# Patient Record
Sex: FEMALE | Employment: FULL TIME | URBAN - METROPOLITAN AREA
[De-identification: names, ages, dates, MRNs, and addresses within clinical notes are randomized per-mention and may not be internally consistent; named-entity substitution may affect disease eponyms.]

---

## 2017-06-19 ENCOUNTER — HOSPITAL ENCOUNTER (OUTPATIENT)
Dept: LAB | Age: 37
Discharge: HOME | End: 2017-06-19
Payer: OTHER GOVERNMENT

## 2017-06-19 ENCOUNTER — OFFICE VISIT (OUTPATIENT)
Dept: HEMATOLOGY | Age: 37
End: 2017-06-19

## 2017-06-19 VITALS
HEART RATE: 86 BPM | HEIGHT: 66 IN | BODY MASS INDEX: 24.01 KG/M2 | WEIGHT: 149.4 LBS | OXYGEN SATURATION: 99 % | DIASTOLIC BLOOD PRESSURE: 77 MMHG | SYSTOLIC BLOOD PRESSURE: 110 MMHG | TEMPERATURE: 98.7 F | RESPIRATION RATE: 18 BRPM

## 2017-06-19 DIAGNOSIS — Z98.84 H/O GASTRIC BYPASS: ICD-10-CM

## 2017-06-19 DIAGNOSIS — K55.069 SUPERIOR MESENTERIC VEIN THROMBOSIS (HCC): Primary | ICD-10-CM

## 2017-06-19 DIAGNOSIS — K55.069 SUPERIOR MESENTERIC VEIN THROMBOSIS (HCC): ICD-10-CM

## 2017-06-19 LAB
ALBUMIN SERPL BCP-MCNC: 4.1 G/DL (ref 3.4–5)
ALBUMIN/GLOB SERPL: 1.1 {RATIO} (ref 0.8–1.7)
ALP SERPL-CCNC: 64 U/L (ref 45–117)
ALT SERPL-CCNC: 26 U/L (ref 13–56)
ANION GAP BLD CALC-SCNC: 9 MMOL/L (ref 3–18)
AST SERPL W P-5'-P-CCNC: 30 U/L (ref 15–37)
BASOPHILS # BLD AUTO: 0.1 K/UL (ref 0–0.06)
BASOPHILS # BLD: 1 % (ref 0–2)
BILIRUB DIRECT SERPL-MCNC: 0.1 MG/DL (ref 0–0.2)
BILIRUB SERPL-MCNC: 0.2 MG/DL (ref 0.2–1)
BUN SERPL-MCNC: 8 MG/DL (ref 7–18)
BUN/CREAT SERPL: 9 (ref 12–20)
CALCIUM SERPL-MCNC: 9.1 MG/DL (ref 8.5–10.1)
CHLORIDE SERPL-SCNC: 105 MMOL/L (ref 100–108)
CO2 SERPL-SCNC: 25 MMOL/L (ref 21–32)
CREAT SERPL-MCNC: 0.85 MG/DL (ref 0.6–1.3)
DIFFERENTIAL METHOD BLD: ABNORMAL
EOSINOPHIL # BLD: 0.1 K/UL (ref 0–0.4)
EOSINOPHIL NFR BLD: 1 % (ref 0–5)
ERYTHROCYTE [DISTWIDTH] IN BLOOD BY AUTOMATED COUNT: 15.3 % (ref 11.6–14.5)
GLOBULIN SER CALC-MCNC: 3.7 G/DL (ref 2–4)
GLUCOSE SERPL-MCNC: 87 MG/DL (ref 74–99)
HCT VFR BLD AUTO: 33.5 % (ref 35–45)
HGB BLD-MCNC: 10.4 G/DL (ref 12–16)
LYMPHOCYTES # BLD AUTO: 33 % (ref 21–52)
LYMPHOCYTES # BLD: 2.8 K/UL (ref 0.9–3.6)
MCH RBC QN AUTO: 25.8 PG (ref 24–34)
MCHC RBC AUTO-ENTMCNC: 31 G/DL (ref 31–37)
MCV RBC AUTO: 83.1 FL (ref 74–97)
MONOCYTES # BLD: 0.7 K/UL (ref 0.05–1.2)
MONOCYTES NFR BLD AUTO: 8 % (ref 3–10)
NEUTS SEG # BLD: 4.9 K/UL (ref 1.8–8)
NEUTS SEG NFR BLD AUTO: 57 % (ref 40–73)
PLATELET # BLD AUTO: 479 K/UL (ref 135–420)
PMV BLD AUTO: 9.4 FL (ref 9.2–11.8)
POTASSIUM SERPL-SCNC: 4.5 MMOL/L (ref 3.5–5.5)
PROT SERPL-MCNC: 7.8 G/DL (ref 6.4–8.2)
RBC # BLD AUTO: 4.03 M/UL (ref 4.2–5.3)
SODIUM SERPL-SCNC: 139 MMOL/L (ref 136–145)
WBC # BLD AUTO: 8.6 K/UL (ref 4.6–13.2)

## 2017-06-19 PROCEDURE — 80076 HEPATIC FUNCTION PANEL: CPT | Performed by: INTERNAL MEDICINE

## 2017-06-19 PROCEDURE — 80048 BASIC METABOLIC PNL TOTAL CA: CPT | Performed by: INTERNAL MEDICINE

## 2017-06-19 PROCEDURE — 85025 COMPLETE CBC W/AUTO DIFF WBC: CPT | Performed by: INTERNAL MEDICINE

## 2017-06-19 PROCEDURE — 36415 COLL VENOUS BLD VENIPUNCTURE: CPT | Performed by: INTERNAL MEDICINE

## 2017-06-19 RX ORDER — TOPIRAMATE 100 MG/1
100 TABLET, FILM COATED ORAL 2 TIMES DAILY
COMMUNITY

## 2017-06-19 RX ORDER — DICYCLOMINE HYDROCHLORIDE 10 MG/1
20 CAPSULE ORAL 3 TIMES DAILY
COMMUNITY
End: 2019-04-28

## 2017-06-19 RX ORDER — MIDODRINE HYDROCHLORIDE 5 MG/1
10 TABLET ORAL 3 TIMES DAILY
COMMUNITY

## 2017-06-19 NOTE — PROGRESS NOTES
2 Blair[address] Ortegasandro Andrade MD, ELI Pal PA-C Benuel Port, NP        at [address], [address] [facility], [address]     [phone]     FAX: [phone]    at [address], [address] [place] [address], [address]     [phone]     FAX: [phone]         Patient Care Team:  None as PCP - Abimael Ibrahim MD (Internal Medicine)      Problem List  Date Reviewed: 7/15/2017          Codes Class Noted    H/O gastric bypass ICD-10-CM: Z98.890  ICD-9-CM: V45.86  7/15/2017    Overview Signed 7/15/2017  8:12 PM by Fadumo Andrade MD     2007             Chronic abdominal pain ICD-10-CM: R10.9, G89.29  ICD-9-CM: 789.00, 338.29  7/15/2017        Migraine headache ICD-10-CM: A71.835  ICD-9-CM: 346.90  7/15/2017        Neurocardiogenic syncope ICD-10-CM: R55  ICD-9-CM: 780.2  7/15/2017        Superior mesenteric vein thrombosis ([address] ICD-10-CM: O35  ICD-9-CM: 557.0  7/32/6434        H/O umbilical hernia repair FES-[id number]: L05.359, Z87.19  ICD-9-CM: V15.29  7/15/2017                The physicians listed above have asked me to see Susie Guajardo in consultation regarding elevated liver enzymes and chronic abdominal pain. All medical records sent by the referring physicians were reviewed including imaging studies     The patient is a 40 y.o.  female who developed abdominal pain in about 3/2016. She underwent a gastric bypass in 2008. She lost about 100 pounds after the procedure. EGD was performed in 6/2016. This demonstrated no ulcer in the gastric pouch. A CT scan of the abdomen demonstrated an umbilical hernia. This was surgically repaired but did not help her pain. An assessment of liver fibrosis with biopsy or elastography has not been performed.       The most recent laboratory studies indicate that the liver transaminases are normal, ALP is normal, tests of hepatic synthetic and metabolic function are normal, and the platelet count is normal.    The patient notes chronic diffuse abdominal pain, She describes the pain as constant. She sleeps sitting up on the  because lying in the bed is associated with worsening abdominal pain. Eating food makes the pain worse. Drinking gatorade is ok. The patient has severe limitations in functional activities secondary to these symptoms. The patient has not experienced fevers, chills, nausea, vomiting,       ALLERGIES  No Known Allergies    MEDICATIONS  Current Outpatient Prescriptions   Medication Sig    topiramate (TOPAMAX) 100 mg tablet Take  by mouth two (2) times a day.  rivaroxaban (XARELTO) 20 mg tab tablet Take  by mouth daily.  dicyclomine (BENTYL) 10 mg capsule Take 10 mg by mouth 4 times daily (before meals and nightly).  SUMATRIPTAN SUCCINATE (IMITREX SC) by SubCUTAneous route.  midodrine (PROAMITINE) 5 mg tablet Take  by mouth three (3) times daily. No current facility-administered medications for this visit. SYSTEM REVIEW NOT RELATED TO LIVER DISEASE OR REVIEWED ABOVE:  Constitution systems: Negative for fever, chills, weight gain, weight loss. Eyes: Negative for visual changes. ENT: Negative for sore throat, painful swallowing. Respiratory: Negative for cough, hemoptysis, SOB. Cardiology: Negative for chest pain, palpitations. GI:  Negative for constipation or diarrhea. : Negative for urinary frequency, dysuria, hematuria, nocturia. Skin: Negative for rash. Hematology: Negative for easy bruising, blood clots. Musculo-skelatal: Negative for back pain, muscle pain, weakness. Neurologic: Negative for headaches, dizziness, vertigo, memory problems not related to HE. Psychology: Negative for anxiety, depression. FAMILY HISTORY:  The father  of DM and MI. The mother  of heart disease and a CVA.     There is no family history of liver disease. SOCIAL HISTORY:  The patient is . The patient has 2 children,   The patient has never used tobacco products. The patient has never consumed significant amounts of alcohol. The patient has not work. The patient is currently receiving disability. PHYSICAL EXAMINATION:  Visit Vitals    /77 (BP 1 Location: Left arm, BP Patient Position: Sitting)    Pulse 86    Temp 98.7 °F (37.1 °C) (Tympanic)    Resp 18    Ht 5' 6\" (1.676 m)    Wt 149 lb 6.4 oz (67.8 kg)    LMP 06/17/2017    SpO2 99%    BMI 24.11 kg/m2     General: No acute distress. Eyes: Sclera anicteric. ENT: No oral lesions. Thyroid normal.  Nodes: No adenopathy. Skin: No spider angiomata. No jaundice. No palmar erythema. Respiratory: Lungs clear to auscultation. Cardiovascular: Regular heart rate. No murmurs. No JVD. Abdomen: Soft non-tender. Liver size normal to percussion/palpation. Spleen not palpable. No obvious ascites. Extremities: No edema. No muscle wasting. No gross arthritic changes. Neurologic: Alert and oriented. Cranial nerves grossly intact. No asterixis. LABORATORY STUDIES:  Liver [facility] Ref Rng & Units 6/19/2017   WBC 4.6 - 13.2 K/uL 8.6   ANC 1.8 - 8.0 K/UL 4.9   HGB 12.0 - 16.0 g/dL 10.4 (L)    - 420 K/uL 479 (H)   AST 15 - 37 U/L 30   ALT 13 - 56 U/L 26   Alk Phos 45 - 117 U/L 64   Bili, Total 0.2 - 1.0 MG/DL 0.2   Bili, Direct 0.0 - 0.2 MG/DL 0.1   Albumin 3.4 - 5.0 g/dL 4.1   BUN 7.0 - 18 MG/DL 8   Creat 0.6 - 1.3 MG/DL 0.85   Na 136 - 145 mmol/L 139   K 3.5 - 5.5 mmol/L 4.5   Cl 100 - 108 mmol/L 105   CO2 21 - 32 mmol/L 25   Glucose 74 - 99 mg/dL 87     SEROLOGIES:  Not available or performed. Testing was performed today. LIVER HISTOLOGY:  Not available or performed    ENDOSCOPIC PROCEDURES:  Not available or performed    RADIOLOGY:  6/2017. MRI of liver. Normal appearing liver. No liver mass lesions.   Normal spleen. No dilated bile ducts. No bile duct strictures. No ascites. No evidence for SMV thrombosis. Collaterial between branch of SMV and splenic vein. OTHER TESTING:  Not available or performed    ASSESSMENT AND PLAN:  Persistent abdominal pain of unclear etiology. Will perform laboratory testing to monitor liver function and degree of liver injury. This included BMP, hepatic panel, CBC with platelet count, INR. The lvier enzymes are normal.  Liver function is normal.  The platelet count is normal.      Will perform imaging of the liver with MRI. Based upon laboratory studies and imaging the patient does not appear to have liver disease. Serologic testing to help define the cause of the laboratory abnormality were not ordered. Will perform serologic tests to screen for HBV and HCV. The patient reports severe chronic abdominal pain which does not subside. SMV thrombosis can cause abdominal pain but the MRI demonstrated this was patent. 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 There is no reason to perform liver biopsy at this time. The patient was directed to continue all current medications at the current dosages. There are no contraindications for the patient to take any medications that are necessary for treatment of other medical issues. The patient was counseled regarding alcohol consumption. The need for vaccination against viral hepatitis A and B will be assessed with serologic and instituted as appropriate. All of the above issues were discussed with the patient. All questions were answered. The patient expressed a clear understanding of the above. [address] [address] in 4 weeks to review all data and determine the treatment plan.     Corin Gambino MD  Liver [place] of [address] [place] [address] [id number] [phone]

## 2017-06-19 NOTE — MR AVS SNAPSHOT
Visit Information Date & Time Provider Department Dept. Phone Encounter #  
 6/19/2017  4:00 PM Roxanne Carter MD [facility] of [address] [place] [address] Street [id number] Follow-up Instructions Return in about 6 weeks (around 7/31/2017) for MLS. Upcoming Health Maintenance Date Due DTaP/Tdap/Td series (1 - Tdap) 5/24/2002 PAP AKA CERVICAL CYTOLOGY 5/24/2002 INFLUENZA AGE 9 TO ADULT 8/1/2017 Allergies as of 6/19/2017  Review Complete On: 6/19/2017 By: Homa Londono No Known Allergies Current Immunizations  Never Reviewed No immunizations on file. Not reviewed this visit You Were Diagnosed With   
  
 Codes Comments Superior mesenteric vein thrombosis ([facility] [address]    -  Primary ICD-10-CM: U86 
ICD-9-CM: 906.3 Vitals BP Pulse Temp Resp Height(growth percentile) Weight(growth percentile) 110/77 (BP 1 Location: Left arm, BP Patient Position: Sitting) 86 98.7 °F (37.1 °C) (Tympanic) 18 5' 6\" (1.676 m) 149 lb 6.4 oz (67.8 kg) LMP SpO2 BMI OB Status Smoking Status 06/17/2017 99% 24.11 kg/m2 Having regular periods Never Smoker BMI and BSA Data Body Mass Index Body Surface Area  
 24.11 kg/m 2 1.78 m 2 Your Updated Medication List  
  
   
This list is accurate as of: 6/19/17  5:23 PM.  Always use your most recent med list.  
  
  
  
  
 BENTYL 10 mg capsule Generic drug:  dicyclomine Take 10 mg by mouth 4 times daily (before meals and nightly). IMITREX SC  
by SubCUTAneous route.  
  
 midodrine 5 mg tablet Commonly known as:  Lizarraga Platts Take  by mouth three (3) times daily. TOPAMAX 100 mg tablet Generic drug:  topiramate Take  by mouth two (2) times a day. XARELTO 20 mg Tab tablet Generic drug:  rivaroxaban Take  by mouth daily. Follow-up Instructions Return in about 6 weeks (around 7/31/2017) for MLS. To-Do List   
 06/19/2017 Lab:  CBC WITH AUTOMATED DIFF   
  
 06/19/2017 Lab:  HEPATIC FUNCTION PANEL   
  
 06/19/2017 Lab:  METABOLIC PANEL, BASIC   
  
 06/19/2017 Imaging:  MRA ABD W WO CONT Introducing [facility] & HEALTH SERVICES! Jenon Robert introduces [employer] patient portal. Now you can access parts of your medical record, email your doctor's office, and request medication refills online. 1. In your internet browser, go to https://[email or web address]. [employer] 2. Click on the First Time User? Click Here link in the Sign In box. You will see the New Member Sign Up page. 3. Enter your [employer] Access Code exactly as it appears below. You will not need to use this code after youve completed the sign-up process. If you do not sign up before the expiration date, you must request a new code. · [employer] Access Code: [email or web address] Expires: 9/17/2017  5:23 PM 
 
4. Enter the last four digits of your Social Security Number (xxxx) and Date of Birth (mm/dd/yyyy) as indicated and click Submit. You will be taken to the next sign-up page. 5. Create a [employer] ID. This will be your [employer] login ID and cannot be changed, so think of one that is secure and easy to remember. 6. Create a [employer] password. You can change your password at any time. 7. Enter your Password Reset Question and Answer. This can be used at a later time if you forget your password. 8. Enter your e-mail address. You will receive e-mail notification when new information is available in [address] 9. Click Sign Up. You can now view and download portions of your medical record. 10. Click the Download Summary menu link to download a portable copy of your medical information. If you have questions, please visit the Frequently Asked Questions section of the [employer] website. Remember, [employer] is NOT to be used for urgent needs. For medical emergencies, dial 911. Now available from your iPhone and Android! Please provide this summary of care documentation to your next provider. If you have any questions after today's visit, please call [phone].

## 2017-06-29 ENCOUNTER — HOSPITAL ENCOUNTER (OUTPATIENT)
Dept: MRI IMAGING | Age: 37
Discharge: HOME | End: 2017-06-29
Attending: INTERNAL MEDICINE
Payer: OTHER GOVERNMENT

## 2017-06-29 DIAGNOSIS — K55.069 SUPERIOR MESENTERIC VEIN THROMBOSIS (HCC): ICD-10-CM

## 2017-06-29 PROCEDURE — 77030021566 MRA ABD W WO CONT

## 2017-06-29 PROCEDURE — A9585 GADOBUTROL INJECTION: HCPCS | Performed by: INTERNAL MEDICINE

## 2017-06-29 PROCEDURE — 74185 MRA ABD W OR W/O CNTRST: CPT

## 2017-06-29 PROCEDURE — 74011250636 HC RX REV CODE- 250/636: Performed by: INTERNAL MEDICINE

## 2017-06-29 RX ADMIN — GADOBUTROL 10 ML: 604.72 INJECTION INTRAVENOUS at 09:20

## 2017-07-15 PROBLEM — G89.29 CHRONIC ABDOMINAL PAIN: Status: ACTIVE | Noted: 2017-07-15

## 2017-07-15 PROBLEM — R10.9 CHRONIC ABDOMINAL PAIN: Status: ACTIVE | Noted: 2017-07-15

## 2017-07-15 PROBLEM — R55 NEUROCARDIOGENIC SYNCOPE: Status: ACTIVE | Noted: 2017-07-15

## 2017-07-15 PROBLEM — G43.909 MIGRAINE HEADACHE: Status: ACTIVE | Noted: 2017-07-15

## 2017-07-15 PROBLEM — Z98.890 H/O UMBILICAL HERNIA REPAIR: Status: ACTIVE | Noted: 2017-07-15

## 2017-07-15 PROBLEM — Z98.84 H/O GASTRIC BYPASS: Status: ACTIVE | Noted: 2017-07-15

## 2017-07-15 PROBLEM — Z87.19 H/O UMBILICAL HERNIA REPAIR: Status: ACTIVE | Noted: 2017-07-15

## 2017-07-15 PROBLEM — K55.069 SUPERIOR MESENTERIC VEIN THROMBOSIS (HCC): Status: ACTIVE | Noted: 2017-07-15

## 2017-07-26 ENCOUNTER — OFFICE VISIT (OUTPATIENT)
Dept: HEMATOLOGY | Age: 37
End: 2017-07-26

## 2017-07-26 VITALS
OXYGEN SATURATION: 100 % | HEIGHT: 66 IN | BODY MASS INDEX: 23.46 KG/M2 | DIASTOLIC BLOOD PRESSURE: 70 MMHG | WEIGHT: 146 LBS | TEMPERATURE: 99.3 F | HEART RATE: 71 BPM | RESPIRATION RATE: 16 BRPM | SYSTOLIC BLOOD PRESSURE: 114 MMHG

## 2017-07-26 DIAGNOSIS — R10.9 ABDOMINAL PAIN, UNSPECIFIED LOCATION: Primary | ICD-10-CM

## 2017-07-26 NOTE — PROGRESS NOTES
[address] Taryn Gonzales MD, [address], [place], [place]       ELI Tang PA-C Christina Bouche, NP Chilton Grinder, NP        at [address], [address] [address]     [phone]     FAX: [phone]    at [address], [address] [facility], [address]     [phone]     FAX: [phone]       Patient Care Team:  None as PCP - Hitesh Trimble MD (Internal Medicine)      Problem List  Date Reviewed: 7/15/2017          Codes Class Noted    H/O gastric bypass ICD-10-CM: Z98.890  ICD-9-CM: V45.86  7/15/2017    Overview Signed 7/15/2017  8:12 PM by Olivia Bearden MD     2008             Chronic abdominal pain ICD-10-CM: R10.9, G89.29  ICD-9-CM: 789.00, 338.29  7/15/2017        Migraine headache ICD-10-CM: D28.280  ICD-9-CM: 346.90  7/15/2017        Neurocardiogenic syncope ICD-10-CM: R55  ICD-9-CM: 780.2  7/15/2017        Superior mesenteric vein thrombosis ICD-10-CM: E01  ICD-9-CM: 557.0  9/92/9597        H/O umbilical hernia repair [id number]: N46.559, Z87.19  ICD-9-CM: V15.29  7/15/2017                The physicians listed above have asked me to see Lizette Naylor in consultation regarding elevated liver enzymes and chronic abdominal pain. All medical records sent by the referring physicians were reviewed including imaging studies     The patient is a 40 y.o.  female who developed abdominal pain in about 3/2016. She underwent a gastric bypass in 2008. She lost about 100 pounds after the procedure. EGD was performed in 6/2016. This demonstrated no ulcer in the gastric pouch. An MRI of the abdomen demonstrated a collateral shunt between the SMV and the Splenic vein. NO evidence of PV thrombosis was seen. An assessment of liver fibrosis with biopsy or elastography has not been performed.       The most recent laboratory studies indicate that the liver transaminases are normal, ALP is normal, tests of hepatic synthetic and metabolic function are normal, and the platelet count is normal.    The patient notes chronic diffuse abdominal pain, She describes the pain as constant. She sleeps sitting up on the  because lying in the bed is associated with worsening abdominal pain. Eating food makes the pain worse. Drinking gatorade is ok. The patient has severe limitations in functional activities secondary to abdominal pain. The patient has not experienced fevers, chills, nausea, vomiting,       ALLERGIES  No Known Allergies    MEDICATIONS  Current Outpatient Prescriptions   Medication Sig    topiramate (TOPAMAX) 100 mg tablet Take  by mouth two (2) times a day.  rivaroxaban (XARELTO) 20 mg tab tablet Take  by mouth daily.  dicyclomine (BENTYL) 10 mg capsule Take 10 mg by mouth 4 times daily (before meals and nightly).  SUMATRIPTAN SUCCINATE (IMITREX SC) by SubCUTAneous route.  midodrine (PROAMITINE) 5 mg tablet Take  by mouth three (3) times daily. No current facility-administered medications for this visit. SYSTEM REVIEW NOT RELATED TO LIVER DISEASE OR REVIEWED ABOVE:  Constitution systems: Negative for fever, chills, weight gain, weight loss. Eyes: Negative for visual changes. ENT: Negative for sore throat, painful swallowing. Respiratory: Negative for cough, hemoptysis, SOB. Cardiology: Negative for chest pain, palpitations. GI:  Negative for constipation or diarrhea. : Negative for urinary frequency, dysuria, hematuria, nocturia. Skin: Negative for rash. Hematology: Negative for easy bruising, blood clots. Musculo-skelatal: Negative for back pain, muscle pain, weakness. Neurologic: Negative for headaches, dizziness, vertigo, memory problems not related to HE. Psychology: Negative for anxiety, depression. FAMILY HISTORY:  The father  of DM and MI.     The mother  of heart disease and a CVA. There is no family history of liver disease. SOCIAL HISTORY:  The patient is . The patient has 2 children,   The patient has never used tobacco products. The patient has never consumed significant amounts of alcohol. The patient has not work. The patient is currently receiving disability. PHYSICAL EXAMINATION:  Visit Vitals    /70 (BP 1 Location: Left arm, BP Patient Position: Sitting)    Pulse 71    Temp 99.3 °F (37.4 °C) (Tympanic)    Resp 16    Ht 5' 6\" (1.676 m)    Wt 146 lb (66.2 kg)    LMP 07/26/2017    SpO2 100%    BMI 23.57 kg/m2     General: No acute distress. Eyes: Sclera anicteric. ENT: No oral lesions. Thyroid normal.  Nodes: No adenopathy. Skin: No spider angiomata. No jaundice. No palmar erythema. Respiratory: Lungs clear to auscultation. Cardiovascular: Regular heart rate. No murmurs. No JVD. Abdomen: Soft non-tender. Liver size normal to percussion/palpation. Spleen not palpable. No obvious ascites. Extremities: No edema. No muscle wasting. No gross arthritic changes. Neurologic: Alert and oriented. Cranial nerves grossly intact. No asterixis. LABORATORY STUDIES:  Liver [facility] Ref Rng & Units 6/19/2017   WBC 4.6 - 13.2 K/uL 8.6   ANC 1.8 - 8.0 K/UL 4.9   HGB 12.0 - 16.0 g/dL 10.4 (L)    - 420 K/uL 479 (H)   AST 15 - 37 U/L 30   ALT 13 - 56 U/L 26   Alk Phos 45 - 117 U/L 64   Bili, Total 0.2 - 1.0 MG/DL 0.2   Bili, Direct 0.0 - 0.2 MG/DL 0.1   Albumin 3.4 - 5.0 g/dL 4.1   BUN 7.0 - 18 MG/DL 8   Creat 0.6 - 1.3 MG/DL 0.85   Na 136 - 145 mmol/L 139   K 3.5 - 5.5 mmol/L 4.5   Cl 100 - 108 mmol/L 105   CO2 21 - 32 mmol/L 25   Glucose 74 - 99 mg/dL 87     SEROLOGIES:  Not available or performed. Testing was performed today. LIVER HISTOLOGY:  Not available or performed    ENDOSCOPIC PROCEDURES:  Not available or performed    RADIOLOGY:  6/2017. MRI of liver.   Normal appearing liver.  No liver mass lesions. Normal spleen. No dilated bile ducts. No bile duct strictures. No ascites. No evidence for SMV thrombosis. Collaterial between branch of SMV and splenic vein. OTHER TESTING:  Not available or performed    ASSESSMENT AND PLAN:  Persistent abdominal pain of unclear etiology. She has had a gastric bypass in the past and so the pain could be from adhesions but seems more constant and severe than is typically seen with adhesions. A collateral vessel was observed between the SMV and splenic veins. Do not know why this formed. Possibly secondary to portal veins thrombosis which has since resolved. I wonder if this collateral shunt is somehow causing pain and if this were embolized maybe the pain would subside. Will review and discuss with Dr Benjamin Veliz. Will perform laboratory testing to monitor liver function and degree of liver injury. This included BMP, hepatic panel, CBC with platelet count, INR. The lvier enzymes are normal.  Liver function is normal.  The platelet count is normal.      Based upon laboratory studies and imaging the patient does not appear to have liver disease. The patient reports severe chronic abdominal pain which does not subside. There is no reason to perform liver biopsy at this time. The patient was directed to continue all current medications at the current dosages. There are no contraindications for the patient to take any medications that are necessary for treatment of other medical issues. The patient was counseled regarding alcohol consumption. The need for vaccination against viral hepatitis A and B will be assessed with serologic and instituted as appropriate. All of the above issues were discussed with the patient. All questions were answered. The patient expressed a clear understanding of the above.     No follow-up appointment at [place] [address] was made at this time,  I will contact the patient with a plan after I review and discuss this with Dr Jacquelyn Meyers. Mallory Myles MD  Liver [place] of [address] [facility] [address], [address]  [phone]        Talk to Jevon re-angio and embolizATION OF BYPASS TRRACT.

## 2017-07-26 NOTE — MR AVS SNAPSHOT
Visit Information Date & Time Provider Department Dept. Phone Encounter #  
 7/26/2017  3:15 PM Tigist Rosales MD [address] [id number] Upcoming Health Maintenance Date Due DTaP/Tdap/Td series (1 - Tdap) 5/24/2002 PAP AKA CERVICAL CYTOLOGY 5/24/2002 INFLUENZA AGE 9 TO ADULT 8/1/2017 Allergies as of 7/26/2017  Review Complete On: 7/26/2017 By: Kelsey Desai No Known Allergies Current Immunizations  Never Reviewed No immunizations on file. Not reviewed this visit Vitals BP Pulse Temp Resp Height(growth percentile) Weight(growth percentile) 114/70 (BP 1 Location: Left arm, BP Patient Position: Sitting) 71 99.3 °F (37.4 °C) (Tympanic) 16 5' 6\" (1.676 m) 146 lb (66.2 kg) LMP SpO2 BMI OB Status Smoking Status 07/26/2017 100% 23.57 kg/m2 Having regular periods Never Smoker BMI and BSA Data Body Mass Index Body Surface Area  
 23.57 kg/m 2 1.76 m 2 Your Updated Medication List  
  
   
This list is accurate as of: 7/26/17  4:16 PM.  Always use your most recent med list.  
  
  
  
  
 BENTYL 10 mg capsule Generic drug:  dicyclomine Take 10 mg by mouth 4 times daily (before meals and nightly). IMITREX SC  
by SubCUTAneous route.  
  
 midodrine 5 mg tablet Commonly known as:  Jacqualyn Spina Take  by mouth three (3) times daily. TOPAMAX 100 mg tablet Generic drug:  topiramate Take  by mouth two (2) times a day. XARELTO 20 mg Tab tablet Generic drug:  rivaroxaban Take  by mouth daily. Introducing [facility] & HEALTH SERVICES! [employer] introduces [employer] patient portal. Now you can access parts of your medical record, email your doctor's office, and request medication refills online. 1. In your internet browser, go to [email or web address] 2. Click on the First Time User? Click Here link in the Sign In box.  You will see the New Member Sign Up page. 3. Enter your [employer] Access Code exactly as it appears below. You will not need to use this code after youve completed the sign-up process. If you do not sign up before the expiration date, you must request a new code. · [employer] Access Code: [email or web address] Expires: 9/17/2017  5:23 PM 
 
4. Enter the last four digits of your Social Security Number (xxxx) and Date of Birth (mm/dd/yyyy) as indicated and click Submit. You will be taken to the next sign-up page. 5. Create a [employer] ID. This will be your [employer] login ID and cannot be changed, so think of one that is secure and easy to remember. 6. Create a [employer] password. You can change your password at any time. 7. Enter your Password Reset Question and Answer. This can be used at a later time if you forget your password. 8. Enter your e-mail address. You will receive e-mail notification when new information is available in [address] 9. Click Sign Up. You can now view and download portions of your medical record. 10. Click the Download Summary menu link to download a portable copy of your medical information. If you have questions, please visit the Frequently Asked Questions section of the [employer] website. Remember, [employer] is NOT to be used for urgent needs. For medical emergencies, dial 911. Now available from your iPhone and Android! Please provide this summary of care documentation to your next provider. Your primary care clinician is listed as NONE. If you have any questions after today's visit, please call [phone].

## 2017-08-03 ENCOUNTER — TELEPHONE (OUTPATIENT)
Dept: HEMATOLOGY | Age: 37
End: 2017-08-03

## 2017-08-03 NOTE — TELEPHONE ENCOUNTER
Pt. Called stating her last appt. with Dr. Oswald Tejeda on 7/26/17 they spoke about a procedure being done regarding stents. Pt wants Dr. Oswald Tejeda to give her a call to make sure everything is still a go, and if the procedure is safe! Please Advise . ...       Jeanne Al MA

## 2017-08-07 NOTE — TELEPHONE ENCOUNTER
Patient calling to follow up on previous phone call from 08/03/17. Please contact at earliest convenience.

## 2017-08-09 NOTE — TELEPHONE ENCOUNTER
Called pt back, spoke with her re an appt with Dr. Dante Sales. Informed pt of appt day and time, pt verbalized understanding.

## 2017-08-31 ENCOUNTER — HOSPITAL ENCOUNTER (EMERGENCY)
Age: 37
Discharge: HOME | End: 2017-09-01
Attending: FAMILY MEDICINE
Payer: OTHER GOVERNMENT

## 2017-08-31 ENCOUNTER — APPOINTMENT (OUTPATIENT)
Dept: GENERAL RADIOLOGY | Age: 37
End: 2017-08-31
Attending: PHYSICIAN ASSISTANT
Payer: OTHER GOVERNMENT

## 2017-08-31 VITALS
HEIGHT: 66 IN | TEMPERATURE: 97.4 F | WEIGHT: 145 LBS | DIASTOLIC BLOOD PRESSURE: 70 MMHG | RESPIRATION RATE: 18 BRPM | SYSTOLIC BLOOD PRESSURE: 121 MMHG | BODY MASS INDEX: 23.3 KG/M2 | HEART RATE: 86 BPM | OXYGEN SATURATION: 100 %

## 2017-08-31 DIAGNOSIS — K59.00 CONSTIPATION, UNSPECIFIED CONSTIPATION TYPE: Primary | ICD-10-CM

## 2017-08-31 DIAGNOSIS — K52.9 ENTERITIS: ICD-10-CM

## 2017-08-31 LAB
ALBUMIN SERPL-MCNC: 4 G/DL (ref 3.4–5)
ALBUMIN/GLOB SERPL: 1 {RATIO} (ref 0.8–1.7)
ALP SERPL-CCNC: 55 U/L (ref 45–117)
ALT SERPL-CCNC: 24 U/L (ref 13–56)
ANION GAP SERPL CALC-SCNC: 10 MMOL/L (ref 3–18)
AST SERPL-CCNC: 24 U/L (ref 15–37)
BASOPHILS # BLD: 0.1 K/UL (ref 0–0.06)
BASOPHILS NFR BLD: 1 % (ref 0–2)
BILIRUB SERPL-MCNC: 0.2 MG/DL (ref 0.2–1)
BUN SERPL-MCNC: 10 MG/DL (ref 7–18)
BUN/CREAT SERPL: 11 (ref 12–20)
CALCIUM SERPL-MCNC: 9 MG/DL (ref 8.5–10.1)
CHLORIDE SERPL-SCNC: 107 MMOL/L (ref 100–108)
CO2 SERPL-SCNC: 24 MMOL/L (ref 21–32)
CREAT SERPL-MCNC: 0.95 MG/DL (ref 0.6–1.3)
DIFFERENTIAL METHOD BLD: ABNORMAL
EOSINOPHIL # BLD: 0.2 K/UL (ref 0–0.4)
EOSINOPHIL NFR BLD: 2 % (ref 0–5)
ERYTHROCYTE [DISTWIDTH] IN BLOOD BY AUTOMATED COUNT: 16.3 % (ref 11.6–14.5)
GLOBULIN SER CALC-MCNC: 4 G/DL (ref 2–4)
GLUCOSE SERPL-MCNC: 85 MG/DL (ref 74–99)
HCG UR QL: NEGATIVE
HCT VFR BLD AUTO: 32.2 % (ref 35–45)
HGB BLD-MCNC: 10 G/DL (ref 12–16)
LACTATE SERPL-SCNC: 0.5 MMOL/L (ref 0.4–2)
LIPASE SERPL-CCNC: 176 U/L (ref 73–393)
LYMPHOCYTES # BLD: 2.5 K/UL (ref 0.9–3.6)
LYMPHOCYTES NFR BLD: 33 % (ref 21–52)
MCH RBC QN AUTO: 25.3 PG (ref 24–34)
MCHC RBC AUTO-ENTMCNC: 31.1 G/DL (ref 31–37)
MCV RBC AUTO: 81.3 FL (ref 74–97)
MONOCYTES # BLD: 0.6 K/UL (ref 0.05–1.2)
MONOCYTES NFR BLD: 8 % (ref 3–10)
NEUTS SEG # BLD: 4.2 K/UL (ref 1.8–8)
NEUTS SEG NFR BLD: 56 % (ref 40–73)
PLATELET # BLD AUTO: 450 K/UL (ref 135–420)
PMV BLD AUTO: 9.2 FL (ref 9.2–11.8)
POTASSIUM SERPL-SCNC: 4.4 MMOL/L (ref 3.5–5.5)
PROT SERPL-MCNC: 8 G/DL (ref 6.4–8.2)
RBC # BLD AUTO: 3.96 M/UL (ref 4.2–5.3)
SODIUM SERPL-SCNC: 141 MMOL/L (ref 136–145)
WBC # BLD AUTO: 7.6 K/UL (ref 4.6–13.2)

## 2017-08-31 PROCEDURE — 80053 COMPREHEN METABOLIC PANEL: CPT | Performed by: PHYSICIAN ASSISTANT

## 2017-08-31 PROCEDURE — 81025 URINE PREGNANCY TEST: CPT

## 2017-08-31 PROCEDURE — 83605 ASSAY OF LACTIC ACID: CPT | Performed by: PHYSICIAN ASSISTANT

## 2017-08-31 PROCEDURE — 96374 THER/PROPH/DIAG INJ IV PUSH: CPT

## 2017-08-31 PROCEDURE — 96375 TX/PRO/DX INJ NEW DRUG ADDON: CPT

## 2017-08-31 PROCEDURE — 74011250637 HC RX REV CODE- 250/637: Performed by: PHYSICIAN ASSISTANT

## 2017-08-31 PROCEDURE — 99284 EMERGENCY DEPT VISIT MOD MDM: CPT

## 2017-08-31 PROCEDURE — 74011250636 HC RX REV CODE- 250/636: Performed by: PHYSICIAN ASSISTANT

## 2017-08-31 PROCEDURE — 83690 ASSAY OF LIPASE: CPT | Performed by: PHYSICIAN ASSISTANT

## 2017-08-31 PROCEDURE — 96361 HYDRATE IV INFUSION ADD-ON: CPT

## 2017-08-31 PROCEDURE — 81003 URINALYSIS AUTO W/O SCOPE: CPT | Performed by: FAMILY MEDICINE

## 2017-08-31 PROCEDURE — 85025 COMPLETE CBC W/AUTO DIFF WBC: CPT | Performed by: PHYSICIAN ASSISTANT

## 2017-08-31 PROCEDURE — 74022 RADEX COMPL AQT ABD SERIES: CPT

## 2017-08-31 PROCEDURE — 96376 TX/PRO/DX INJ SAME DRUG ADON: CPT

## 2017-08-31 RX ORDER — DICYCLOMINE HYDROCHLORIDE 10 MG/1
10 CAPSULE ORAL
Status: COMPLETED | OUTPATIENT
Start: 2017-08-31 | End: 2017-08-31

## 2017-08-31 RX ORDER — MORPHINE SULFATE 4 MG/ML
4 INJECTION, SOLUTION INTRAMUSCULAR; INTRAVENOUS
Status: COMPLETED | OUTPATIENT
Start: 2017-08-31 | End: 2017-08-31

## 2017-08-31 RX ORDER — ONDANSETRON 2 MG/ML
4 INJECTION INTRAMUSCULAR; INTRAVENOUS
Status: COMPLETED | OUTPATIENT
Start: 2017-08-31 | End: 2017-08-31

## 2017-08-31 RX ADMIN — Medication 4 MG: at 21:29

## 2017-08-31 RX ADMIN — SODIUM CHLORIDE 1000 ML: 900 INJECTION, SOLUTION INTRAVENOUS at 21:29

## 2017-08-31 RX ADMIN — ONDANSETRON 4 MG: 2 INJECTION INTRAMUSCULAR; INTRAVENOUS at 21:39

## 2017-08-31 RX ADMIN — DICYCLOMINE HYDROCHLORIDE 10 MG: 10 CAPSULE ORAL at 23:14

## 2017-09-01 ENCOUNTER — APPOINTMENT (OUTPATIENT)
Dept: CT IMAGING | Age: 37
End: 2017-09-01
Attending: PHYSICIAN ASSISTANT
Payer: OTHER GOVERNMENT

## 2017-09-01 LAB
APPEARANCE UR: CLEAR
BILIRUB UR QL: NEGATIVE
COLOR UR: YELLOW
GLUCOSE UR STRIP.AUTO-MCNC: NEGATIVE MG/DL
HGB UR QL STRIP: NEGATIVE
KETONES UR QL STRIP.AUTO: NEGATIVE MG/DL
LEUKOCYTE ESTERASE UR QL STRIP.AUTO: NEGATIVE
NITRITE UR QL STRIP.AUTO: NEGATIVE
PH UR STRIP: 7 [PH] (ref 5–8)
PROT UR STRIP-MCNC: NEGATIVE MG/DL
SP GR UR REFRACTOMETRY: 1.02 (ref 1–1.03)
UROBILINOGEN UR QL STRIP.AUTO: 1 EU/DL (ref 0.2–1)

## 2017-09-01 PROCEDURE — 74177 CT ABD & PELVIS W/CONTRAST: CPT

## 2017-09-01 PROCEDURE — 74011636320 HC RX REV CODE- 636/320: Performed by: EMERGENCY MEDICINE

## 2017-09-01 PROCEDURE — 74011250636 HC RX REV CODE- 250/636: Performed by: PHYSICIAN ASSISTANT

## 2017-09-01 RX ORDER — ONDANSETRON 2 MG/ML
4 INJECTION INTRAMUSCULAR; INTRAVENOUS
Status: COMPLETED | OUTPATIENT
Start: 2017-09-01 | End: 2017-09-01

## 2017-09-01 RX ORDER — ONDANSETRON 4 MG/1
4 TABLET, ORALLY DISINTEGRATING ORAL
Qty: 12 TAB | Refills: 0 | Status: SHIPPED | OUTPATIENT
Start: 2017-09-01 | End: 2018-03-01

## 2017-09-01 RX ORDER — HYDROMORPHONE HYDROCHLORIDE 1 MG/ML
0.5 INJECTION, SOLUTION INTRAMUSCULAR; INTRAVENOUS; SUBCUTANEOUS
Status: COMPLETED | OUTPATIENT
Start: 2017-09-01 | End: 2017-09-01

## 2017-09-01 RX ADMIN — ONDANSETRON 4 MG: 2 INJECTION INTRAMUSCULAR; INTRAVENOUS at 00:22

## 2017-09-01 RX ADMIN — IOPAMIDOL 100 ML: 612 INJECTION, SOLUTION INTRAVENOUS at 00:46

## 2017-09-01 RX ADMIN — HYDROMORPHONE HYDROCHLORIDE 0.5 MG: 1 INJECTION, SOLUTION INTRAMUSCULAR; INTRAVENOUS; SUBCUTANEOUS at 00:23

## 2017-09-01 NOTE — ED TRIAGE NOTES
Pt reports to ED c/o chronic \"severe abdominal pain\" onset \" a long time. \" PT reports currently be treated by Dr. Maria G Aguilar.

## 2017-09-01 NOTE — ED PROVIDER NOTES
[address]  EMERGENCY DEPARTMENT HISTORY AND PHYSICAL EXAM       Date: 8/31/2017   Patient Name: Chiquita Fox   YOB: 1980  Medical Record Number: [id number]    History of Presenting Illness     No chief complaint on file. History Provided By:  patient    Additional History: 8:36 PM  Chiquita Fox is a 40 y.o. 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 Pt states that the pain is sharp, intermittent, right sided, and has a different character than her chronic left-sided pain. Associated symptoms include sleep disturbance, nausea. Hx of cholecystectomy, hernia repair x3, and cystectomy. She is followed by Dr. Samuel Altamirano, had an MRI 2 months ago which showed patent SMV with collateral vessels. She followed up with a surgeon in [place], who said that they might do surgery because her chronic pain could be due to decreased blood flow in that area. Patient denies back pain, vomiting, blood in the stool, constipation, hematuria, dysuria, and any other symptoms or complaints. Primary Care Provider: None   Specialist:    Past History     Past Medical History:   Past Medical History:   Diagnosis Date    Abdominal pain     Hypotension         Past Surgical History:   Past Surgical History:   Procedure Laterality Date    BREAST SURGERY PROCEDURE UNLISTED      breast reduction    HX BREAST BIOPSY      HX CHOLECYSTECTOMY      HX CYSTECTOMY      HX GASTRIC BYPASS      HX GI      HX GYN      HX ORTHOPAEDIC      left knee        Family History:   History reviewed. No pertinent family history.      Social History:   Social History   Substance Use Topics    Smoking status: Never Smoker    Smokeless tobacco: Never Used    Alcohol use Yes        Allergies:   No Known Allergies Review of Systems   Review of Systems   Gastrointestinal: Positive for abdominal pain and nausea. Negative for blood in stool, constipation and vomiting. Genitourinary: Negative for dysuria and hematuria. Musculoskeletal: Negative for back pain. Psychiatric/Behavioral: Positive for sleep disturbance. All other systems reviewed and are negative. Physical Exam  Vitals:    08/31/17 2007 08/31/17 2315   BP: 119/85 121/70   Pulse: 83 86   Resp: 16 18   Temp: 97.4 °F (36.3 °C)    SpO2: 100% 100%   Weight: 65.8 kg (145 lb)    Height: 5' 6\" (1.676 m)        Physical Exam  Vital signs and nursing notes reviewed. CONSTITUTIONAL: Alert. Well-appearing; well-nourished; in moderate pain distress. HEAD: Normocephalic; atraumatic. EYES: PERRL; Conjunctiva clear. ENT:  Moist mucus membranes. NECK: Supple; FROM without difficulty, non-tender; no cervical lymphadenopathy. CV: Normal S1, S2; no murmurs, rubs, or gallops. No chest wall tenderness. RESPIRATORY: Normal chest excursion with respiration; breath sounds clear and equal bilaterally; no wheezes, rhonchi, or rales. GI: Normal bowel sounds; non-distended; +TTP right lateral abdomen, no pain at McBurney's point; Mild left-sided abd tenderness (chronic, at baseline per pt); no guarding or rigidity; no palpable organomegaly. No CVA tenderness. BACK:  No evidence of trauma or deformity. Non-tender to palpation. EXT: Normal ROM in all four extremities; non-tender to palpation. No edema. SKIN: Normal for age and race; warm; dry; good turgor; no apparent lesions or exudate. NEURO: A & O x3. PSYCH:  Mood and affect appropriate.        Diagnostic Study Results     Labs -      Recent Results (from the past 12 hour(s))   CBC WITH AUTOMATED DIFF    Collection Time: 08/31/17  8:53 PM   Result Value Ref Range    WBC 7.6 4.6 - 13.2 K/uL    RBC 3.96 (L) 4.20 - 5.30 M/uL    HGB 10.0 (L) 12.0 - 16.0 g/dL    HCT 32.2 (L) 35.0 - 45.0 %    MCV 81.3 74.0 - 97.0 FL    MCH 25.3 24.0 - 34.0 PG    MCHC 31.1 31.0 - 37.0 g/dL    RDW 16.3 (H) 11.6 - 14.5 %    PLATELET 749 (H) 295 - 420 K/uL    MPV 9.2 9.2 - 11.8 FL    NEUTROPHILS 56 40 - 73 %    LYMPHOCYTES 33 21 - 52 %    MONOCYTES 8 3 - 10 %    EOSINOPHILS 2 0 - 5 %    BASOPHILS 1 0 - 2 %    ABS. NEUTROPHILS 4.2 1.8 - 8.0 K/UL    ABS. LYMPHOCYTES 2.5 0.9 - 3.6 K/UL    ABS. MONOCYTES 0.6 0.05 - 1.2 K/UL    ABS. EOSINOPHILS 0.2 0.0 - 0.4 K/UL    ABS. BASOPHILS 0.1 (H) 0.0 - 0.06 K/UL    DF AUTOMATED     LIPASE    Collection Time: 08/31/17  8:53 PM   Result Value Ref Range    Lipase 176 73 - 393 U/L   LACTIC ACID    Collection Time: 08/31/17  8:53 PM   Result Value Ref Range    Lactic acid 0.5 0.4 - 2.0 MMOL/L   METABOLIC PANEL, COMPREHENSIVE    Collection Time: 08/31/17  8:53 PM   Result Value Ref Range    Sodium 141 136 - 145 mmol/L    Potassium 4.4 3.5 - 5.5 mmol/L    Chloride 107 100 - 108 mmol/L    CO2 24 21 - 32 mmol/L    Anion gap 10 3.0 - 18 mmol/L    Glucose 85 74 - 99 mg/dL    BUN 10 7.0 - 18 MG/DL    Creatinine 0.95 0.6 - 1.3 MG/DL    BUN/Creatinine ratio 11 (L) 12 - 20      GFR est AA >60 >60 ml/min/1.73m2    GFR est non-AA >60 >60 ml/min/1.73m2    Calcium 9.0 8.5 - 10.1 MG/DL    Bilirubin, total 0.2 0.2 - 1.0 MG/DL    ALT (SGPT) 24 13 - 56 U/L    AST (SGOT) 24 15 - 37 U/L    Alk.  phosphatase 55 45 - 117 U/L    Protein, total 8.0 6.4 - 8.2 g/dL    Albumin 4.0 3.4 - 5.0 g/dL    Globulin 4.0 2.0 - 4.0 g/dL    A-G Ratio 1.0 0.8 - 1.7     URINALYSIS W/ RFLX MICROSCOPIC    Collection Time: 08/31/17  9:09 PM   Result Value Ref Range    Color YELLOW      Appearance CLEAR      Specific gravity 1.019 1.005 - 1.030      pH (UA) 7.0 5.0 - 8.0      Protein NEGATIVE  NEG mg/dL    Glucose NEGATIVE  NEG mg/dL    Ketone NEGATIVE  NEG mg/dL    Bilirubin NEGATIVE  NEG      Blood NEGATIVE  NEG      Urobilinogen 1.0 0.2 - 1.0 EU/dL    Nitrites NEGATIVE  NEG      Leukocyte Esterase NEGATIVE  NEG     HCG URINE, QL. - POC Collection Time: 08/31/17  9:15 PM   Result Value Ref Range    Pregnancy test,urine (POC) NEGATIVE  NEG         Radiologic Studies -  The following have been ordered and reviewed:  CT ABD PELV W CONT   Final Result   IMPRESSION:     Retained stool throughout the colon.     Prominent fluid-filled small bowel loops is a nonspecific finding but may be  seen with an enteritis.      Small amount of free fluid within the pelvis.     The appendix is not definitively identified. XR ABD ACUTE W 1 V CHEST    (Results Pending)     RADIOLOGY FINDINGS  Abdominal X-ray shows increased stool, no obstruction or free air. Pending review by Radiologist  Recorded by Cyndy Tompkins ED Scribe, as dictated by Rory Cordoba PA-C. Medical Decision Making   I am the first provider for this patient. I reviewed the vital signs, available nursing notes, past medical history, past surgical history, family history and social history. Vital Signs-Reviewed the patient's vital signs. Patient Vitals for the past 12 hrs:   Temp Pulse Resp BP SpO2   08/31/17 2315 - 86 18 121/70 100 %   08/31/17 2007 97.4 °F (36.3 °C) 83 16 119/85 100 %       DDx: chronic abd pain, constipation, pancreatitis, appendicitis, SBO, UTI, pyelonephritis, kidney stone, ovarian cyst, doubt ischemic bowel/mesenteric vein thrombosis    Pulse Oximetry Analysis - Normal 100% on RA     Old Medical Records: Old medical records. Procedures:   Procedures    ED Course:  8:36 PM  Initial assessment performed. The patients presenting problems have been discussed, and they are in agreement with the care plan formulated and outlined with them. I have encouraged them to ask questions as they arise throughout their visit. PROGRESS NOTE:   10:36 PM  Pt has been re-examined by Rory Cordoba PA-C. Pt is still having pain, states it is coming in waves. The labs are unremarkable. XR show increased stool but no obstruction, free air, or obvious ureter stone.  CT is down at this time, but should be up within the next 90 minutes. Will wait to get CT here to rule out appendicitis or other acute process. Pt is aware and agrees to wait. PROGRESS NOTE:   12:13 AM  [facility] The pt reports worsening pain, still coming in waves. Awaiting CT, will give Dilaudid and another dose of Zofran in the meantime. Medications Given in the ED:  Medications   sodium chloride 0.9 % bolus infusion 1,000 mL (0 mL IntraVENous IV Completed 8/31/17 2314)   morphine injection 4 mg (4 mg IntraVENous Given 8/31/17 2129)   ondansetron (ZOFRAN) injection 4 mg (4 mg IntraVENous Given 8/31/17 2139)   dicyclomine (BENTYL) capsule 10 mg (10 mg Oral Given 8/31/17 2314)   HYDROmorphone (PF) (DILAUDID) injection 0.5 mg (0.5 mg IntraVENous Given 9/1/17 0023)   ondansetron (ZOFRAN) injection 4 mg (4 mg IntraVENous Given 9/1/17 0022)   iopamidol (ISOVUE 300) 61 % contrast injection 100 mL (100 mL IntraVENous Given 9/1/17 0046)     PROGRESS NOTE:   1:30 AM  Pt has been re-examined by KIM Liu. Pt feeling much better, states her acute pain is nearly gone, still has chronic left-sided pain. CT shows large retained stool, no obstruction, nonspecific dilated loops of small bowel, possible enteritis. Appendix not definitively identified, but no surrounding inflammatory changes or elevated WBC to suggest appendicitis. Advised pt to restart bentyl, miralax and continue meds. RTED if sx worsen or fever. Pt very appreciative and agrees with plan. Daughter at bedside to drive home. Written by KIM Liu. Willie Dials DISCHARGE NOTE:  1:44 AM  David Coppola's  results have been reviewed with her. She has been counseled regarding her diagnosis, treatment, and plan. She verbally conveys understanding and agreement of the signs, symptoms, diagnosis, treatment and prognosis and additionally agrees to follow up as discussed.   She also agrees with the care-plan and conveys that all of her questions have been answered. I have also provided discharge instructions for her that include: educational information regarding their diagnosis and treatment, and list of reasons why they would want to return to the ED prior to their follow-up appointment, should her condition change. Diagnosis   Clinical Impression:   1. Constipation, unspecified constipation type    2. Enteritis           Follow-up Information     Follow up With Details Comments [address], [place] Schedule an appointment as soon as possible for a visit  [address] [place] [address] Dr [address] [id number]  [phone]      [facility] EMERGENCY DEPT  As needed, If symptoms worsen [address]  [phone]          Current Discharge Medication List      START taking these medications    Details   ondansetron (ZOFRAN ODT) 4 mg disintegrating tablet Take 1 Tab by mouth every eight (8) hours as needed for Nausea. Qty: 12 Tab, Refills: 0         CONTINUE these medications which have NOT CHANGED    Details   topiramate (TOPAMAX) 100 mg tablet Take  by mouth two (2) times a day. rivaroxaban (XARELTO) 20 mg tab tablet Take  by mouth daily. dicyclomine (BENTYL) 10 mg capsule Take 10 mg by mouth 4 times daily (before meals and nightly). SUMATRIPTAN SUCCINATE (IMITREX SC) by SubCUTAneous route.      midodrine (PROAMITINE) 5 mg tablet Take  by mouth three (3) times daily. _______________________________   Attestations: This note is prepared by Jimmie Rajan and Bruno Hensley, acting as Scribes for Gallo Sanchez PA-C on 8:36 PM on 8/31/2017 . Gallo Sanchez PA-C: The scribe's documentation has been prepared under my direction and personally reviewed by me in its entirety.   _______________________________

## 2017-09-01 NOTE — ED NOTES
I have reviewed discharge instructions with the patient. The patient verbalized understanding. Patient armband removed and shredded. patient given 1 prescriptions. Discussed side effects with patient. patient verbalized understanding. Patient ambulated to lobby with steady gait with daughter. Patient discharged in stable condition to care of self.

## 2017-09-01 NOTE — DISCHARGE INSTRUCTIONS
Colitis: Care Instructions  Your Care Instructions  Colitis is the medical term for swelling (inflammation) of the intestine. It can be caused by different things, such as an infection or loss of blood flow in the intestine. Other causes are problems like Crohn's disease or ulcerative colitis. Symptoms may include fever, diarrhea that may be bloody, or belly pain. Sometimes symptoms go away without treatment. But you may need treatment or more tests, such as blood tests or a stool test. Or you may need imaging tests like a CT scan or a colonoscopy. In some cases, the doctor may want to test a sample of tissue from the intestine. This test is called a biopsy. The doctor has checked you carefully, but problems can develop later. If you notice any problems or new symptoms, get medical treatment right away. Follow-up care is a key part of your treatment and safety. Be sure to make and go to all appointments, and call your doctor if you are having problems. It's also a good idea to know your test results and keep a list of the medicines you take. How can you care for yourself at home? · Rest until you feel better. · Your doctor may recommend that you eat bland foods. These include rice, dry toast or crackers, bananas, and applesauce. · To prevent dehydration, drink plenty of fluids. Choose water and other caffeine-free clear liquids until you feel better. If you have kidney, heart, or liver disease and have to limit fluids, talk with your doctor before you increase the amount of fluids you drink. · Be safe with medicines. Take your medicines exactly as prescribed. Call your doctor if you think you are having a problem with your medicine. You will get more details on the specific medicines your doctor prescribes. When should you call for help? Call 911 anytime you think you may need emergency care. For example, call if:  · You passed out (lost consciousness).   · You vomit blood or what looks like coffee grounds. · Your stools are maroon or very bloody. Call your doctor now or seek immediate medical care if:  · You have new or worse pain. · You have a new or higher fever. · You have new or worse symptoms. · You cannot keep fluids or medicines down. Watch closely for changes in your health, and be sure to contact your doctor if:  · You do not get better as expected. Where can you learn more? Go to http://radha-hailey.info/. Sanjay Cortes in the search box to learn more about \"Colitis: Care Instructions. \"  Current as of: August 9, 2016  Content Version: 11.3  © 4872-6619 [employer]. Care instructions adapted under license by [employer] (which disclaims liability or warranty for this information). If you have questions about a medical condition or this instruction, always ask your healthcare professional. [place] [address] any warranty or liability for your use of this information. Constipation: Care Instructions  Your Care Instructions  Constipation means that you have a hard time passing stools (bowel movements). People pass stools from 3 times a day to once every 3 days. What is normal for you may be different. Constipation may occur with pain in the rectum and cramping. The pain may get worse when you try to pass stools. Sometimes there are small amounts of bright red blood on toilet paper or the surface of stools. This is because of enlarged veins near the rectum (hemorrhoids). A few changes in your diet and lifestyle may help you avoid ongoing constipation. Your doctor may also prescribe medicine to help loosen your stool. Some medicines can cause constipation. These include pain medicines and antidepressants. Tell your doctor about all the medicines you take. Your doctor may want to make a medicine change to ease your symptoms. Follow-up care is a key part of your treatment and safety.  Be sure to make and go to all appointments, and call your doctor if you are having problems. It's also a good idea to know your test results and keep a list of the medicines you take. How can you care for yourself at home? · Drink plenty of fluids, enough so that your urine is light yellow or clear like water. If you have kidney, heart, or liver disease and have to limit fluids, talk with your doctor before you increase the amount of fluids you drink. · Include high-fiber foods in your diet each day. These include fruits, vegetables, beans, and whole grains. · Get at least 30 minutes of exercise on most days of the week. Walking is a good choice. You also may want to do other activities, such as running, swimming, cycling, or playing tennis or team sports. · Take a fiber supplement, such as Citrucel or Metamucil, every day. Read and follow all instructions on the label. · Schedule time each day for a bowel movement. A daily routine may help. Take your time having your bowel movement. · Support your feet with a small step stool when you sit on the toilet. This helps flex your hips and places your pelvis in a squatting position. · Your doctor may recommend an over-the-counter laxative to relieve your constipation. Examples are Milk of Magnesia and MiraLax. Read and follow all instructions on the label. Do not use laxatives on a long-term basis. When should you call for help? Call your doctor now or seek immediate medical care if:  · You have new or worse belly pain. · You have new or worse nausea or vomiting. · You have blood in your stools. Watch closely for changes in your health, and be sure to contact your doctor if:  · Your constipation is getting worse. · You do not get better as expected. Where can you learn more? Go to http://radha-hailey.info/. Enter 21  in the search box to learn more about \"Constipation: Care Instructions. \"  Current as of: March 20, 2017  Content Version: 11.3  © 7601-4398 [employer]. Care instructions adapted under license by [employer] (which disclaims liability or warranty for this information). If you have questions about a medical condition or this instruction, always ask your healthcare professional. [address] any warranty or liability for your use of this information.

## 2017-09-21 RX ORDER — SODIUM CHLORIDE 9 MG/ML
20 INJECTION, SOLUTION INTRAVENOUS CONTINUOUS
Status: CANCELLED | OUTPATIENT
Start: 2017-09-21

## 2017-09-28 ENCOUNTER — ANESTHESIA EVENT (OUTPATIENT)
Dept: CARDIAC CATH/INVASIVE PROCEDURES | Age: 37
End: 2017-09-28
Payer: OTHER GOVERNMENT

## 2017-09-28 ENCOUNTER — HOSPITAL ENCOUNTER (OUTPATIENT)
Dept: INTERVENTIONAL RADIOLOGY/VASCULAR | Age: 37
Discharge: HOME | End: 2017-09-28
Attending: RADIOLOGY

## 2017-09-28 ENCOUNTER — HOSPITAL ENCOUNTER (OUTPATIENT)
Dept: INTERVENTIONAL RADIOLOGY/VASCULAR | Age: 37
Setting detail: OBSERVATION
Discharge: HOME | End: 2017-09-29
Attending: RADIOLOGY | Admitting: INTERNAL MEDICINE
Payer: OTHER GOVERNMENT

## 2017-09-28 ENCOUNTER — ANESTHESIA (OUTPATIENT)
Dept: CARDIAC CATH/INVASIVE PROCEDURES | Age: 37
End: 2017-09-28
Payer: OTHER GOVERNMENT

## 2017-09-28 DIAGNOSIS — R10.9 ABDOMINAL PAIN, UNSPECIFIED SITE: ICD-10-CM

## 2017-09-28 DIAGNOSIS — R10.9 ACUTE ABDOMINAL PAIN: ICD-10-CM

## 2017-09-28 PROBLEM — I86.8: Status: ACTIVE | Noted: 2017-09-28

## 2017-09-28 LAB
ALBUMIN SERPL-MCNC: 3.8 G/DL (ref 3.4–5)
ALBUMIN/GLOB SERPL: 1.1 {RATIO} (ref 0.8–1.7)
ALP SERPL-CCNC: 50 U/L (ref 45–117)
ALT SERPL-CCNC: 25 U/L (ref 13–56)
ANION GAP SERPL CALC-SCNC: 6 MMOL/L (ref 3–18)
APTT PPP: 28.4 SEC (ref 23–36.4)
AST SERPL-CCNC: 23 U/L (ref 15–37)
BILIRUB DIRECT SERPL-MCNC: <0.1 MG/DL (ref 0–0.2)
BILIRUB SERPL-MCNC: 0.1 MG/DL (ref 0.2–1)
BUN SERPL-MCNC: 7 MG/DL (ref 7–18)
BUN/CREAT SERPL: 9 (ref 12–20)
CALCIUM SERPL-MCNC: 8.7 MG/DL (ref 8.5–10.1)
CHLORIDE SERPL-SCNC: 108 MMOL/L (ref 100–108)
CO2 SERPL-SCNC: 28 MMOL/L (ref 21–32)
CREAT SERPL-MCNC: 0.74 MG/DL (ref 0.6–1.3)
ERYTHROCYTE [DISTWIDTH] IN BLOOD BY AUTOMATED COUNT: 16.9 % (ref 11.6–14.5)
GLOBULIN SER CALC-MCNC: 3.4 G/DL (ref 2–4)
GLUCOSE SERPL-MCNC: 89 MG/DL (ref 74–99)
HCT VFR BLD AUTO: 29.9 % (ref 35–45)
HGB BLD-MCNC: 9.1 G/DL (ref 12–16)
INR PPP: 1.3 (ref 0.8–1.2)
MCH RBC QN AUTO: 25.6 PG (ref 24–34)
MCHC RBC AUTO-ENTMCNC: 30.4 G/DL (ref 31–37)
MCV RBC AUTO: 84 FL (ref 74–97)
PLATELET # BLD AUTO: 409 K/UL (ref 135–420)
PMV BLD AUTO: 10 FL (ref 9.2–11.8)
POTASSIUM SERPL-SCNC: 3.5 MMOL/L (ref 3.5–5.5)
PROT SERPL-MCNC: 7.2 G/DL (ref 6.4–8.2)
PROTHROMBIN TIME: 15.4 SEC (ref 11.5–15.2)
RBC # BLD AUTO: 3.56 M/UL (ref 4.2–5.3)
SODIUM SERPL-SCNC: 142 MMOL/L (ref 136–145)
WBC # BLD AUTO: 9.2 K/UL (ref 4.6–13.2)

## 2017-09-28 PROCEDURE — 76060000036 HC ANESTHESIA 2.5 TO 3 HR

## 2017-09-28 PROCEDURE — 77030026438 HC STYL ET INTUB CARD -A: Performed by: ANESTHESIOLOGY

## 2017-09-28 PROCEDURE — 99218 HC RM OBSERVATION: CPT

## 2017-09-28 PROCEDURE — 74011000272 HC RX REV CODE- 272

## 2017-09-28 PROCEDURE — C1769 GUIDE WIRE: HCPCS

## 2017-09-28 PROCEDURE — 74011250636 HC RX REV CODE- 250/636: Performed by: RADIOLOGY

## 2017-09-28 PROCEDURE — 85027 COMPLETE CBC AUTOMATED: CPT | Performed by: RADIOLOGY

## 2017-09-28 PROCEDURE — 74011250636 HC RX REV CODE- 250/636

## 2017-09-28 PROCEDURE — 74011250637 HC RX REV CODE- 250/637: Performed by: INTERNAL MEDICINE

## 2017-09-28 PROCEDURE — 77030037141 HC CATH MIC PX SLIM PENU -F

## 2017-09-28 PROCEDURE — 77030007178 HC COIL EMB NEST COOK -B

## 2017-09-28 PROCEDURE — 74011250636 HC RX REV CODE- 250/636: Performed by: INTERNAL MEDICINE

## 2017-09-28 PROCEDURE — 80076 HEPATIC FUNCTION PANEL: CPT | Performed by: RADIOLOGY

## 2017-09-28 PROCEDURE — 85730 THROMBOPLASTIN TIME PARTIAL: CPT | Performed by: RADIOLOGY

## 2017-09-28 PROCEDURE — 77030022017 HC DRSG HEMO QCLOT ZMED -A

## 2017-09-28 PROCEDURE — 75774 ARTERY X-RAY EACH VESSEL: CPT

## 2017-09-28 PROCEDURE — 74011000250 HC RX REV CODE- 250

## 2017-09-28 PROCEDURE — 85610 PROTHROMBIN TIME: CPT | Performed by: RADIOLOGY

## 2017-09-28 PROCEDURE — C1887 CATHETER, GUIDING: HCPCS

## 2017-09-28 PROCEDURE — 36481 INSERTION OF CATHETER VEIN: CPT

## 2017-09-28 PROCEDURE — 77030008683 HC TU ET CUF COVD -A: Performed by: ANESTHESIOLOGY

## 2017-09-28 PROCEDURE — 74011000250 HC RX REV CODE- 250: Performed by: RADIOLOGY

## 2017-09-28 PROCEDURE — C1713 ANCHOR/SCREW BN/BN,TIS/BN: HCPCS

## 2017-09-28 PROCEDURE — 80048 BASIC METABOLIC PNL TOTAL CA: CPT | Performed by: RADIOLOGY

## 2017-09-28 PROCEDURE — C1894 INTRO/SHEATH, NON-LASER: HCPCS

## 2017-09-28 PROCEDURE — 74011636320 HC RX REV CODE- 636/320: Performed by: RADIOLOGY

## 2017-09-28 RX ORDER — MIDODRINE HYDROCHLORIDE 2.5 MG/1
5 TABLET ORAL
Status: DISCONTINUED | OUTPATIENT
Start: 2017-09-29 | End: 2017-09-29 | Stop reason: HOSPADM

## 2017-09-28 RX ORDER — MORPHINE SULFATE 2 MG/ML
2 INJECTION, SOLUTION INTRAMUSCULAR; INTRAVENOUS
Status: DISCONTINUED | OUTPATIENT
Start: 2017-09-28 | End: 2017-09-29

## 2017-09-28 RX ORDER — PROPOFOL 10 MG/ML
INJECTION, EMULSION INTRAVENOUS AS NEEDED
Status: DISCONTINUED | OUTPATIENT
Start: 2017-09-28 | End: 2017-09-28 | Stop reason: HOSPADM

## 2017-09-28 RX ORDER — SODIUM CHLORIDE 0.9 % (FLUSH) 0.9 %
5-10 SYRINGE (ML) INJECTION AS NEEDED
Status: DISCONTINUED | OUTPATIENT
Start: 2017-09-28 | End: 2017-09-28 | Stop reason: HOSPADM

## 2017-09-28 RX ORDER — ONDANSETRON 2 MG/ML
4 INJECTION INTRAMUSCULAR; INTRAVENOUS
Status: DISCONTINUED | OUTPATIENT
Start: 2017-09-28 | End: 2017-09-29 | Stop reason: HOSPADM

## 2017-09-28 RX ORDER — MIDAZOLAM HYDROCHLORIDE 1 MG/ML
INJECTION, SOLUTION INTRAMUSCULAR; INTRAVENOUS AS NEEDED
Status: DISCONTINUED | OUTPATIENT
Start: 2017-09-28 | End: 2017-09-28 | Stop reason: HOSPADM

## 2017-09-28 RX ORDER — HYDROMORPHONE HYDROCHLORIDE 1 MG/ML
1 INJECTION, SOLUTION INTRAMUSCULAR; INTRAVENOUS; SUBCUTANEOUS
Status: DISCONTINUED | OUTPATIENT
Start: 2017-09-28 | End: 2017-09-28

## 2017-09-28 RX ORDER — HEPARIN SODIUM 200 [USP'U]/100ML
INJECTION, SOLUTION INTRAVENOUS
Status: DISPENSED
Start: 2017-09-28 | End: 2017-09-29

## 2017-09-28 RX ORDER — LIDOCAINE HYDROCHLORIDE 10 MG/ML
30 INJECTION, SOLUTION EPIDURAL; INFILTRATION; INTRACAUDAL; PERINEURAL ONCE
Status: COMPLETED | OUTPATIENT
Start: 2017-09-28 | End: 2017-09-28

## 2017-09-28 RX ORDER — SUCCINYLCHOLINE CHLORIDE 20 MG/ML
INJECTION INTRAMUSCULAR; INTRAVENOUS AS NEEDED
Status: DISCONTINUED | OUTPATIENT
Start: 2017-09-28 | End: 2017-09-28 | Stop reason: HOSPADM

## 2017-09-28 RX ORDER — DOCUSATE SODIUM 100 MG/1
100 CAPSULE, LIQUID FILLED ORAL 2 TIMES DAILY
Status: DISCONTINUED | OUTPATIENT
Start: 2017-09-28 | End: 2017-09-29 | Stop reason: HOSPADM

## 2017-09-28 RX ORDER — CEFAZOLIN SODIUM 2 G/50ML
2 SOLUTION INTRAVENOUS ONCE
Status: COMPLETED | OUTPATIENT
Start: 2017-09-28 | End: 2017-09-28

## 2017-09-28 RX ORDER — FENTANYL CITRATE 50 UG/ML
INJECTION, SOLUTION INTRAMUSCULAR; INTRAVENOUS AS NEEDED
Status: DISCONTINUED | OUTPATIENT
Start: 2017-09-28 | End: 2017-09-28 | Stop reason: HOSPADM

## 2017-09-28 RX ORDER — HYDROMORPHONE HYDROCHLORIDE 2 MG/ML
INJECTION, SOLUTION INTRAMUSCULAR; INTRAVENOUS; SUBCUTANEOUS
Status: COMPLETED
Start: 2017-09-28 | End: 2017-09-28

## 2017-09-28 RX ORDER — SODIUM CHLORIDE, SODIUM LACTATE, POTASSIUM CHLORIDE, CALCIUM CHLORIDE 600; 310; 30; 20 MG/100ML; MG/100ML; MG/100ML; MG/100ML
75 INJECTION, SOLUTION INTRAVENOUS CONTINUOUS
Status: DISCONTINUED | OUTPATIENT
Start: 2017-09-28 | End: 2017-09-28 | Stop reason: HOSPADM

## 2017-09-28 RX ORDER — LIDOCAINE HYDROCHLORIDE 20 MG/ML
INJECTION, SOLUTION EPIDURAL; INFILTRATION; INTRACAUDAL; PERINEURAL AS NEEDED
Status: DISCONTINUED | OUTPATIENT
Start: 2017-09-28 | End: 2017-09-28 | Stop reason: HOSPADM

## 2017-09-28 RX ORDER — HYDROMORPHONE HYDROCHLORIDE 2 MG/ML
0.5 INJECTION, SOLUTION INTRAMUSCULAR; INTRAVENOUS; SUBCUTANEOUS
Status: COMPLETED | OUTPATIENT
Start: 2017-09-28 | End: 2017-09-28

## 2017-09-28 RX ORDER — ROCURONIUM BROMIDE 10 MG/ML
INJECTION, SOLUTION INTRAVENOUS AS NEEDED
Status: DISCONTINUED | OUTPATIENT
Start: 2017-09-28 | End: 2017-09-28 | Stop reason: HOSPADM

## 2017-09-28 RX ORDER — ONDANSETRON 2 MG/ML
INJECTION INTRAMUSCULAR; INTRAVENOUS AS NEEDED
Status: DISCONTINUED | OUTPATIENT
Start: 2017-09-28 | End: 2017-09-28 | Stop reason: HOSPADM

## 2017-09-28 RX ORDER — FENTANYL CITRATE 50 UG/ML
50 INJECTION, SOLUTION INTRAMUSCULAR; INTRAVENOUS ONCE
Status: COMPLETED | OUTPATIENT
Start: 2017-09-28 | End: 2017-09-28

## 2017-09-28 RX ORDER — DIPHENHYDRAMINE HYDROCHLORIDE 50 MG/ML
12.5 INJECTION, SOLUTION INTRAMUSCULAR; INTRAVENOUS
Status: DISCONTINUED | OUTPATIENT
Start: 2017-09-28 | End: 2017-09-28 | Stop reason: HOSPADM

## 2017-09-28 RX ORDER — ZOLPIDEM TARTRATE 5 MG/1
5 TABLET ORAL
Status: DISCONTINUED | OUTPATIENT
Start: 2017-09-28 | End: 2017-09-29 | Stop reason: HOSPADM

## 2017-09-28 RX ORDER — TOPIRAMATE 100 MG/1
100 TABLET, FILM COATED ORAL 2 TIMES DAILY
Status: DISCONTINUED | OUTPATIENT
Start: 2017-09-29 | End: 2017-09-29 | Stop reason: HOSPADM

## 2017-09-28 RX ORDER — SODIUM CHLORIDE 9 MG/ML
20 INJECTION, SOLUTION INTRAVENOUS CONTINUOUS
Status: DISCONTINUED | OUTPATIENT
Start: 2017-09-28 | End: 2017-09-29 | Stop reason: HOSPADM

## 2017-09-28 RX ORDER — OXYCODONE AND ACETAMINOPHEN 5; 325 MG/1; MG/1
1 TABLET ORAL
Status: DISCONTINUED | OUTPATIENT
Start: 2017-09-28 | End: 2017-09-29

## 2017-09-28 RX ORDER — IODIXANOL 320 MG/ML
200 INJECTION, SOLUTION INTRAVASCULAR
Status: COMPLETED | OUTPATIENT
Start: 2017-09-28 | End: 2017-09-28

## 2017-09-28 RX ORDER — FAMOTIDINE 20 MG/1
20 TABLET, FILM COATED ORAL 2 TIMES DAILY
Status: DISCONTINUED | OUTPATIENT
Start: 2017-09-28 | End: 2017-09-29 | Stop reason: HOSPADM

## 2017-09-28 RX ORDER — DICYCLOMINE HYDROCHLORIDE 10 MG/1
10 CAPSULE ORAL 4 TIMES DAILY
Status: DISCONTINUED | OUTPATIENT
Start: 2017-09-28 | End: 2017-09-29 | Stop reason: HOSPADM

## 2017-09-28 RX ORDER — ONDANSETRON 2 MG/ML
4 INJECTION INTRAMUSCULAR; INTRAVENOUS ONCE
Status: DISCONTINUED | OUTPATIENT
Start: 2017-09-28 | End: 2017-09-28 | Stop reason: HOSPADM

## 2017-09-28 RX ORDER — FENTANYL CITRATE 50 UG/ML
INJECTION, SOLUTION INTRAMUSCULAR; INTRAVENOUS
Status: COMPLETED
Start: 2017-09-28 | End: 2017-09-28

## 2017-09-28 RX ADMIN — Medication 2 MG: at 21:29

## 2017-09-28 RX ADMIN — FENTANYL CITRATE 50 MCG: 50 INJECTION INTRAMUSCULAR; INTRAVENOUS at 17:09

## 2017-09-28 RX ADMIN — LIDOCAINE HYDROCHLORIDE 30 ML: 10 INJECTION, SOLUTION EPIDURAL; INFILTRATION; INTRACAUDAL; PERINEURAL at 14:00

## 2017-09-28 RX ADMIN — HYDROMORPHONE HYDROCHLORIDE 0.5 MG: 2 INJECTION, SOLUTION INTRAMUSCULAR; INTRAVENOUS; SUBCUTANEOUS at 16:27

## 2017-09-28 RX ADMIN — OXYCODONE HYDROCHLORIDE AND ACETAMINOPHEN 1 TABLET: 5; 325 TABLET ORAL at 22:48

## 2017-09-28 RX ADMIN — FENTANYL CITRATE 100 MCG: 50 INJECTION, SOLUTION INTRAMUSCULAR; INTRAVENOUS at 13:40

## 2017-09-28 RX ADMIN — DICYCLOMINE HYDROCHLORIDE 10 MG: 10 CAPSULE ORAL at 23:10

## 2017-09-28 RX ADMIN — HYDROMORPHONE HYDROCHLORIDE 0.5 MG: 2 INJECTION, SOLUTION INTRAMUSCULAR; INTRAVENOUS; SUBCUTANEOUS at 16:15

## 2017-09-28 RX ADMIN — FENTANYL CITRATE 50 MCG: 50 INJECTION, SOLUTION INTRAMUSCULAR; INTRAVENOUS at 17:09

## 2017-09-28 RX ADMIN — SUCCINYLCHOLINE CHLORIDE 100 MG: 20 INJECTION INTRAMUSCULAR; INTRAVENOUS at 13:40

## 2017-09-28 RX ADMIN — LIDOCAINE HYDROCHLORIDE 20 MG: 20 INJECTION, SOLUTION EPIDURAL; INFILTRATION; INTRACAUDAL; PERINEURAL at 13:40

## 2017-09-28 RX ADMIN — HYDROMORPHONE HYDROCHLORIDE 0.5 MG: 2 INJECTION, SOLUTION INTRAMUSCULAR; INTRAVENOUS; SUBCUTANEOUS at 16:48

## 2017-09-28 RX ADMIN — HYDROMORPHONE HYDROCHLORIDE 0.5 MG: 2 INJECTION, SOLUTION INTRAMUSCULAR; INTRAVENOUS; SUBCUTANEOUS at 16:36

## 2017-09-28 RX ADMIN — IODIXANOL 200 ML: 320 INJECTION, SOLUTION INTRAVASCULAR at 14:45

## 2017-09-28 RX ADMIN — ONDANSETRON 4 MG: 2 INJECTION INTRAMUSCULAR; INTRAVENOUS at 13:40

## 2017-09-28 RX ADMIN — ROCURONIUM BROMIDE 10 MG: 10 INJECTION, SOLUTION INTRAVENOUS at 13:40

## 2017-09-28 RX ADMIN — CEFAZOLIN SODIUM 2 G: 2 SOLUTION INTRAVENOUS at 13:36

## 2017-09-28 RX ADMIN — PROPOFOL 150 MG: 10 INJECTION, EMULSION INTRAVENOUS at 13:40

## 2017-09-28 RX ADMIN — SODIUM CHLORIDE 20 ML/HR: 900 INJECTION, SOLUTION INTRAVENOUS at 21:07

## 2017-09-28 RX ADMIN — MIDAZOLAM HYDROCHLORIDE 2 MG: 1 INJECTION, SOLUTION INTRAMUSCULAR; INTRAVENOUS at 13:36

## 2017-09-28 RX ADMIN — FAMOTIDINE 20 MG: 20 TABLET ORAL at 22:48

## 2017-09-28 RX ADMIN — GELATIN ABSORBABLE SPONGE 12-7 MM: 12-7 MISC at 15:29

## 2017-09-28 NOTE — ANESTHESIA POSTPROCEDURE EVALUATION
Post-Anesthesia Evaluation and Assessment    Patient: Rodger Hdz MRN: [id number]  SSN: [id number]    YOB: 1980  Age: 40 y.o. Sex: female       Cardiovascular Function/Vital Signs  Visit Vitals    /82    Pulse 79    Temp 36.8 °C (98.2 °F)    Resp 16    Ht 5' 6\" (1.676 m)    Wt 65.8 kg (145 lb)    SpO2 100%    Breastfeeding No    BMI 23.4 kg/m2       Patient is status post No value filed. anesthesia for * No procedures listed *. Nausea/Vomiting: None    Postoperative hydration reviewed and adequate. Pain:  Pain Scale 1: Numeric (0 - 10) (09/28/17 1628)  Pain Intensity 1: 8 (09/28/17 1628)   Managed    Neurological Status:   Neuro (WDL): Within Defined Limits (09/28/17 1542)   At baseline    Mental Status and Level of Consciousness: Alert and oriented     Pulmonary Status:   O2 Device: Room air (09/28/17 1611)   Adequate oxygenation and airway patent    Complications related to anesthesia: None    Post-anesthesia assessment completed.  No concerns    Signed By: Juno Jensen CRNA     September 28, 2017

## 2017-09-28 NOTE — ANESTHESIA POSTPROCEDURE EVALUATION
Post-Anesthesia Evaluation and Assessment    Patient: Arcelia George MRN: [id number]  SSN: [id number]    YOB: 1980  Age: 40 y.o. Sex: female       Cardiovascular Function/Vital Signs  Visit Vitals    /82    Pulse 79    Temp 36.8 °C (98.2 °F)    Resp 16    Ht 5' 6\" (1.676 m)    Wt 65.8 kg (145 lb)    SpO2 100%    Breastfeeding No    BMI 23.4 kg/m2       Patient is status post No value filed. anesthesia for * No procedures listed *. Nausea/Vomiting: None    Postoperative hydration reviewed and adequate. Pain:  Pain Scale 1: Numeric (0 - 10) (09/28/17 1628)  Pain Intensity 1: 8 (09/28/17 1628)   Managed    Neurological Status:   Neuro (WDL): Within Defined Limits (09/28/17 1542)   At baseline    Mental Status and Level of Consciousness: Arousable    Pulmonary Status:   O2 Device: Room air (09/28/17 1611)   Adequate oxygenation and airway patent    Complications related to anesthesia: None    Post-anesthesia assessment completed.  No concerns    Signed By: Nicole Hawkins MD     September 28, 2017

## 2017-09-28 NOTE — H&P
OUTPATIENT HISTORY AND PHYSICAL      Today 9/28/2017     Indication/Symptoms:   Agustín Masterson is a 40 y.o. female with a history of superior mesenteric vein thrombosis (now patent) and complaints of non-specific abdominal pain. She now presents to IR for an image-guided portal vein venogram with possible embolization of varices under general anesthesia. Of note, the patients most recent abdominal MRI with evidence of a large collateral vessel between the ileocolic branch of the SMV with the splenic vein. Patient has been NPO since midnight and has held her Xarelto for 7 days. Current Meds:    Prior to Admission medications    Medication Sig Start Date End Date Taking? Authorizing Provider   ondansetron (ZOFRAN ODT) 4 mg disintegrating tablet Take 1 Tab by mouth every eight (8) hours as needed for Nausea. 9/1/17   KIM Vázquez   topiramate (TOPAMAX) 100 mg tablet Take  by mouth two (2) times a day. Historical Provider   rivaroxaban (XARELTO) 20 mg tab tablet Take  by mouth daily. Historical Provider   dicyclomine (BENTYL) 10 mg capsule Take 10 mg by mouth 4 times daily (before meals and nightly). Historical Provider   SUMATRIPTAN SUCCINATE (IMITREX SC) by SubCUTAneous route. Historical Provider   midodrine (PROAMITINE) 5 mg tablet Take  by mouth three (3) times daily.     Historical Provider       Allergies:    No Known Allergies    Comorbid Conditions:    Past Medical History:   Diagnosis Date    Abdominal pain     Hypotension           Past Surgical History:   Procedure Laterality Date    BREAST SURGERY PROCEDURE UNLISTED      breast reduction    HX BREAST BIOPSY      HX CHOLECYSTECTOMY      HX CYSTECTOMY      HX GASTRIC BYPASS      HX GI      HX GYN      HX ORTHOPAEDIC      left knee     Data:    Visit Vitals    Ht 5' 6\" (1.676 m)    Wt 65.8 kg (145 lb)    Breastfeeding No    BMI 23.4 kg/m2   :  Recent Labs      09/28/17   1120   PLT  409     Recent Labs      09/28/17 1120   INR  1.3*   APTT  28.4       The H & P and/or progress notes and any available imaging were reviewed. The risks, indications and possible alternatives to the procedure, including doing nothing, were discussed and informed consent was obtained. Physical Exam:      Mental status:   Alert and oriented. Examination specific to the procedure proposed to be performed and any co morbid conditions:   Mallampati classification 2 ,  ASA 2   Heart:   Regular rate. Lungs:   CTAB. No wheezes, rales or rhonchi. The patient is an appropriate candidate to undergo the planned procedure and sedation.     Dvaidson Talbert

## 2017-09-28 NOTE — PROGRESS NOTES
Cath holding summary    Patient escorted to cath holding from waiting area ambulatory, alert and oriented x 4, voicing no complaints. Changed into gown and placed on monitor. NPO since MN. Labs sent, patient has had an hysterectomy so no pregnant test needed, med rec and H&P reviewed on chart. PIV x 1 inserted without difficulty. Family to bedside.

## 2017-09-28 NOTE — ROUTINE PROCESS
TRANSFER - OUT REPORT:    Verbal report given to Fe on Tika Goodman  being transferred to room 512 for routine progression of care       Report consisted of patients Situation, Background, Assessment and   Recommendations(SBAR). Information from the following report(s) SBAR, OR Summary, Intake/Output and MAR was reviewed with the receiving nurse. Opportunity for questions and clarification was provided.       Patient transported with:   [employer]

## 2017-09-28 NOTE — PERIOP NOTES
Verbal face to face update given to  in waiting room. [phone] Phone update given to . [phone] Patient holding to go to room. In PACU delay. Unable to give report.  brought to sit with patient.

## 2017-09-28 NOTE — ANESTHESIA PREPROCEDURE EVALUATION
Anesthesia Evaluation    Physical Exam    Airway  Mallampati: I  TM Distance: 4 - 6 cm  Neck ROM: normal range of motion        Cardiovascular    Rhythm: regular  Rate: normal         Dental  No notable dental hx       Pulmonary  Breath sounds clear to auscultation               Abdominal  GI exam deferred       Other Findings            Anesthetic Plan    ASA: 2  Anesthesia type: general          Induction: Intravenous  Anesthetic plan and risks discussed with: Patient and Family

## 2017-09-28 NOTE — IP AVS SNAPSHOT
Magi Carey 
 
 
 [address] Patient: Simon Shepard MRN: [id number] FIK:[birth year] You are allergic to the following No active allergies Recent Documentation Height Weight Breastfeeding? 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 Your diagnoses also included:  Chronic Abdominal Pain, Neurocardiogenic Syncope Providers Seen During Your Hospitalizations Provider Role Specialty Primary office phone Prince Linus MD Attending Provider Radiology [phone] Samuel Addison MD Attending Provider Internal Medicine [phone] Your Primary Care Physician (PCP) Primary Care Physician Office Phone Office Fax NONE ** None ** ** None ** Follow-up Information Follow up With Details Comments Contact Info Prince Linus MD On 10/11/2017 Appointment at 2:30pm [address] [place] [address] [address] [place] [id number] 
[phone] None   requested not to make the PCP appt None ([phone]) Patient stated that they have no PCP Current Discharge Medication List  
  
START taking these medications Dose & Instructions Dispensing Information Comments Morning Noon Evening Bedtime  
 oxyCODONE-acetaminophen  mg per tablet Commonly known as:  PERCOCET 10 Your last dose was: Your next dose is:    
   
   
 Dose:  1 Tab Take 1 Tab by mouth every eight (8) hours as needed. Max Daily Amount: 3 Tabs. Quantity:  10 Tab Refills:  0 CONTINUE these medications which have CHANGED Dose & Instructions Dispensing Information Comments Morning Noon Evening Bedtime  
 rivaroxaban 20 mg Tab tablet Commonly known as:  Stephanie Lares What changed:   
- how much to take 
- additional instructions Your last dose was: Your next dose is:    
   
   
 Dose:  20 mg Take 1 Tab by mouth daily. Start from 9/30/2017 Quantity:  1 Tab Refills:  0 CONTINUE these medications which have NOT CHANGED Dose & Instructions Dispensing Information Comments Morning Noon Evening Bedtime BENTYL 10 mg capsule Generic drug:  dicyclomine Your last dose was: Your next dose is:    
   
   
 Dose:  10 mg Take 10 mg by mouth 4 times daily (before meals and nightly). Refills:  0 IMITREX SC Your last dose was: Your next dose is:    
   
   
 by SubCUTAneous route. Refills:  0  
     
   
   
   
  
 midodrine 5 mg tablet Commonly known as:  Fazal Christopher Your last dose was: Your next dose is: Take  by mouth three (3) times daily. Refills:  0  
     
   
   
   
  
 ondansetron 4 mg disintegrating tablet Commonly known as:  ZOFRAN ODT Your last dose was: Your next dose is:    
   
   
 Dose:  4 mg Take 1 Tab by mouth every eight (8) hours as needed for Nausea. Quantity:  12 Tab Refills:  0  
     
   
   
   
  
 TOPAMAX 100 mg tablet Generic drug:  topiramate Your last dose was: Your next dose is: Take  by mouth two (2) times a day. Refills:  0 Where to Get Your Medications Information on where to get these meds will be given to you by the nurse or doctor. ! Ask your nurse or doctor about these medications  
  oxyCODONE-acetaminophen  mg per tablet  
 rivaroxaban 20 mg Tab tablet Discharge Instructions Abdominal Pain: Care Instructions Your Care Instructions Abdominal pain has many possible causes. Some aren't serious and get better on their own in a few days. Others need more testing and treatment. If your pain continues or gets worse, you need to be rechecked and may need more tests to find out what is wrong. You may need surgery to correct the problem. Don't ignore new symptoms, such as fever, nausea and vomiting, urination problems, pain that gets worse, and dizziness. These may be signs of a more serious problem. Your doctor may have recommended a follow-up visit in the next 8 to 12 hours. If you are not getting better, you may need more tests or treatment. The doctor has checked you carefully, but problems can develop later. If you notice any problems or new symptoms, get medical treatment right away. Follow-up care is a key part of your treatment and safety. Be sure to make and go to all appointments, and call your doctor if you are having problems. It's also a good idea to know your test results and keep a list of the medicines you take. How can you care for yourself at home? · Rest until you feel better. · To prevent dehydration, drink plenty of fluids, enough so that your urine is light yellow or clear like water. Choose water and other caffeine-free clear liquids until you feel better. If you have kidney, heart, or liver disease and have to limit fluids, talk with your doctor before you increase the amount of fluids you drink. · If your stomach is upset, eat mild foods, such as rice, dry toast or crackers, bananas, and applesauce. Try eating several small meals instead of two or three large ones.  
· Wait until 48 hours after all symptoms have gone away before you have spicy foods, alcohol, and drinks that contain caffeine. · Do not eat foods that are high in fat. · Avoid anti-inflammatory medicines such as aspirin, ibuprofen (Advil, Motrin), and naproxen (Aleve). These can cause stomach upset. Talk to your doctor if you take daily aspirin for another health problem. When should you call for help? Call 911 anytime you think you may need emergency care. For example, call if: 
· You passed out (lost consciousness). · You pass maroon or very bloody stools. · You vomit blood or what looks like coffee grounds. · You have new, severe belly pain. Call your doctor now or seek immediate medical care if: 
· Your pain gets worse, especially if it becomes focused in one area of your belly. · You have a new or higher fever. · Your stools are black and look like tar, or they have streaks of blood. · You have unexpected vaginal bleeding. · You have symptoms of a urinary tract infection. These may include: 
¨ Pain when you urinate. ¨ Urinating more often than usual. 
¨ Blood in your urine. · You are dizzy or lightheaded, or you feel like you may faint. Watch closely for changes in your health, and be sure to contact your doctor if: 
· You are not getting better after 1 day (24 hours). Where can you learn more? Go to http://radha-hailey.info/. Enter T969 in the search box to learn more about \"Abdominal Pain: Care Instructions. \" Current as of: March 20, 2017 Content Version: 11.3 © 7553-7408 [employer]. Care instructions adapted under license by [employer] (which disclaims liability or warranty for this information). If you have questions about a medical condition or this instruction, always ask your healthcare professional. [place] [address] any warranty or liability for your use of this information. Learning About Opioids Introduction Opioids are medicines used to relieve moderate to severe pain. They may be used for a short time for pain, such as after surgery. Or they may be used for long-term pain. They don't cure a health problem. But they help you manage the pain. Opioids relieve pain by changing the way your body feels pain and the way you feel about pain. Sometimes opioids are used for people who can't take other pain medicines. They may be prescribed if you have heart, kidney, or liver problems. For instance, you may take an opioid instead of nonsteroidal anti-inflammatory drugs (NSAIDs). NSAIDs include ibuprofen (Advil, Motrin) and naproxen (Aleve). Opioids are powerful medicines. You may need to take extra steps to stay safe. Examples Opioids or other medicines that contain them include: · Codeine (Tylenol 3). · Hydrocodone (Norco). · Oxycodone (OxyContin, Percocet). Safety tips Taking too much (overdose) of an opioid can cause death. To avoid an overdose: · Be safe with medicines. Take your medicines exactly as prescribed. Call your doctor if you think you are having a problem with your medicine. · Do not break, crush, or chew a pill. Do not cut or tear a patch. · Do not drink alcohol. Do not take recreational or illegal drugs. · Do not drive or operate machinery until the medicine effects are gone. Wait until you can think clearly. · Keep your medicine away from children and pets. Store it in a safe and secure place. · Call your doctor if you miss a dose of your medicine and aren't sure what to do. Do not double your dose. · Check with your doctor or pharmacist before you use any other medicines. This includes over-the-counter medicines. Make sure your doctor knows all of the medicines, vitamins, herbal products, and supplements you take. Taking some medicines together can cause problems. · Talk to your doctor about a naloxone rescue kit.  A kit can help you, and even save your life, if you take too much of an opioid. Side effects Common side effects include: 
· Constipation. · Feeling dizzy or lightheaded. You may feel like you might faint. · Feeling sleepy. · Nausea or vomiting. You may have other side effects or reactions. Check the information that comes with your medicine. What to know about taking this medicine · Your body gets used to opioids if you take them all of the time. You could develop tolerance. This means you need more medicine to get the same pain relief. The danger is that tolerance greatly increases your risk of overdose, breathing emergencies, and death. You may also get dependent on the medicine, which can cause withdrawal symptoms when you stop taking them. Symptoms of withdrawal include nausea, sweating, chills, diarrhea, anxiety, and shaking. But you can avoid these symptoms if you slowly stop taking the medicine as your doctor tells you to. · There is a small risk of addiction when you take opioids. The risk is greater for those who have a history of substance use. Some people have more problems with opioids. Teenagers, older adults, people who have depression, people who have sleep apnea, and those who take high doses of medicine may have more problems with opioids. · Some opioids have acetaminophen in them. Check the labels on all the other medicines you take. This includes over-the-counter drugs. Many medicines have acetaminophen. Do not take others with acetaminophen in them unless your doctor has told you to. Taking too much acetaminophen can be harmful. Talk to your doctor or pharmacist if you have questions about this. · Be sure you know how to safely get rid of any leftover medicine. Talk to your doctor or pharmacist about how to do this. Ask for written instructions. When should you call for help? Call 911 anytime you think you may need emergency care. For example, call if: · You have symptoms of a severe allergic reaction. These may include: 
¨ Sudden raised, red areas (hives) all over your body. ¨ Swelling of the throat, mouth, lips, or tongue. ¨ Trouble breathing. ¨ Passing out (losing consciousness). Or you may feel very lightheaded or suddenly feel weak, confused, or restless. · You have signs of an overdose. These include: ¨ Cold, clammy skin. ¨ Confusion. ¨ Severe nervousness or restlessness. ¨ Severe dizziness, drowsiness, or weakness. ¨ Slow breathing. ¨ Seizures. Call your doctor now or seek immediate medical care if: 
· You have symptoms of an allergic reaction, such as: ¨ A rash or hives (raised, red areas on the skin). ¨ Itching. ¨ Swelling. ¨ Belly pain, nausea, or vomiting. Watch closely for changes in your health, and be sure to contact your doctor if: 
· Your medicine is not helping with the pain. · You are having side effects, such as constipation. Where can you learn more? Go to http://radha-hailey.info/. Enter B367 in the search box to learn more about \"Learning About Opioids. \" Current as of: February 7, 2017 Content Version: 11.3 © 3186-9175 [employer]. Care instructions adapted under license by [employer] (which disclaims liability or warranty for this information). If you have questions about a medical condition or this instruction, always ask your healthcare professional. [place] [address] any warranty or liability for your use of this information. Patient armband removed and shredded IntelliDOThart Activation Thank you for requesting access to [employer]. Please follow the instructions below to securely access and download your online medical record. [employer] allows you to send messages to your doctor, view your test results, renew your prescriptions, schedule appointments, and more. How Do I Sign Up? 1. In your internet browser, go to [email or web address] 
 2. Click on the First Time User? Click Here link in the Sign In box. You will be redirect to the New Member Sign Up page. 3. Enter your Openet Access Code exactly as it appears below. You will not need to use this code after youve completed the sign-up process. If you do not sign up before the expiration date, you must request a new code. Openet Access Code: [email or web address] Expires: 2017  1:33 PM (This is the date your Openet access code will ) 4. Enter the last four digits of your Social Security Number (xxxx) and Date of Birth (mm/dd/yyyy) as indicated and click Submit. You will be taken to the next sign-up page. 5. Create a Openet ID. This will be your Openet login ID and cannot be changed, so think of one that is secure and easy to remember. 6. Create a Openet password. You can change your password at any time. 7. Enter your Password Reset Question and Answer. This can be used at a later time if you forget your password. 8. Enter your e-mail address. You will receive e-mail notification when new information is available in [address] 9. Click Sign Up. You can now view and download portions of your medical record. 10. Click the Download Summary menu link to download a portable copy of your medical information. Additional Information If you have questions, please visit the Frequently Asked Questions section of the Openet website at [email or web address] Remember, Openet is NOT to be used for urgent needs. For medical emergencies, dial 911. DISCHARGE SUMMARY from Nurse The following personal items are in your possession at time of discharge: 
 
  
Visual Aid: None PATIENT INSTRUCTIONS: 
 
 
F-face looks uneven A-arms unable to move or move unevenly S-speech slurred or non-existent T-time-call 911 as soon as signs and symptoms begin-DO NOT go Back to bed or wait to see if you get better-TIME IS BRAIN. Warning Signs of HEART ATTACK Call 911 if you have these symptoms: 
? Chest discomfort. Most heart attacks involve discomfort in the center of the chest that lasts more than a few minutes, or that goes away and comes back. It can feel like uncomfortable pressure, squeezing, fullness, or pain. ? Discomfort in other areas of the upper body. Symptoms can include pain or discomfort in one or both arms, the back, neck, jaw, or stomach. ? Shortness of breath with or without chest discomfort. ? Other signs may include breaking out in a cold sweat, nausea, or lightheadedness. Don't wait more than five minutes to call [address]! Fast action can save your life. Calling 911 is almost always the fastest way to get lifesaving treatment. Emergency Medical Services staff can begin treatment when they arrive  up to an hour sooner than if someone gets to the hospital by car. The discharge information has been reviewed with the patient. The patient verbalized understanding. Discharge medications reviewed with the patient and appropriate educational materials and side effects teaching were provided. Discharge Orders None [facility] Announcement We are excited to announce that we are making your provider's discharge notes available to you in [employer]. You will see these notes when they are completed and signed by the physician that discharged you from your recent hospital stay.   If you have any questions or concerns about any information you see in [employer], please call the Health Information Department where you were seen or reach out to your Primary Care Provider for more information about your plan of care. Introducing [facility]! Hever Servin introduces Purple patient portal. Now you can access parts of your medical record, email your doctor's office, and request medication refills online. 1. In your internet browser, go to [email or web address] 2. Click on the First Time User? Click Here link in the Sign In box. You will see the New Member Sign Up page. 3. Enter your Purple Access Code exactly as it appears below. You will not need to use this code after youve completed the sign-up process. If you do not sign up before the expiration date, you must request a new code. · Purple Access Code: [email or web address] Expires: 12/26/2017  1:33 PM 
 
4. Enter the last four digits of your Social Security Number (xxxx) and Date of Birth (mm/dd/yyyy) as indicated and click Submit. You will be taken to the next sign-up page. 5. Create a Purple ID. This will be your Purple login ID and cannot be changed, so think of one that is secure and easy to remember. 6. Create a Purple password. You can change your password at any time. 7. Enter your Password Reset Question and Answer. This can be used at a later time if you forget your password. 8. Enter your e-mail address. You will receive e-mail notification when new information is available in [address] 9. Click Sign Up. You can now view and download portions of your medical record. 10. Click the Download Summary menu link to download a portable copy of your medical information. If you have questions, please visit the Frequently Asked Questions section of the Purple website. Remember, Purple is NOT to be used for urgent needs. For medical emergencies, dial 911. Now available from your iPhone and Android! General Information Please provide this summary of care documentation to your next provider.  
  
  
    
    
 Patient Signature: ____________________________________________________________ Date:  ____________________________________________________________  
  
Kiara Ericka Provider Signature:  ____________________________________________________________ Date:  ____________________________________________________________

## 2017-09-28 NOTE — PROCEDURES
RADIOLOGY POST PROCEDURE NOTE     September 28, 2017       3:37 PM     Preoperative Diagnosis:  Mesenteric varix. Postoperative Diagnosis:  Same. :  Dr. Tanya Garcia    Assistant:  None. Type of Anesthesia: 1% plain lidocaine and GETA. Procedure/Description:  Image guided mesenteric varix embolization. Findings:   No bleeding. Estimated blood Loss:  Minimal    Specimen Removed:   no    Blood transfusions:  None. Implants:  See above.     Complications: None    Condition: Stable    Discharge Plan:  continue present therapy    Cynthia Thorne MD

## 2017-09-29 VITALS
TEMPERATURE: 98.3 F | HEIGHT: 66 IN | RESPIRATION RATE: 18 BRPM | DIASTOLIC BLOOD PRESSURE: 66 MMHG | SYSTOLIC BLOOD PRESSURE: 116 MMHG | BODY MASS INDEX: 23.3 KG/M2 | OXYGEN SATURATION: 94 % | HEART RATE: 75 BPM | WEIGHT: 145 LBS

## 2017-09-29 LAB
ANION GAP SERPL CALC-SCNC: 7 MMOL/L (ref 3–18)
BASOPHILS # BLD: 0 K/UL (ref 0–0.1)
BASOPHILS NFR BLD: 0 % (ref 0–2)
BUN SERPL-MCNC: 4 MG/DL (ref 7–18)
BUN/CREAT SERPL: 5 (ref 12–20)
CALCIUM SERPL-MCNC: 8.6 MG/DL (ref 8.5–10.1)
CHLORIDE SERPL-SCNC: 108 MMOL/L (ref 100–108)
CO2 SERPL-SCNC: 26 MMOL/L (ref 21–32)
CREAT SERPL-MCNC: 0.73 MG/DL (ref 0.6–1.3)
DIFFERENTIAL METHOD BLD: ABNORMAL
EOSINOPHIL # BLD: 0.1 K/UL (ref 0–0.4)
EOSINOPHIL NFR BLD: 1 % (ref 0–5)
ERYTHROCYTE [DISTWIDTH] IN BLOOD BY AUTOMATED COUNT: 17.2 % (ref 11.6–14.5)
GLUCOSE SERPL-MCNC: 112 MG/DL (ref 74–99)
HCG UR QL: NEGATIVE
HCT VFR BLD AUTO: 29.1 % (ref 35–45)
HGB BLD-MCNC: 8.7 G/DL (ref 12–16)
LYMPHOCYTES # BLD: 2.1 K/UL (ref 0.9–3.6)
LYMPHOCYTES NFR BLD: 18 % (ref 21–52)
MCH RBC QN AUTO: 25.4 PG (ref 24–34)
MCHC RBC AUTO-ENTMCNC: 29.9 G/DL (ref 31–37)
MCV RBC AUTO: 84.8 FL (ref 74–97)
MONOCYTES # BLD: 1 K/UL (ref 0.05–1.2)
MONOCYTES NFR BLD: 9 % (ref 3–10)
NEUTS SEG # BLD: 8.1 K/UL (ref 1.8–8)
NEUTS SEG NFR BLD: 72 % (ref 40–73)
PLATELET # BLD AUTO: 386 K/UL (ref 135–420)
PMV BLD AUTO: 10 FL (ref 9.2–11.8)
POTASSIUM SERPL-SCNC: 3.5 MMOL/L (ref 3.5–5.5)
RBC # BLD AUTO: 3.43 M/UL (ref 4.2–5.3)
SODIUM SERPL-SCNC: 141 MMOL/L (ref 136–145)
WBC # BLD AUTO: 11.3 K/UL (ref 4.6–13.2)

## 2017-09-29 PROCEDURE — 81025 URINE PREGNANCY TEST: CPT | Performed by: RADIOLOGY

## 2017-09-29 PROCEDURE — 80048 BASIC METABOLIC PNL TOTAL CA: CPT | Performed by: INTERNAL MEDICINE

## 2017-09-29 PROCEDURE — 74011250637 HC RX REV CODE- 250/637: Performed by: RADIOLOGY

## 2017-09-29 PROCEDURE — 99218 HC RM OBSERVATION: CPT

## 2017-09-29 PROCEDURE — 74011250637 HC RX REV CODE- 250/637: Performed by: INTERNAL MEDICINE

## 2017-09-29 PROCEDURE — 36415 COLL VENOUS BLD VENIPUNCTURE: CPT | Performed by: INTERNAL MEDICINE

## 2017-09-29 PROCEDURE — 74011250636 HC RX REV CODE- 250/636: Performed by: INTERNAL MEDICINE

## 2017-09-29 PROCEDURE — 85025 COMPLETE CBC W/AUTO DIFF WBC: CPT | Performed by: INTERNAL MEDICINE

## 2017-09-29 RX ORDER — OXYCODONE AND ACETAMINOPHEN 10; 325 MG/1; MG/1
1 TABLET ORAL
Qty: 10 TAB | Refills: 0 | Status: SHIPPED | OUTPATIENT
Start: 2017-09-29 | End: 2018-01-02

## 2017-09-29 RX ORDER — OXYCODONE AND ACETAMINOPHEN 10; 325 MG/1; MG/1
1 TABLET ORAL
Status: DISCONTINUED | OUTPATIENT
Start: 2017-09-29 | End: 2017-09-29 | Stop reason: HOSPADM

## 2017-09-29 RX ADMIN — DICYCLOMINE HYDROCHLORIDE 10 MG: 10 CAPSULE ORAL at 08:44

## 2017-09-29 RX ADMIN — ZOLPIDEM TARTRATE 5 MG: 5 TABLET ORAL at 02:15

## 2017-09-29 RX ADMIN — FAMOTIDINE 20 MG: 20 TABLET ORAL at 08:44

## 2017-09-29 RX ADMIN — OXYCODONE HYDROCHLORIDE AND ACETAMINOPHEN 1 TABLET: 5; 325 TABLET ORAL at 04:23

## 2017-09-29 RX ADMIN — TOPIRAMATE 100 MG: 100 TABLET, FILM COATED ORAL at 08:44

## 2017-09-29 RX ADMIN — Medication 2 MG: at 05:37

## 2017-09-29 RX ADMIN — OXYCODONE HYDROCHLORIDE AND ACETAMINOPHEN 1 TABLET: 5; 325 TABLET ORAL at 08:48

## 2017-09-29 RX ADMIN — MIDODRINE HYDROCHLORIDE 5 MG: 2.5 TABLET ORAL at 08:45

## 2017-09-29 RX ADMIN — Medication 2 MG: at 01:11

## 2017-09-29 RX ADMIN — DOCUSATE SODIUM 100 MG: 100 CAPSULE, LIQUID FILLED ORAL at 08:45

## 2017-09-29 RX ADMIN — OXYCODONE HYDROCHLORIDE AND ACETAMINOPHEN 1 TABLET: 10; 325 TABLET ORAL at 13:34

## 2017-09-29 NOTE — PROGRESS NOTES
Interventional Radiology      Patient seen in follow up for  POD1 s/p mesenteric varix embolization. AVSS    Patient can go home today. I will see patient in 3 weeks in my office. D/w patient's nurse. Primary team is aware.       Thank you

## 2017-09-29 NOTE — PROGRESS NOTES
Care Management Interventions  PCP Verified by CM: Yes ([facility])  Palliative Care Criteria Met (RRAT>21 & CHF Dx)?: No  Mode of Transport at Discharge: Self  Transition of Care Consult (CM Consult): Discharge Planning  MyChart Signup: No  Discharge Durable Medical Equipment: No  Health Maintenance Reviewed: Yes  Physical Therapy Consult: No  Occupational Therapy Consult: No  Speech Therapy Consult: No  Current Support Network: Lives with Spouse  Confirm Follow Up Transport: Self  Plan discussed with Pt/Family/Caregiver: Yes  Discharge Location  Discharge Placement: Home     Patient was provided with OBSERVATION letter, she signed it, original placed to blue paper chart, copy to patient. DCP-home with family support. No cm needs identified.

## 2017-09-29 NOTE — H&P
History & Physical    Patient: Dena Duong MRN: [id number]  CSN: [id number]    YOB: 1980  Age: 40 y.o. Sex: female      DOA: 9/28/2017    Chief Complaint: No chief complaint on file. HPI:     Dena Duong is a 40 y.o.  female has h/o chronic abd pain presented for elective IR portal vein venogram with embolization of varices. Pt tolerated procedure without complications. I was asked to admit Pt under observation to ensure no bleeding overnight. Pt was able to provide h/o abd pain since March 2017. She is hopeful that this procedure will end this vicious pain cycle. Pt was observed eating. She stated her pain remains now on both sides of abd but is controlled with narcotics. Past Medical History:   Diagnosis Date    Abdominal pain     Hypotension        Past Surgical History:   Procedure Laterality Date    BREAST SURGERY PROCEDURE UNLISTED      breast reduction    HX BREAST BIOPSY      HX CHOLECYSTECTOMY      HX CYSTECTOMY      HX GASTRIC BYPASS      HX GI      HX GYN      HX ORTHOPAEDIC      left knee       No family history on file. Social History     Social History    Marital status:      Spouse name: N/A    Number of children: N/A    Years of education: N/A     Social History Main Topics    Smoking status: Never Smoker    Smokeless tobacco: Never Used    Alcohol use Yes    Drug use: No    Sexual activity: Not on file     Other Topics Concern    Not on file     Social History Narrative       Prior to Admission medications    Medication Sig Start Date End Date Taking? Authorizing Provider   ondansetron (ZOFRAN ODT) 4 mg disintegrating tablet Take 1 Tab by mouth every eight (8) hours as needed for Nausea. 9/1/17  Yes KIM Duckworth   topiramate (TOPAMAX) 100 mg tablet Take  by mouth two (2) times a day. Yes Historical Provider   rivaroxaban (XARELTO) 20 mg tab tablet Take  by mouth daily.    Yes Historical Provider   dicyclomine (BENTYL) 10 mg capsule Take 10 mg by mouth 4 times daily (before meals and nightly). Yes Historical Provider   SUMATRIPTAN SUCCINATE (IMITREX SC) by SubCUTAneous route. Yes Historical Provider   midodrine (PROAMITINE) 5 mg tablet Take  by mouth three (3) times daily. Yes Historical Provider       No Known Allergies      Review of Systems  GENERAL: Patient alert, awake and oriented times 3, able to communicate full sentences and not in distress. HEENT: No change in vision, no earache, tinnitus, sore throat or sinus congestion. NECK: No pain or stiffness. PULMONARY: No shortness of breath, cough or wheeze. Cardiovascular: no pnd or orthopnea, no CP  GASTROINTESTINAL: + abdominal pain,no nausea, vomiting or diarrhea, melena or bright red blood per rectum. GENITOURINARY: No urinary frequency, urgency, hesitancy or dysuria. MUSCULOSKELETAL: No joint or muscle pain, no back pain, no recent trauma. DERMATOLOGIC: No rash, no itching, no lesions. ENDOCRINE: No polyuria, polydipsia, no heat or cold intolerance. No recent change in weight. HEMATOLOGICAL: No anemia or easy bruising or bleeding. NEUROLOGIC: No headache, seizures, numbness, tingling or weakness. Physical Exam:     Physical Exam:  Visit Vitals    /87    Pulse 89    Temp 97.9 °F (36.6 °C)    Resp 16    Ht 5' 6\" (1.676 m)    Wt 65.8 kg (145 lb)    SpO2 99%    Breastfeeding No    BMI 23.4 kg/m2    O2 Flow Rate (L/min): 3 l/min O2 Device: Room air    Temp (24hrs), Av.2 °F (36.8 °C), Min:97.8 °F (36.6 °C), Max:98.6 °F (37 °C)         0701 -  1900  In: -   Out: 900 [Urine:900]    General:  Alert, cooperative, no distress, appears stated age. Head: Normocephalic, without obvious abnormality, atraumatic. Eyes:  Conjunctivae/corneas clear. PERRL, EOMs intact. Nose: Nares normal. No drainage or sinus tenderness.    Neck: Supple, symmetrical, trachea midline, no adenopathy, thyroid: no enlargement, no carotid bruit and no JVD. Lungs:   Clear to auscultation bilaterally. Heart:  Regular rate and rhythm, S1, S2 normal.     Abdomen: Soft, tender. R flank dressing with blood stained. Bowel sounds normal.    Extremities: Extremities normal, atraumatic, no cyanosis or edema. Pulses: 2+ and symmetric all extremities. Skin:  No rashes or lesions   Neurologic: AAOx3, No focal motor or sensory deficit. Labs Reviewed:    Lab results reviewed. For significant abnormal values and values requiring intervention, see assessment and plan. 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 Pt has tolerated po intake. She was on strict bedrest for 4 hours which should end soon. Encourage mvmt. Resumed outPt medication. Ordered CBC, BMP for am. Anticipate d/c home if stable. DVT/GI Prophylaxis: SCD's and H2B/PPI    Discussed with patient at bedside about hospital admission and my plan care, she understood and agree with my plan care.     Ema Viera MD  9/28/2017 9:19 PM

## 2017-09-29 NOTE — DISCHARGE SUMMARY
Discharge Summary     Patient ID:  Albin Corrales  [id number]  40 y.o.  1980  Body mass index is 23.4 kg/(m^2). PCP on record: None    Admit date: 9/28/2017  Discharge date and time: 9/29/2017    Discharge Diagnoses:                                           1 Abdominal pain   2 Iron deficiency anemia   3 On LTAC - Xarelto   4 S/p Image guided mesenteric varix embolization. - by IR       Consults: [address] by problems:  Patient admitted for IR procedure , kept for observation . Post procedure doing well still had RUQ discomfort , procedure site with out complication . Resume all meds   Continue follow up with PCP and IR         Patient seen and examined by me on discharge day.   Pertinent Findings:  Patient is Alert Awake and oriented   Offers no complaints   Vitals stable   RS - Clear , no rales no rhonchi   CVS - regular   abd - benign  EXT - no edema , no calf tenderness     Significant Diagnostic Studies:  RADIOLOGY POST PROCEDURE NOTE      September 28, 2017       3:37 PM      Preoperative Diagnosis:  Mesenteric varix.     Postoperative Diagnosis:  Same.     :  Dr. Dietz Potsdam     Assistant:  None.     Type of Anesthesia: 1% plain lidocaine and GETA.     Procedure/Description:  Image guided mesenteric varix embolization.     Findings:   No bleeding.     Estimated blood Loss:  Minimal     Specimen Removed:   no     Blood transfusions:  None.     Implants:  See above.     Complications: None     Condition: Stable     Discharge Plan:  continue present therapy         Pertinent Lab Data:  Recent Labs      09/29/17   0100  09/28/17   1120   WBC  11.3  9.2   HGB  8.7*  9.1*   HCT  29.1*  29.9*   PLT  386  409     Recent Labs      09/29/17   0100  09/28/17   1120   NA  141  142   K  3.5  3.5   CL  108  108   CO2  26  28   GLU  112*  89   BUN  4*  7   CREA  0.73  0.74   CA  8.6  8.7   ALB   --   3.8   SGOT   --   23   ALT   --   25   INR   --   1.3*       DISCHARGE MEDICATIONS:   @  Current Discharge Medication List      START taking these medications    Details   oxyCODONE-acetaminophen (PERCOCET 10)  mg per tablet Take 1 Tab by mouth every eight (8) hours as needed. Max Daily Amount: 3 Tabs. Qty: 10 Tab, Refills: 0         CONTINUE these medications which have CHANGED    Details   rivaroxaban (XARELTO) 20 mg tab tablet Take 1 Tab by mouth daily. Start from 2017  Qty: 1 Tab, Refills: 0         CONTINUE these medications which have NOT CHANGED    Details   ondansetron (ZOFRAN ODT) 4 mg disintegrating tablet Take 1 Tab by mouth every eight (8) hours as needed for Nausea. Qty: 12 Tab, Refills: 0      topiramate (TOPAMAX) 100 mg tablet Take  by mouth two (2) times a day. dicyclomine (BENTYL) 10 mg capsule Take 10 mg by mouth 4 times daily (before meals and nightly). SUMATRIPTAN SUCCINATE (IMITREX SC) by SubCUTAneous route.      midodrine (PROAMITINE) 5 mg tablet Take  by mouth three (3) times daily. My Recommended Diet, Activity, Wound Care, and follow-up labs are listed in the patient's Discharge Insturctions which I have personally completed and reviewed. Disposition:     [x] Home with family     [] [facility] [place] PT/RN   [] SNF/NH   [] Inpatient Rehab/KATRIN  Condition at Discharge:  Stable    Follow up with:   PCP : None      Please follow-up tests/labs that are still pendin.  None  2.    >30 minutes spent coordinating this discharge (review instructions/follow-up, prescriptions, preparing report for sign off)    Signed:  Levon Couch MD  2017  11:32 AM

## 2017-09-29 NOTE — DISCHARGE INSTRUCTIONS
Abdominal Pain: Care Instructions  Your Care Instructions    Abdominal pain has many possible causes. Some aren't serious and get better on their own in a few days. Others need more testing and treatment. If your pain continues or gets worse, you need to be rechecked and may need more tests to find out what is wrong. You may need surgery to correct the problem. Don't ignore new symptoms, such as fever, nausea and vomiting, urination problems, pain that gets worse, and dizziness. These may be signs of a more serious problem. Your doctor may have recommended a follow-up visit in the next 8 to 12 hours. If you are not getting better, you may need more tests or treatment. The doctor has checked you carefully, but problems can develop later. If you notice any problems or new symptoms, get medical treatment right away. Follow-up care is a key part of your treatment and safety. Be sure to make and go to all appointments, and call your doctor if you are having problems. It's also a good idea to know your test results and keep a list of the medicines you take. How can you care for yourself at home? · Rest until you feel better. · To prevent dehydration, drink plenty of fluids, enough so that your urine is light yellow or clear like water. Choose water and other caffeine-free clear liquids until you feel better. If you have kidney, heart, or liver disease and have to limit fluids, talk with your doctor before you increase the amount of fluids you drink. · If your stomach is upset, eat mild foods, such as rice, dry toast or crackers, bananas, and applesauce. Try eating several small meals instead of two or three large ones. · Wait until 48 hours after all symptoms have gone away before you have spicy foods, alcohol, and drinks that contain caffeine. · Do not eat foods that are high in fat. · Avoid anti-inflammatory medicines such as aspirin, ibuprofen (Advil, Motrin), and naproxen (Aleve).  These can cause stomach upset. Talk to your doctor if you take daily aspirin for another health problem. When should you call for help? Call 911 anytime you think you may need emergency care. For example, call if:  · You passed out (lost consciousness). · You pass maroon or very bloody stools. · You vomit blood or what looks like coffee grounds. · You have new, severe belly pain. Call your doctor now or seek immediate medical care if:  · Your pain gets worse, especially if it becomes focused in one area of your belly. · You have a new or higher fever. · Your stools are black and look like tar, or they have streaks of blood. · You have unexpected vaginal bleeding. · You have symptoms of a urinary tract infection. These may include:  ¨ Pain when you urinate. ¨ Urinating more often than usual.  ¨ Blood in your urine. · You are dizzy or lightheaded, or you feel like you may faint. Watch closely for changes in your health, and be sure to contact your doctor if:  · You are not getting better after 1 day (24 hours). Where can you learn more? Go to http://radhaImmunity Projecthailey.info/. Enter B479 in the search box to learn more about \"Abdominal Pain: Care Instructions. \"  Current as of: March 20, 2017  Content Version: 11.3  © 1941-2681 [employer] Care instructions adapted under license by [employer] (which disclaims liability or warranty for this information). If you have questions about a medical condition or this instruction, always ask your healthcare professional. [place] [address] any warranty or liability for your use of this information. Learning About Opioids  Introduction  Opioids are medicines used to relieve moderate to severe pain. They may be used for a short time for pain, such as after surgery. Or they may be used for long-term pain. They don't cure a health problem. But they help you manage the pain.   Opioids relieve pain by changing the way your body feels pain and the way you feel about pain. Sometimes opioids are used for people who can't take other pain medicines. They may be prescribed if you have heart, kidney, or liver problems. For instance, you may take an opioid instead of nonsteroidal anti-inflammatory drugs (NSAIDs). NSAIDs include ibuprofen (Advil, Motrin) and naproxen (Aleve). Opioids are powerful medicines. You may need to take extra steps to stay safe. Examples  Opioids or other medicines that contain them include:  · Codeine (Tylenol 3). · Hydrocodone (Norco). · Oxycodone (OxyContin, Percocet). Safety tips  Taking too much (overdose) of an opioid can cause death. To avoid an overdose:  · Be safe with medicines. Take your medicines exactly as prescribed. Call your doctor if you think you are having a problem with your medicine. · Do not break, crush, or chew a pill. Do not cut or tear a patch. · Do not drink alcohol. Do not take recreational or illegal drugs. · Do not drive or operate machinery until the medicine effects are gone. Wait until you can think clearly. · Keep your medicine away from children and pets. Store it in a safe and secure place. · Call your doctor if you miss a dose of your medicine and aren't sure what to do. Do not double your dose. · Check with your doctor or pharmacist before you use any other medicines. This includes over-the-counter medicines. Make sure your doctor knows all of the medicines, vitamins, herbal products, and supplements you take. Taking some medicines together can cause problems. · Talk to your doctor about a naloxone rescue kit. A kit can help you, and even save your life, if you take too much of an opioid. Side effects  Common side effects include:  · Constipation. · Feeling dizzy or lightheaded. You may feel like you might faint. · Feeling sleepy. · Nausea or vomiting. You may have other side effects or reactions. Check the information that comes with your medicine.   What to know about taking this medicine  · Your body gets used to opioids if you take them all of the time. You could develop tolerance. This means you need more medicine to get the same pain relief. The danger is that tolerance greatly increases your risk of overdose, breathing emergencies, and death. You may also get dependent on the medicine, which can cause withdrawal symptoms when you stop taking them. Symptoms of withdrawal include nausea, sweating, chills, diarrhea, anxiety, and shaking. But you can avoid these symptoms if you slowly stop taking the medicine as your doctor tells you to. · There is a small risk of addiction when you take opioids. The risk is greater for those who have a history of substance use. Some people have more problems with opioids. Teenagers, older adults, people who have depression, people who have sleep apnea, and those who take high doses of medicine may have more problems with opioids. · Some opioids have acetaminophen in them. Check the labels on all the other medicines you take. This includes over-the-counter drugs. Many medicines have acetaminophen. Do not take others with acetaminophen in them unless your doctor has told you to. Taking too much acetaminophen can be harmful. Talk to your doctor or pharmacist if you have questions about this. · Be sure you know how to safely get rid of any leftover medicine. Talk to your doctor or pharmacist about how to do this. Ask for written instructions. When should you call for help? Call 911 anytime you think you may need emergency care. For example, call if:  · You have symptoms of a severe allergic reaction. These may include:  ¨ Sudden raised, red areas (hives) all over your body. ¨ Swelling of the throat, mouth, lips, or tongue. ¨ Trouble breathing. ¨ Passing out (losing consciousness). Or you may feel very lightheaded or suddenly feel weak, confused, or restless. · You have signs of an overdose.  These include:  ¨ Cold, clammy skin.  ¨ Confusion. ¨ Severe nervousness or restlessness. ¨ Severe dizziness, drowsiness, or weakness. ¨ Slow breathing. ¨ Seizures. Call your doctor now or seek immediate medical care if:  · You have symptoms of an allergic reaction, such as:  ¨ A rash or hives (raised, red areas on the skin). ¨ Itching. ¨ Swelling. ¨ Belly pain, nausea, or vomiting. Watch closely for changes in your health, and be sure to contact your doctor if:  · Your medicine is not helping with the pain. · You are having side effects, such as constipation. Where can you learn more? Go to http://radha-hailey.info/. Enter H597 in the search box to learn more about \"Learning About Opioids. \"  Current as of: February 7, 2017  Content Version: 11.3  © 2838-8664 [employer]. Care instructions adapted under license by [employer] (which disclaims liability or warranty for this information). If you have questions about a medical condition or this instruction, always ask your healthcare professional. [place] [address] any warranty or liability for your use of this information. Patient armband removed and shredded    MyChart Activation    Thank you for requesting access to [facility]. Please follow the instructions below to securely access and download your online medical record. [facility] allows you to send messages to your doctor, view your test results, renew your prescriptions, schedule appointments, and more. How Do I Sign Up? 1. In your internet browser, go to [email or web address] or Skip  2. Click on the First Time User? Click Here link in the Sign In box. You will be redirect to the New Member Sign Up page. 3. Enter your [facility] Access Code exactly as it appears below. You will not need to use this code after youve completed the sign-up process. If you do not sign up before the expiration date, you must request a new code.     [facility] Access Code: [email or web address]  Expires: 2017  1:33 PM (This is the date your [facility] access code will )    4. Enter the last four digits of your Social Security Number (xxxx) and Date of Birth (mm/dd/yyyy) as indicated and click Submit. You will be taken to the next sign-up page. 5. Create a [facility] ID. This will be your [facility] login ID and cannot be changed, so think of one that is secure and easy to remember. 6. Create a [facility] password. You can change your password at any time. 7. Enter your Password Reset Question and Answer. This can be used at a later time if you forget your password. 8. Enter your e-mail address. You will receive e-mail notification when new information is available in [address] 9. Click Sign Up. You can now view and download portions of your medical record. 10. Click the Download Summary menu link to download a portable copy of your medical information. Additional Information    If you have questions, please visit the Frequently Asked Questions section of the [employer] website at [email or web address] Remember, [facility] is NOT to be used for urgent needs. For medical emergencies, dial 911. DISCHARGE SUMMARY from Nurse    The following personal items are in your possession at time of discharge:       Visual Aid: None                            PATIENT INSTRUCTIONS:    After general anesthesia or intravenous sedation, for 24 hours or while taking prescription Narcotics:  · Limit your activities  · Do not drive and operate hazardous machinery  · Do not make important personal or business decisions  · Do  not drink alcoholic beverages  · If you have not urinated within 8 hours after discharge, please contact your surgeon on call.     Report the following to your surgeon:  · Excessive pain, swelling, redness or odor of or around the surgical area  · Temperature over 100.5  · Nausea and vomiting lasting longer than 4 hours or if unable to take medications  · Any signs of decreased circulation or nerve impairment to extremity: change in color, persistent  numbness, tingling, coldness or increase pain  · Any questions        What to do at Home:  Recommended activity: Activity as tolerated    If you experience any of the following symptoms Nausea, vomiting, diarrhea, fever greater than 100.5, dizziness, severe headache, shortness of breath, chest pain, increased pain, please follow up with PCP. *  Please give a list of your current medications to your Primary Care Provider. *  Please update this list whenever your medications are discontinued, doses are      changed, or new medications (including over-the-counter products) are added. *  Please carry medication information at all times in case of emergency situations. These are general instructions for a healthy lifestyle:    No smoking/ No tobacco products/ Avoid exposure to second hand smoke    Surgeon General's Warning:  Quitting smoking now greatly reduces serious risk to your health. Obesity, smoking, and sedentary lifestyle greatly increases your risk for illness    A healthy diet, regular physical exercise & weight monitoring are important for maintaining a healthy lifestyle    You may be retaining fluid if you have a history of heart failure or if you experience any of the following symptoms:  Weight gain of 3 pounds or more overnight or 5 pounds in a week, increased swelling in our hands or feet or shortness of breath while lying flat in bed. Please call your doctor as soon as you notice any of these symptoms; do not wait until your next office visit. Recognize signs and symptoms of STROKE:    F-face looks uneven    A-arms unable to move or move unevenly    S-speech slurred or non-existent    T-time-call 911 as soon as signs and symptoms begin-DO NOT go       Back to bed or wait to see if you get better-TIME IS BRAIN. Warning Signs of HEART ATTACK     Call 911 if you have these symptoms:   Chest discomfort.  Most heart attacks involve discomfort in the center of the chest that lasts more than a few minutes, or that goes away and comes back. It can feel like uncomfortable pressure, squeezing, fullness, or pain.  Discomfort in other areas of the upper body. Symptoms can include pain or discomfort in one or both arms, the back, neck, jaw, or stomach.  Shortness of breath with or without chest discomfort.  Other signs may include breaking out in a cold sweat, nausea, or lightheadedness. Don't wait more than five minutes to call 911 - MINUTES MATTER! Fast action can save your life. Calling 911 is almost always the fastest way to get lifesaving treatment. Emergency Medical Services staff can begin treatment when they arrive -- up to an hour sooner than if someone gets to the hospital by car. The discharge information has been reviewed with the patient. The patient verbalized understanding. Discharge medications reviewed with the patient and appropriate educational materials and side effects teaching were provided.

## 2017-09-29 NOTE — PROGRESS NOTES
Problem: Falls - Risk of  Goal: *Absence of Falls  Document Dipesh Fall Risk and appropriate interventions in the flowsheet.    Outcome: Progressing Towards Goal  Fall Risk Interventions:  Mobility Interventions: Patient to call before getting OOB                 Elimination Interventions: Call light in reach, Patient to call for help with toileting needs

## 2017-09-29 NOTE — PROGRESS NOTES
2043  MD paged to get an order for pain med. 2105  Paged MD again. Awaiting to call back. 2115  New order received for pain med. See MAR. Patient in stable condition. 2311  AOx4, NAD, stable, no s/s of bleeding. Will continue to monitor    0421  AOx4, NAD, stable, no s/s of bleeding.  Will continue to monitor

## 2017-09-29 NOTE — PROGRESS NOTES
Bedside shift change report given to Jim Carter LPN  (oncoming nurse) by Mil Garcia RN (offgoing nurse). Report included the following information SBAR, Kardex, ED Summary, Intake/Output and MAR.

## 2017-10-10 ENCOUNTER — OFFICE VISIT (OUTPATIENT)
Dept: HEMATOLOGY | Age: 37
End: 2017-10-10

## 2017-10-10 VITALS
OXYGEN SATURATION: 98 % | DIASTOLIC BLOOD PRESSURE: 70 MMHG | TEMPERATURE: 98.2 F | SYSTOLIC BLOOD PRESSURE: 106 MMHG | HEIGHT: 66 IN | WEIGHT: 141 LBS | BODY MASS INDEX: 22.66 KG/M2 | HEART RATE: 80 BPM | RESPIRATION RATE: 16 BRPM

## 2017-10-10 DIAGNOSIS — I86.8 MESENTERIC VARICES: Primary | ICD-10-CM

## 2017-10-10 DIAGNOSIS — R10.9 CHRONIC ABDOMINAL PAIN: ICD-10-CM

## 2017-10-10 DIAGNOSIS — G89.29 CHRONIC ABDOMINAL PAIN: ICD-10-CM

## 2017-10-10 NOTE — PROGRESS NOTES
[address] Gonzales MD, [place], [facility] Tamanna Carrillo, [place]       Jose Daniel Yost, AMY Agosto, [facility]   ELI Lin NP        at [address], [address] [address]     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  ICD-9-CM: 456.8  9/28/2017        H/O gastric bypass ICD-10-CM: Z98.890  ICD-9-CM: V45.86  7/15/2017    Overview Signed 7/15/2017  8:12 PM by Da Mercer MD     2008             Chronic abdominal pain ICD-10-CM: R10.9, G89.29  ICD-9-CM: 789.00, 338.29  7/15/2017        Migraine headache ICD-10-CM: F22.721  ICD-9-CM: 346.90  7/15/2017        Neurocardiogenic syncope ICD-10-CM: R55  ICD-9-CM: 780.2  7/15/2017        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 The active problem list, all pertinent past medical history, medications, endoscopic studies, radiologic findings   and laboratory findings related to the liver disorder were reviewed with the patient. The patient is a 40 y.o.  female who developed abdominal pain in about 3/2016. She underwent a gastric bypass in 2008. She lost about 100 pounds after the procedure. EGD was performed in 6/2016. This demonstrated no ulcer in the gastric pouch.     An MRI of the abdomen from 2015 demonstrated SMV thrombosis and a collateral shunt between the SMV and the Splenic vein. No evidence of PV thrombosis was seen. Repeat MRI in 6/2017 demonstrated patent SMV and a large collateral/shunt connecting the SMV and splenic vein. We had thought that this collateral could be causing vascular steal and contributing to the abdominal pain. She was evalauted by Dr Alexis Coronel and embolization of messenteric varices was performed in 9/2017. An assessment of liver fibrosis with biopsy or elastography has not been performed. The most recent laboratory studies indicate that the liver transaminases are normal, ALP is normal, tests of hepatic synthetic and metabolic function are normal, and the platelet count is normal.    The patient notes with embolization the pain is somewhat improved. The patient has severe limitations in functional activities secondary to abdominal pain. The patient has not experienced fevers, chills, nausea, vomiting,       ALLERGIES  No Known Allergies    MEDICATIONS  Current Outpatient Prescriptions   Medication Sig    rivaroxaban (XARELTO) 20 mg tab tablet Take 1 Tab by mouth daily. Start from 9/30/2017    oxyCODONE-acetaminophen (PERCOCET 10)  mg per tablet Take 1 Tab by mouth every eight (8) hours as needed. Max Daily Amount: 3 Tabs.  ondansetron (ZOFRAN ODT) 4 mg disintegrating tablet Take 1 Tab by mouth every eight (8) hours as needed for Nausea.  topiramate (TOPAMAX) 100 mg tablet Take  by mouth two (2) times a day.  dicyclomine (BENTYL) 10 mg capsule Take 10 mg by mouth 4 times daily (before meals and nightly).  SUMATRIPTAN SUCCINATE (IMITREX SC) by SubCUTAneous route.  midodrine (PROAMITINE) 5 mg tablet Take  by mouth three (3) times daily. No current facility-administered medications for this visit. SYSTEM REVIEW NOT RELATED TO LIVER DISEASE OR REVIEWED ABOVE:  Constitution systems: Negative for fever, chills, weight gain, weight loss.    Eyes: Negative for visual changes. ENT: Negative for sore throat, painful swallowing. Respiratory: Negative for cough, hemoptysis, SOB. Cardiology: Negative for chest pain, palpitations. GI:  Negative for constipation or diarrhea. : Negative for urinary frequency, dysuria, hematuria, nocturia. Skin: Negative for rash. Hematology: Negative for easy bruising, blood clots. Musculo-skelatal: Negative for back pain, muscle pain, weakness. Neurologic: Negative for headaches, dizziness, vertigo, memory problems not related to HE. Psychology: Negative for anxiety, depression. FAMILY HISTORY:  The father  of DM and MI. The mother  of heart disease and a CVA. There is no family history of liver disease. SOCIAL HISTORY:  The patient is . The patient has 2 children,   The patient has never used tobacco products. The patient has never consumed significant amounts of alcohol. The patient has not work. The patient is currently receiving disability. PHYSICAL EXAMINATION:  Visit Vitals    /70 (BP 1 Location: Left arm, BP Patient Position: Sitting)    Pulse 80    Temp 98.2 °F (36.8 °C) (Tympanic)    Resp 16    Ht 5' 6\" (1.676 m)    Wt 141 lb (64 kg)    SpO2 98%    BMI 22.76 kg/m2     General: No acute distress. Eyes: Sclera anicteric. ENT: No oral lesions. Thyroid normal.  Nodes: No adenopathy. Skin: No spider angiomata. No jaundice. No palmar erythema. Respiratory: Lungs clear to auscultation. Cardiovascular: Regular heart rate. No murmurs. No JVD. Abdomen: Soft non-tender. Liver size normal to percussion/palpation. Spleen not palpable. No obvious ascites. Extremities: No edema. No muscle wasting. No gross arthritic changes. Neurologic: Alert and oriented. Cranial nerves grossly intact. No asterixis.     LABORATORY STUDIES:  [facility] [address] [place] [address] & Units 2017   WBC 4.6 - 13.2 K/uL 8.6   ANC 1.8 - 8.0 K/UL 4.9   HGB 12.0 - 16.0 g/dL 10.4 (L)    - 420 K/uL 479 (H)   AST 15 - 37 U/L 30   ALT 13 - 56 U/L 26   Alk Phos 45 - 117 U/L 64   Bili, Total 0.2 - 1.0 MG/DL 0.2   Bili, Direct 0.0 - 0.2 MG/DL 0.1   Albumin 3.4 - 5.0 g/dL 4.1   BUN 7.0 - 18 MG/DL 8   Creat 0.6 - 1.3 MG/DL 0.85   Na 136 - 145 mmol/L 139   K 3.5 - 5.5 mmol/L 4.5   Cl 100 - 108 mmol/L 105   CO2 21 - 32 mmol/L 25   Glucose 74 - 99 mg/dL 87     SEROLOGIES:  Not available or performed. Testing was performed today. LIVER HISTOLOGY:  Not available or performed    ENDOSCOPIC PROCEDURES:  Not available or performed    RADIOLOGY:  2017. MRI of liver. Normal appearing liver. No liver mass lesions. Normal spleen. No dilated bile ducts. No bile duct strictures. No ascites. No evidence for SMV thrombosis. Collaterial between branch of SMV and splenic vein. OTHER TESTIN2017. Transhepatic mesenteric venogram.  Patent PV, SMV, IMV and splenic vein. No shunt between the SMV and SV was identified. Large mesenteric varix. Coil embolization of mesenteric varix. 10/2017. HIDA scan. Normal.    ASSESSMENT AND PLAN:  Persistent abdominal pain which appears to be improved following embolization of mesenteric varix. Unclear why she developed a mesenteric varix. Probably post-surgical inflammation following the gastric bypass. The previously seen shunt between the SMV and SV seen by MRI could not be identified by angiogram.  This was probably the SMV varix seen by angio. Will perform EGD to make sure the is no gastric cause of abdominal pain especially given that she had a gastric ulcer in the pouch in the past and her pain is now similar to this. .    Will perform HIDA to make sure there is no bile duct disease or retain gallstones. This was normal.     The lvier enzymes are normal.  Liver function is normal.  The platelet count is normal.      Based upon laboratory studies and imaging the patient does not appear to have liver disease.     The patient reports severe chronic abdominal pain which does not subside. There is no reason to perform liver biopsy at this time. The patient was directed to continue all current medications at the current dosages. There are no contraindications for the patient to take any medications that are necessary for treatment of other medical issues. The patient was counseled regarding alcohol consumption. The need for vaccination against viral hepatitis A and B will be assessed with serologic and instituted as appropriate. All of the above issues were discussed with the patient. All questions were answered. The patient expressed a clear understanding of the above. [facility] 87 in 3 months.       Larry Parra MD  [facility] [address], [address] [place] [address] - [address]  [phone]

## 2017-10-10 NOTE — MR AVS SNAPSHOT
Visit Information Date & Time Provider Department Dept. Phone Encounter #  
 10/10/2017  1:00 PM Iliana Cruz MD [facility] [id number] Your Appointments 1/22/2018  4:30 PM  
Follow Up with Iliana Cruz MD  
[id number] [facility] ([facility]) Appt Note: 3mnth f/up [facility], [address] [facility] [address]  
  
   
 [facility], [facility] Upcoming Health Maintenance Date Due DTaP/Tdap/Td series (1 - Tdap) 5/24/2002 PAP AKA CERVICAL CYTOLOGY 5/24/2002 INFLUENZA AGE 9 TO ADULT 8/1/2017 Allergies as of 10/10/2017  Review Complete On: 10/10/2017 By: Debbie Ramirez No Known Allergies Current Immunizations  Never Reviewed No immunizations on file. Not reviewed this visit Vitals BP Pulse Temp Resp Height(growth percentile) 106/70 (BP 1 Location: Left arm, BP Patient Position: Sitting) 80 98.2 °F (36.8 °C) (Tympanic) 16 5' 6\" (1.676 m) Weight(growth percentile) SpO2 BMI OB Status Smoking Status 141 lb (64 kg) 98% 22.76 kg/m2 IUD Never Smoker BMI and BSA Data Body Mass Index Body Surface Area  
 22.76 kg/m 2 1.73 m 2 Your Updated Medication List  
  
   
This list is accurate as of: 10/10/17  1:29 PM.  Always use your most recent med list.  
  
  
  
  
 BENTYL 10 mg capsule Generic drug:  dicyclomine Take 10 mg by mouth 4 times daily (before meals and nightly). IMITREX SC  
by SubCUTAneous route.  
  
 midodrine 5 mg tablet Commonly known as:  Kadi Salter Take  by mouth three (3) times daily. ondansetron 4 mg disintegrating tablet Commonly known as:  ZOFRAN ODT Take 1 Tab by mouth every eight (8) hours as needed for Nausea. oxyCODONE-acetaminophen  mg per tablet Commonly known as:  PERCOCET 10  
 Take 1 Tab by mouth every eight (8) hours as needed. Max Daily Amount: 3 Tabs. rivaroxaban 20 mg Tab tablet Commonly known as:  Adia Weiss Take 1 Tab by mouth daily. Start from 9/30/2017 TOPAMAX 100 mg tablet Generic drug:  topiramate Take  by mouth two (2) times a day. Introducing [facility] SERVICES! Selena Johnsoncampbell introduces [employer] patient portal. Now you can access parts of your medical record, email your doctor's office, and request medication refills online. 1. In your internet browser, go to [email or web address] 2. Click on the First Time User? Click Here link in the Sign In box. You will see the New Member Sign Up page. 3. Enter your [employer] Access Code exactly as it appears below. You will not need to use this code after youve completed the sign-up process. If you do not sign up before the expiration date, you must request a new code. · [employer] Access Code: [email or web address] Expires: 12/26/2017  1:33 PM 
 
4. Enter the last four digits of your Social Security Number (xxxx) and Date of Birth (mm/dd/yyyy) as indicated and click Submit. You will be taken to the next sign-up page. 5. Create a [employer] ID. This will be your [employer] login ID and cannot be changed, so think of one that is secure and easy to remember. 6. Create a [employer] password. You can change your password at any time. 7. Enter your Password Reset Question and Answer. This can be used at a later time if you forget your password. 8. Enter your e-mail address. You will receive e-mail notification when new information is available in [address] 9. Click Sign Up. You can now view and download portions of your medical record. 10. Click the Download Summary menu link to download a portable copy of your medical information. If you have questions, please visit the Frequently Asked Questions section of the [employer] website.  Remember, [employer] is NOT to be used for urgent needs. For medical emergencies, dial 911. Now available from your iPhone and Android! Please provide this summary of care documentation to your next provider. Your primary care clinician is listed as NONE. If you have any questions after today's visit, please call [phone].

## 2017-10-17 ENCOUNTER — HOSPITAL ENCOUNTER (OUTPATIENT)
Dept: NUCLEAR MEDICINE | Age: 37
Discharge: HOME | End: 2017-10-17
Attending: RADIOLOGY
Payer: OTHER GOVERNMENT

## 2017-10-17 VITALS — BODY MASS INDEX: 22.92 KG/M2 | WEIGHT: 142 LBS

## 2017-10-17 DIAGNOSIS — R10.9 ACUTE ABDOMINAL PAIN: ICD-10-CM

## 2017-10-17 PROCEDURE — 74011250636 HC RX REV CODE- 250/636: Performed by: RADIOLOGY

## 2017-10-17 PROCEDURE — 78227 HEPATOBIL SYST IMAGE W/DRUG: CPT

## 2017-10-17 PROCEDURE — 74011000250 HC RX REV CODE- 250: Performed by: RADIOLOGY

## 2017-10-17 RX ORDER — WATER FOR INJECTION,STERILE
VIAL (ML) INJECTION
Status: COMPLETED | OUTPATIENT
Start: 2017-10-17 | End: 2017-10-17

## 2017-10-17 RX ADMIN — SINCALIDE 1.29 MCG: 5 INJECTION, POWDER, LYOPHILIZED, FOR SOLUTION INTRAVENOUS at 09:19

## 2017-10-17 RX ADMIN — WATER 10 ML: 1 INJECTION INTRAMUSCULAR; INTRAVENOUS; SUBCUTANEOUS at 09:18

## 2017-10-30 ENCOUNTER — TELEPHONE (OUTPATIENT)
Dept: HEMATOLOGY | Age: 37
End: 2017-10-30

## 2017-10-30 NOTE — TELEPHONE ENCOUNTER
Pt. States on her last OV with Dr. Noe Perez 10/10/17 , they discussed her being referred out to [facility] but she has not heard anything else . Latrice Means  Please contact pt

## 2017-11-01 DIAGNOSIS — K74.69 OTHER CIRRHOSIS OF LIVER (HCC): Primary | ICD-10-CM

## 2018-01-02 ENCOUNTER — HOSPITAL ENCOUNTER (EMERGENCY)
Age: 38
Discharge: HOME | End: 2018-01-02
Attending: INTERNAL MEDICINE
Payer: OTHER GOVERNMENT

## 2018-01-02 ENCOUNTER — APPOINTMENT (OUTPATIENT)
Dept: CT IMAGING | Age: 38
End: 2018-01-02
Attending: INTERNAL MEDICINE
Payer: OTHER GOVERNMENT

## 2018-01-02 VITALS
OXYGEN SATURATION: 100 % | DIASTOLIC BLOOD PRESSURE: 80 MMHG | TEMPERATURE: 98.5 F | WEIGHT: 145 LBS | RESPIRATION RATE: 16 BRPM | SYSTOLIC BLOOD PRESSURE: 124 MMHG | BODY MASS INDEX: 23.3 KG/M2 | HEIGHT: 66 IN | HEART RATE: 81 BPM

## 2018-01-02 DIAGNOSIS — R10.9 ABDOMINAL PAIN, UNSPECIFIED ABDOMINAL LOCATION: ICD-10-CM

## 2018-01-02 DIAGNOSIS — N73.0 PID (ACUTE PELVIC INFLAMMATORY DISEASE): Primary | ICD-10-CM

## 2018-01-02 DIAGNOSIS — N89.8 VAGINAL DISCHARGE: ICD-10-CM

## 2018-01-02 LAB
ALBUMIN SERPL-MCNC: 4.2 G/DL (ref 3.4–5)
ALBUMIN/GLOB SERPL: 1.2 {RATIO} (ref 0.8–1.7)
ALP SERPL-CCNC: 57 U/L (ref 45–117)
ALT SERPL-CCNC: 44 U/L (ref 13–56)
ANION GAP SERPL CALC-SCNC: 10 MMOL/L (ref 3–18)
APPEARANCE UR: CLEAR
APTT PPP: 37.9 SEC (ref 23–36.4)
AST SERPL-CCNC: 36 U/L (ref 15–37)
BACTERIA URNS QL MICRO: ABNORMAL /HPF
BASOPHILS # BLD: 0 K/UL (ref 0–0.06)
BASOPHILS NFR BLD: 0 % (ref 0–2)
BILIRUB SERPL-MCNC: 0.3 MG/DL (ref 0.2–1)
BILIRUB UR QL: NEGATIVE
BUN SERPL-MCNC: 9 MG/DL (ref 7–18)
BUN/CREAT SERPL: 12 (ref 12–20)
CALCIUM SERPL-MCNC: 9.1 MG/DL (ref 8.5–10.1)
CHLORIDE SERPL-SCNC: 106 MMOL/L (ref 100–108)
CO2 SERPL-SCNC: 25 MMOL/L (ref 21–32)
COLOR UR: ABNORMAL
CREAT SERPL-MCNC: 0.73 MG/DL (ref 0.6–1.3)
DIFFERENTIAL METHOD BLD: ABNORMAL
EOSINOPHIL # BLD: 0.1 K/UL (ref 0–0.4)
EOSINOPHIL NFR BLD: 2 % (ref 0–5)
EPITH CASTS URNS QL MICRO: ABNORMAL /LPF (ref 0–5)
ERYTHROCYTE [DISTWIDTH] IN BLOOD BY AUTOMATED COUNT: 14.5 % (ref 11.6–14.5)
GLOBULIN SER CALC-MCNC: 3.6 G/DL (ref 2–4)
GLUCOSE SERPL-MCNC: 90 MG/DL (ref 74–99)
GLUCOSE UR STRIP.AUTO-MCNC: NEGATIVE MG/DL
HCG SERPL QL: NEGATIVE
HCT VFR BLD AUTO: 38.7 % (ref 35–45)
HGB BLD-MCNC: 12.8 G/DL (ref 12–16)
HGB UR QL STRIP: ABNORMAL
INR PPP: 1.5 (ref 0.8–1.2)
KETONES UR QL STRIP.AUTO: NEGATIVE MG/DL
LEUKOCYTE ESTERASE UR QL STRIP.AUTO: NEGATIVE
LIPASE SERPL-CCNC: 177 U/L (ref 73–393)
LYMPHOCYTES # BLD: 2.3 K/UL (ref 0.9–3.6)
LYMPHOCYTES NFR BLD: 28 % (ref 21–52)
MCH RBC QN AUTO: 31.5 PG (ref 24–34)
MCHC RBC AUTO-ENTMCNC: 33.1 G/DL (ref 31–37)
MCV RBC AUTO: 95.3 FL (ref 74–97)
MONOCYTES # BLD: 0.7 K/UL (ref 0.05–1.2)
MONOCYTES NFR BLD: 8 % (ref 3–10)
MUCOUS THREADS URNS QL MICRO: ABNORMAL /LPF
NEUTS SEG # BLD: 5.3 K/UL (ref 1.8–8)
NEUTS SEG NFR BLD: 62 % (ref 40–73)
NITRITE UR QL STRIP.AUTO: NEGATIVE
PH UR STRIP: 5 [PH] (ref 5–8)
PLATELET # BLD AUTO: 293 K/UL (ref 135–420)
PMV BLD AUTO: 9.2 FL (ref 9.2–11.8)
POTASSIUM SERPL-SCNC: 4 MMOL/L (ref 3.5–5.5)
PROT SERPL-MCNC: 7.8 G/DL (ref 6.4–8.2)
PROT UR STRIP-MCNC: NEGATIVE MG/DL
PROTHROMBIN TIME: 17.8 SEC (ref 11.5–15.2)
RBC # BLD AUTO: 4.06 M/UL (ref 4.2–5.3)
RBC #/AREA URNS HPF: ABNORMAL /HPF (ref 0–5)
SERVICE CMNT-IMP: NORMAL
SODIUM SERPL-SCNC: 141 MMOL/L (ref 136–145)
SP GR UR REFRACTOMETRY: 1.03 (ref 1–1.03)
UROBILINOGEN UR QL STRIP.AUTO: 0.2 EU/DL (ref 0.2–1)
WBC # BLD AUTO: 8.5 K/UL (ref 4.6–13.2)
WBC URNS QL MICRO: ABNORMAL /HPF (ref 0–5)
WET PREP GENITAL: NORMAL

## 2018-01-02 PROCEDURE — 80053 COMPREHEN METABOLIC PANEL: CPT | Performed by: INTERNAL MEDICINE

## 2018-01-02 PROCEDURE — 99285 EMERGENCY DEPT VISIT HI MDM: CPT

## 2018-01-02 PROCEDURE — 85025 COMPLETE CBC W/AUTO DIFF WBC: CPT | Performed by: INTERNAL MEDICINE

## 2018-01-02 PROCEDURE — 85610 PROTHROMBIN TIME: CPT | Performed by: INTERNAL MEDICINE

## 2018-01-02 PROCEDURE — 87491 CHLMYD TRACH DNA AMP PROBE: CPT | Performed by: INTERNAL MEDICINE

## 2018-01-02 PROCEDURE — 96375 TX/PRO/DX INJ NEW DRUG ADDON: CPT

## 2018-01-02 PROCEDURE — 81001 URINALYSIS AUTO W/SCOPE: CPT | Performed by: INTERNAL MEDICINE

## 2018-01-02 PROCEDURE — 84703 CHORIONIC GONADOTROPIN ASSAY: CPT | Performed by: INTERNAL MEDICINE

## 2018-01-02 PROCEDURE — 74011000250 HC RX REV CODE- 250: Performed by: INTERNAL MEDICINE

## 2018-01-02 PROCEDURE — 83690 ASSAY OF LIPASE: CPT | Performed by: INTERNAL MEDICINE

## 2018-01-02 PROCEDURE — 74177 CT ABD & PELVIS W/CONTRAST: CPT

## 2018-01-02 PROCEDURE — 74011250636 HC RX REV CODE- 250/636: Performed by: INTERNAL MEDICINE

## 2018-01-02 PROCEDURE — 96374 THER/PROPH/DIAG INJ IV PUSH: CPT

## 2018-01-02 PROCEDURE — 74011250637 HC RX REV CODE- 250/637: Performed by: INTERNAL MEDICINE

## 2018-01-02 PROCEDURE — 87210 SMEAR WET MOUNT SALINE/INK: CPT | Performed by: INTERNAL MEDICINE

## 2018-01-02 PROCEDURE — 85730 THROMBOPLASTIN TIME PARTIAL: CPT | Performed by: INTERNAL MEDICINE

## 2018-01-02 PROCEDURE — 74011636320 HC RX REV CODE- 636/320: Performed by: INTERNAL MEDICINE

## 2018-01-02 RX ORDER — HYDROCODONE BITARTRATE AND ACETAMINOPHEN 5; 325 MG/1; MG/1
1 TABLET ORAL
Status: COMPLETED | OUTPATIENT
Start: 2018-01-02 | End: 2018-01-02

## 2018-01-02 RX ORDER — DOXYCYCLINE 100 MG/1
100 CAPSULE ORAL 2 TIMES DAILY
Qty: 20 CAP | Refills: 0 | Status: SHIPPED | OUTPATIENT
Start: 2018-01-02 | End: 2018-01-12

## 2018-01-02 RX ORDER — MORPHINE SULFATE 2 MG/ML
2 INJECTION, SOLUTION INTRAMUSCULAR; INTRAVENOUS
Status: COMPLETED | OUTPATIENT
Start: 2018-01-02 | End: 2018-01-02

## 2018-01-02 RX ORDER — HYDROCODONE BITARTRATE AND ACETAMINOPHEN 5; 325 MG/1; MG/1
TABLET ORAL
Qty: 12 TAB | Refills: 0 | Status: ON HOLD | OUTPATIENT
Start: 2018-01-02 | End: 2018-01-10 | Stop reason: CLARIF

## 2018-01-02 RX ADMIN — MORPHINE SULFATE 2 MG: 2 INJECTION, SOLUTION INTRAMUSCULAR; INTRAVENOUS at 22:07

## 2018-01-02 RX ADMIN — IOPAMIDOL 100 ML: 612 INJECTION, SOLUTION INTRAVENOUS at 19:12

## 2018-01-02 RX ADMIN — WATER 1 G: 1 INJECTION INTRAMUSCULAR; INTRAVENOUS; SUBCUTANEOUS at 22:09

## 2018-01-02 RX ADMIN — HYDROCODONE BITARTRATE AND ACETAMINOPHEN 1 TABLET: 5; 325 TABLET ORAL at 19:32

## 2018-01-02 NOTE — ED NOTES
Pt resting on stretcher in room - states pain increased - requesting pain medication - no distress noted - family member at bedside with patient.

## 2018-01-02 NOTE — ED TRIAGE NOTES
Pt reports onset of nausea and diarrhea today. Pt reports that her last bowel movement had \"a lot of blood in the toilet. \" Pt takes Xarelto to prevent blood clots. Sepsis Screening completed    (  )Patient meets SIRS criteria. ( X )Patient does not meet SIRS criteria.       SIRS Criteria is achieved when two or more of the following are present   Temperature < 96.8°F (36°C) or > 100.9°F (38.3°C)   Heart Rate > 90 beats per minute   Respiratory Rate > 20 breaths per minute   WBC count > 12,000 or <4,000 or > 10% bands

## 2018-01-02 NOTE — ED PROVIDER NOTES
EMERGENCY DEPARTMENT HISTORY AND PHYSICAL EXAM    Date: 1/2/2018  Patient Name: Elijah Mar    History of Presenting Illness     Chief Complaint   Patient presents with    Abdominal Pain    Rectal Bleeding         History Provided By: Patient and Patient's Son    Chief Complaint: rectal bleeding   Duration: 6 hours   Timing:  Waxing and Waning  Location: LLQ abdominal pain with rectal bleeding  Quality: pulling  Severity: 9 out of 10  Associated Symptoms: diarrhea x 5 today, nausea, LLQ \"pulling\" abdominal pain, and diaphoresis. Additional History (Context):   5:18 PM   Elijah Mar is a 40 y.o. female with SHx cholecystectomy, emergent hernia repairs (2015), gastric bypass (2008), and PMHX of anemia, blood thinner use, previous mesenteric vein  blood clot, who presents to the emergency department C/O waxing and waning rectal bleeding onset 6 hours ago. Associated sxs include diarrhea x 5 today, nausea, LLQ \"pulling\" abdominal pain, and diaphoresis. The pt has a IUD placed, and is in the process of planning a hysterectomy. Pt denies drug use, syncope, fevers, chills, and any other sxs or complaints. PCP: None    Current Facility-Administered Medications   Medication Dose Route Frequency Provider Last Rate Last Dose    cefTRIAXone (ROCEPHIN) 1 g in sterile water (preservative free) 10 mL IV syringe  1 g IntraVENous NOW Staci Mcdonald MD        morphine injection 2 mg  2 mg IntraVENous NOW Satci Mcdonald MD         Current Outpatient Prescriptions   Medication Sig Dispense Refill    HYDROcodone-acetaminophen (NORCO) 5-325 mg per tablet Take 1-2 tablets PO every 4-6 hours as needed for pain control. If over the counter ibuprofen or acetaminophen was suggested, then only take the vicodin for pain not well controlled with the over the counter medication. 12 Tab 0    doxycycline (MONODOX) 100 mg capsule Take 1 Cap by mouth two (2) times a day for 10 days.  20 Cap 0    rivaroxaban (XARELTO) 20 mg tab tablet Take 1 Tab by mouth daily. Start from 9/30/2017 1 Tab 0    ondansetron (ZOFRAN ODT) 4 mg disintegrating tablet Take 1 Tab by mouth every eight (8) hours as needed for Nausea. 12 Tab 0    topiramate (TOPAMAX) 100 mg tablet Take  by mouth two (2) times a day.  dicyclomine (BENTYL) 10 mg capsule Take 10 mg by mouth 4 times daily (before meals and nightly).  midodrine (PROAMITINE) 5 mg tablet Take  by mouth three (3) times daily. Past History     Past Medical History:  Past Medical History:   Diagnosis Date    Abdominal pain     H/O blood clots     Hypotension        Past Surgical History:  Past Surgical History:   Procedure Laterality Date    BREAST SURGERY PROCEDURE UNLISTED      breast reduction    HX BREAST BIOPSY      HX CHOLECYSTECTOMY      HX CYSTECTOMY      HX GASTRIC BYPASS      HX GI      HX GYN      HX ORTHOPAEDIC      left knee       Family History:  History reviewed. No pertinent family history. Social History:  Social History   Substance Use Topics    Smoking status: Never Smoker    Smokeless tobacco: Never Used    Alcohol use Yes       Allergies:  No Known Allergies      Review of Systems   Review of Systems   Constitutional: Positive for diaphoresis. Negative for chills and fever. Gastrointestinal: Positive for abdominal pain, blood in stool, diarrhea and nausea. Negative for vomiting. All other systems reviewed and are negative. Physical Exam     Vitals:    01/02/18 1645 01/02/18 1839   BP: 123/79 109/69   Pulse: 88 87   Resp: 18 18   Temp: 98.5 °F (36.9 °C)    SpO2: 100% 100%   Weight: 65.8 kg (145 lb)    Height: 5' 6\" (1.676 m)      Physical Exam   Constitutional: She is oriented to person, place, and time. She appears well-developed and well-nourished. HENT:   Head: Normocephalic and atraumatic. Right Ear: External ear normal.   Left Ear: External ear normal.   Nose: Nose normal.   Mouth/Throat: Mucous membranes are dry.  Posterior oropharyngeal erythema (mild) present. No oropharyngeal exudate, posterior oropharyngeal edema or tonsillar abscesses. Eyes: Conjunctivae and EOM are normal. Pupils are equal, round, and reactive to light. Right eye exhibits no discharge. Left eye exhibits no discharge. No scleral icterus. Neck: Normal range of motion. Neck supple. No JVD present. No tracheal deviation present. Cardiovascular: Normal rate, regular rhythm, normal heart sounds and intact distal pulses. Pulses:       Radial pulses are 2+ on the right side, and 2+ on the left side. Posterior tibial pulses are 2+ on the right side, and 2+ on the left side. Pulmonary/Chest: Effort normal and breath sounds normal. She has no decreased breath sounds. She has no wheezes. She has no rhonchi. She has no rales. Abdominal: Soft. She exhibits no distension. Bowel sounds are increased. There is tenderness in the right lower quadrant and left lower quadrant. There is guarding (minimal to no). There is no rebound and no CVA tenderness. Multiple heal surgical scars   Musculoskeletal: Normal range of motion. She exhibits no edema. Neurological: She is alert and oriented to person, place, and time. She has normal reflexes. No focal motor weakness. Skin: Skin is warm and dry. No rash noted. Psychiatric: She has a normal mood and affect. Her behavior is normal.   Nursing note and vitals reviewed.         Diagnostic Study Results     Labs -     Recent Results (from the past 12 hour(s))   URINALYSIS W/ RFLX MICROSCOPIC    Collection Time: 01/02/18  5:00 PM   Result Value Ref Range    Color DARK YELLOW      Appearance CLEAR      Specific gravity 1.030 1.005 - 1.030      pH (UA) 5.0 5.0 - 8.0      Protein NEGATIVE  NEG mg/dL    Glucose NEGATIVE  NEG mg/dL    Ketone NEGATIVE  NEG mg/dL    Bilirubin NEGATIVE  NEG      Blood TRACE (A) NEG      Urobilinogen 0.2 0.2 - 1.0 EU/dL    Nitrites NEGATIVE  NEG      Leukocyte Esterase NEGATIVE  NEG     URINE MICROSCOPIC ONLY    Collection Time: 01/02/18  5:00 PM   Result Value Ref Range    WBC 0 to 3 0 - 5 /hpf    RBC 0 to 3 0 - 5 /hpf    Epithelial cells FEW 0 - 5 /lpf    Bacteria 1+ (A) NEG /hpf    Mucus FEW (A) NEG /lpf   CBC WITH AUTOMATED DIFF    Collection Time: 01/02/18  5:18 PM   Result Value Ref Range    WBC 8.5 4.6 - 13.2 K/uL    RBC 4.06 (L) 4.20 - 5.30 M/uL    HGB 12.8 12.0 - 16.0 g/dL    HCT 38.7 35.0 - 45.0 %    MCV 95.3 74.0 - 97.0 FL    MCH 31.5 24.0 - 34.0 PG    MCHC 33.1 31.0 - 37.0 g/dL    RDW 14.5 11.6 - 14.5 %    PLATELET 015 551 - 029 K/uL    MPV 9.2 9.2 - 11.8 FL    NEUTROPHILS 62 40 - 73 %    LYMPHOCYTES 28 21 - 52 %    MONOCYTES 8 3 - 10 %    EOSINOPHILS 2 0 - 5 %    BASOPHILS 0 0 - 2 %    ABS. NEUTROPHILS 5.3 1.8 - 8.0 K/UL    ABS. LYMPHOCYTES 2.3 0.9 - 3.6 K/UL    ABS. MONOCYTES 0.7 0.05 - 1.2 K/UL    ABS. EOSINOPHILS 0.1 0.0 - 0.4 K/UL    ABS. BASOPHILS 0.0 0.0 - 0.06 K/UL    DF AUTOMATED     METABOLIC PANEL, COMPREHENSIVE    Collection Time: 01/02/18  5:18 PM   Result Value Ref Range    Sodium 141 136 - 145 mmol/L    Potassium 4.0 3.5 - 5.5 mmol/L    Chloride 106 100 - 108 mmol/L    CO2 25 21 - 32 mmol/L    Anion gap 10 3.0 - 18 mmol/L    Glucose 90 74 - 99 mg/dL    BUN 9 7.0 - 18 MG/DL    Creatinine 0.73 0.6 - 1.3 MG/DL    BUN/Creatinine ratio 12 12 - 20      GFR est AA >60 >60 ml/min/1.73m2    GFR est non-AA >60 >60 ml/min/1.73m2    Calcium 9.1 8.5 - 10.1 MG/DL    Bilirubin, total 0.3 0.2 - 1.0 MG/DL    ALT (SGPT) 44 13 - 56 U/L    AST (SGOT) 36 15 - 37 U/L    Alk.  phosphatase 57 45 - 117 U/L    Protein, total 7.8 6.4 - 8.2 g/dL    Albumin 4.2 3.4 - 5.0 g/dL    Globulin 3.6 2.0 - 4.0 g/dL    A-G Ratio 1.2 0.8 - 1.7     PTT    Collection Time: 01/02/18  5:18 PM   Result Value Ref Range    aPTT 37.9 (H) 23.0 - 36.4 SEC   PROTHROMBIN TIME + INR    Collection Time: 01/02/18  5:18 PM   Result Value Ref Range    Prothrombin time 17.8 (H) 11.5 - 15.2 sec    INR 1.5 (H) 0.8 - 1.2     LIPASE    Collection Time: 01/02/18  5:18 PM   Result Value Ref Range    Lipase 177 73 - 393 U/L   HCG QL SERUM    Collection Time: 01/02/18  5:18 PM   Result Value Ref Range    HCG, Ql. NEGATIVE  NEG     WET PREP    Collection Time: 01/02/18  5:35 PM   Result Value Ref Range    Special Requests: NO SPECIAL REQUESTS      Wet prep NO YEAST,TRICHOMONAS OR CLUE CELLS NOTED         Radiologic Studies -   CT ABD PELV W CONT   Final Result        CT Results  (Last 48 hours)               01/02/18 1930  CT ABD PELV W CONT Final result    Impression:  IMPRESSION:       1. Postoperative changes from gastric bypass surgery. Multiple coils in place. There is a prominent cervix which should be evaluated clinically especially   given the history of vaginal discharge. No acute process otherwise           Narrative:  EXAM: CT of the abdomen and pelvis       INDICATION: Abdominal pain, bloody diarrhea, vaginal discharge, history of   gastric bypass and mesenteric DVT       COMPARISON: September 1, 2017       TECHNIQUE: Axial CT imaging of the abdomen and pelvis was performed with   intravenous contrast. Multiplanar reformats were generated. DOSE REDUCTION:  One or more dose reduction techniques were used on this CT:   automated exposure control, adjustment of the mAs and/or kVp according to   patient's size, and iterative reconstruction techniques. The specific techniques   utilized on this CT exam have been documented in the patient's electronic   medical record.       _______________       FINDINGS:       LOWER CHEST: Unremarkable. LIVER, BILIARY: There are metallic coils in the liver casting beam hardening   artifacts limiting evaluation. There is mild prominence of the bile ducts   secondary to reservoir effect from prior cholecystectomy.  Cholecystectomy       PANCREAS: Normal.       SPLEEN: Normal.       ADRENALS: Normal.       KIDNEYS/URETERS: Normal.       VASCULATURE: Unremarkable       LYMPH NODES: No enlarged lymph nodes seen       GASTRO INTESTINAL TRACT: There are coils seen along the right side of the   abdomen casting beam hardening artifacts limiting evaluation. Appendix is   normal. No bowel obstruction seen. Moderate amount of stool present in the   colon. There are surgical clips in the left side of the abdomen. Post gastric   bypass changes are present. No bowel obstruction seen. No definite internal   hernia seen. PELVIC ORGANS/BLADDER: Uterus is retroverted. Urinary bladder appears   unremarkable. There is a prominent cervix which should be evaluated clinically. No free fluid. BONES: No acute or aggressive osseous abnormalities identified. OTHER: None.       _______________               CXR Results  (Last 48 hours)    None          Medications given in the ED-  Medications   cefTRIAXone (ROCEPHIN) 1 g in sterile water (preservative free) 10 mL IV syringe (not administered)   morphine injection 2 mg (not administered)   iopamidol (ISOVUE 300) 61 % contrast injection 100 mL (100 mL IntraVENous Given 1/2/18 1912)   HYDROcodone-acetaminophen (NORCO) 5-325 mg per tablet 1 Tab (1 Tab Oral Given 1/2/18 1932)         Medical Decision Making   I am the first provider for this patient. I reviewed the vital signs, available nursing notes, past medical history, past surgical history, family history and social history. Vital Signs-Reviewed the patient's vital signs. Pulse Oximetry Analysis - 100% on room air     Cardiac Monitor:  Rate: 88 bpm  Rhythm: NSR      Records Reviewed: Nursing Notes and Old Medical Records    Provider Notes (Medical Decision Making): DDx: colitis, myenteric ischemia, Hemorids, mass, partial obstruction,  pathology, UTI, other GI pathology, STD, rule out other GYN pathology. CT trasnmission down, so delayed reading, explained to the pt. Procedures:  Pelvic Exam  Date/Time: 1/2/2018 9:51 PM  Performed by: attending  Documented by:  Dai Gaytan.    Exam assisted by: Faby Hernandez. [facility] Type of exam performed: bimanual and speculum. External genitalia appearance: normal.    Vaginal exam:  discharge. The amount of discharge was:  moderate. The discharge was thick and white. Cervix assessment: cervical os erythemas. Specimen(s) collected:  chlamydia, GC and vaginal culture. Bimanual exam: CMT positive; quetionalble left adnexal.    Patient tolerance: Patient tolerated the procedure well with no immediate complications          ED Course:   5:18 PM    Initial assessment performed. The patients presenting problems have been discussed, and they are in agreement with the care plan formulated and outlined with them. I have encouraged them to ask questions as they arise throughout their visit. PROCEDURE NOTE - RECTAL EXAM:   5:28 PM  Performed by: Bettie Charles MD  Rectal exam performed. no stool was collected. Stool was Hemoccult tested, and found to be heme Negative. The procedure took 1-15 minutes, and pt tolerated well. Written by Audelia Forbes, ED Scribe, as dictated by Bettie Charles MD.    PROGRESS NOTE:   9:31 PM  Pt has been re-examined by Bettie Charles MD. Pt is still in pain; will give Morphine. 955 PM, pt pain improved, ready to go home, pt abd nabs, soft, nt, nd. Pt not toxic, not septic. Diagnosis and Disposition       DISCHARGE NOTE:  9:59 PM   David Coppola's  results have been reviewed with her. She has been counseled regarding her diagnosis, treatment, and plan. She verbally conveys understanding and agreement of the signs, symptoms, diagnosis, treatment and prognosis and additionally agrees to follow up as discussed. She also agrees with the care-plan and conveys that all of her questions have been answered.   I have also provided discharge instructions for her that include: educational information regarding their diagnosis and treatment, and list of reasons why they would want to return to the ED prior to their follow-up appointment, should her condition change. She has been provided with education for proper emergency department utilization. CLINICAL IMPRESSION:    1. PID (acute pelvic inflammatory disease)    2. Abdominal pain, unspecified abdominal location    3. Vaginal discharge        PLAN:  1. D/C Home  2. Current Discharge Medication List      START taking these medications    Details   HYDROcodone-acetaminophen (NORCO) 5-325 mg per tablet Take 1-2 tablets PO every 4-6 hours as needed for pain control. If over the counter ibuprofen or acetaminophen was suggested, then only take the vicodin for pain not well controlled with the over the counter medication. Qty: 12 Tab, Refills: 0    Associated Diagnoses: Abdominal pain, unspecified abdominal location; PID (acute pelvic inflammatory disease)      doxycycline (MONODOX) 100 mg capsule Take 1 Cap by mouth two (2) times a day for 10 days. Qty: 20 Cap, Refills: 0           3. Follow-up Information     Follow up With Details Comments Contact Info    Zachary Mcdonough MD Schedule an appointment as soon as possible for a visit in 2 days ED Follow-up with [facility] [address] [place] [address]      [facility] EMERGENCY DEPT Go to As needed, If symptoms worsen [address] [address]  [phone]        _______________________________    Attestations: This note is prepared by Roberto Olivares, acting as Scribe for Rustam Joel MD .    Rustam Joel MD:  The scribe's documentation has been prepared under my direction and personally reviewed by me in its entirety.   I confirm that the note above accurately reflects all work, treatment, procedures, and medical decision making performed by me.  _______________________________

## 2018-01-03 LAB
C TRACH RRNA SPEC QL NAA+PROBE: NEGATIVE
N GONORRHOEA RRNA SPEC QL NAA+PROBE: NEGATIVE
SPECIMEN SOURCE: NORMAL

## 2018-01-03 NOTE — ED NOTES
Assumed care for discharge only. I have reviewed discharge instructions with the patient. The patient verbalized understanding. Patient armband removed and shredded. 2 prescriptions provided. Patient discharged ambulatory with steady gait in no acute distress with family.

## 2018-01-03 NOTE — DISCHARGE INSTRUCTIONS
Abdominal Pain: Care Instructions  Your Care Instructions    Abdominal pain has many possible causes. Some aren't serious and get better on their own in a few days. Others need more testing and treatment. If your pain continues or gets worse, you need to be rechecked and may need more tests to find out what is wrong. You may need surgery to correct the problem. Don't ignore new symptoms, such as fever, nausea and vomiting, urination problems, pain that gets worse, and dizziness. These may be signs of a more serious problem. Your doctor may have recommended a follow-up visit in the next 8 to 12 hours. If you are not getting better, you may need more tests or treatment. The doctor has checked you carefully, but problems can develop later. If you notice any problems or new symptoms, get medical treatment right away. Follow-up care is a key part of your treatment and safety. Be sure to make and go to all appointments, and call your doctor if you are having problems. It's also a good idea to know your test results and keep a list of the medicines you take. How can you care for yourself at home? · Rest until you feel better. · To prevent dehydration, drink plenty of fluids, enough so that your urine is light yellow or clear like water. Choose water and other caffeine-free clear liquids until you feel better. If you have kidney, heart, or liver disease and have to limit fluids, talk with your doctor before you increase the amount of fluids you drink. · If your stomach is upset, eat mild foods, such as rice, dry toast or crackers, bananas, and applesauce. Try eating several small meals instead of two or three large ones. · Wait until 48 hours after all symptoms have gone away before you have spicy foods, alcohol, and drinks that contain caffeine. · Do not eat foods that are high in fat. · Avoid anti-inflammatory medicines such as aspirin, ibuprofen (Advil, Motrin), and naproxen (Aleve).  These can cause stomach upset. Talk to your doctor if you take daily aspirin for another health problem. When should you call for help? Call 911 anytime you think you may need emergency care. For example, call if:  ? · You passed out (lost consciousness). ? · You pass maroon or very bloody stools. ? · You vomit blood or what looks like coffee grounds. ? · You have new, severe belly pain. ?Call your doctor now or seek immediate medical care if:  ? · Your pain gets worse, especially if it becomes focused in one area of your belly. ? · You have a new or higher fever. ? · Your stools are black and look like tar, or they have streaks of blood. ? · You have unexpected vaginal bleeding. ? · You have symptoms of a urinary tract infection. These may include:  ¨ Pain when you urinate. ¨ Urinating more often than usual.  ¨ Blood in your urine. ? · You are dizzy or lightheaded, or you feel like you may faint. ? Watch closely for changes in your health, and be sure to contact your doctor if:  ? · You are not getting better after 1 day (24 hours). Where can you learn more? Go to http://radha-hailey.info/. Enter L957 in the search box to learn more about \"Abdominal Pain: Care Instructions. \"  Current as of: March 20, 2017  Content Version: 11.4  © 4569-7194 [employer]. Care instructions adapted under license by [employer] (which disclaims liability or warranty for this information). If you have questions about a medical condition or this instruction, always ask your healthcare professional. [place] [address] any warranty or liability for your use of this information.

## 2018-01-03 NOTE — ED NOTES
Report given to Regency Meridian, RN - all questions answered at this time - care transferred at this time.

## 2018-01-10 ENCOUNTER — HOSPITAL ENCOUNTER (OUTPATIENT)
Age: 38
Setting detail: OUTPATIENT SURGERY
Discharge: HOME | End: 2018-01-10
Attending: INTERNAL MEDICINE | Admitting: INTERNAL MEDICINE
Payer: OTHER GOVERNMENT

## 2018-01-10 VITALS
WEIGHT: 148 LBS | BODY MASS INDEX: 23.78 KG/M2 | HEART RATE: 69 BPM | TEMPERATURE: 96.3 F | RESPIRATION RATE: 14 BRPM | DIASTOLIC BLOOD PRESSURE: 83 MMHG | SYSTOLIC BLOOD PRESSURE: 115 MMHG | HEIGHT: 66 IN | OXYGEN SATURATION: 100 %

## 2018-01-10 LAB — HCG UR QL: NEGATIVE

## 2018-01-10 PROCEDURE — 74011000250 HC RX REV CODE- 250: Performed by: INTERNAL MEDICINE

## 2018-01-10 PROCEDURE — 74011250636 HC RX REV CODE- 250/636

## 2018-01-10 PROCEDURE — 81025 URINE PREGNANCY TEST: CPT

## 2018-01-10 PROCEDURE — 76040000019: Performed by: INTERNAL MEDICINE

## 2018-01-10 PROCEDURE — 74011250636 HC RX REV CODE- 250/636: Performed by: INTERNAL MEDICINE

## 2018-01-10 RX ORDER — SODIUM CHLORIDE 0.9 % (FLUSH) 0.9 %
5-10 SYRINGE (ML) INJECTION AS NEEDED
Status: CANCELLED | OUTPATIENT
Start: 2018-01-10 | End: 2018-01-10

## 2018-01-10 RX ORDER — DEXTROMETHORPHAN/PSEUDOEPHED 2.5-7.5/.8
1.2 DROPS ORAL
Status: CANCELLED | OUTPATIENT
Start: 2018-01-10

## 2018-01-10 RX ORDER — SODIUM CHLORIDE 9 MG/ML
100 INJECTION, SOLUTION INTRAVENOUS CONTINUOUS
Status: DISPENSED | OUTPATIENT
Start: 2018-01-10 | End: 2018-01-10

## 2018-01-10 RX ORDER — MIDAZOLAM HYDROCHLORIDE 1 MG/ML
.25-5 INJECTION, SOLUTION INTRAMUSCULAR; INTRAVENOUS
Status: DISCONTINUED | OUTPATIENT
Start: 2018-01-10 | End: 2018-01-10 | Stop reason: HOSPADM

## 2018-01-10 RX ORDER — FENTANYL CITRATE 50 UG/ML
100 INJECTION, SOLUTION INTRAMUSCULAR; INTRAVENOUS
Status: DISCONTINUED | OUTPATIENT
Start: 2018-01-10 | End: 2018-01-10 | Stop reason: HOSPADM

## 2018-01-10 RX ORDER — ATROPINE SULFATE 0.1 MG/ML
0.5 INJECTION INTRAVENOUS
Status: CANCELLED | OUTPATIENT
Start: 2018-01-10 | End: 2018-01-10

## 2018-01-10 RX ORDER — SODIUM CHLORIDE 0.9 % (FLUSH) 0.9 %
5-10 SYRINGE (ML) INJECTION EVERY 8 HOURS
Status: CANCELLED | OUTPATIENT
Start: 2018-01-10 | End: 2018-01-10

## 2018-01-10 RX ORDER — NALOXONE HYDROCHLORIDE 0.4 MG/ML
0.4 INJECTION, SOLUTION INTRAMUSCULAR; INTRAVENOUS; SUBCUTANEOUS
Status: DISCONTINUED | OUTPATIENT
Start: 2018-01-10 | End: 2018-01-10 | Stop reason: HOSPADM

## 2018-01-10 RX ORDER — FLUMAZENIL 0.1 MG/ML
0.2 INJECTION INTRAVENOUS
Status: DISCONTINUED | OUTPATIENT
Start: 2018-01-10 | End: 2018-01-10 | Stop reason: HOSPADM

## 2018-01-10 RX ORDER — EPINEPHRINE 0.1 MG/ML
1 INJECTION INTRACARDIAC; INTRAVENOUS
Status: CANCELLED | OUTPATIENT
Start: 2018-01-10 | End: 2018-01-10

## 2018-01-10 RX ADMIN — SODIUM CHLORIDE 100 ML/HR: 900 INJECTION, SOLUTION INTRAVENOUS at 08:14

## 2018-01-10 NOTE — PROCEDURES
[address] [place], [address], [facility] [place]       ELI Otto, AMY Morrison, [facility]   ELI Hawley NP        [address], [address] [address]     [phone]     FAX: [phone]    at [address], [address]     [phone]     FAX: [phone]       UPPER ENDOSCOPY PROCEDURE NOTE    Massiel Becerrilrosario  1980    INDICATION: Cirrhosis. Screening for esophageal varices with variceal ligation if  indicated. : Chau Soriano MD    ANESTHESIA/SEDATION: Versed 5 mg and Fentanyl 100 mcg      PROCEDURE DESCRIPTION:  [employer] consent was obtained from the patient for the procedure. All risks and benefits of the procedure explained. The patient was taken to the endoscopy suite and placed in the left lateral decubitus position. Following sequential administration of sedation to doses as indicated above the endoscope was inserted into the mouth and advanced under direct vision to the second portion of the duodenum. Careful inspection of upper gastrointestinal tract was made as the endoscope was inserted and withdrawn. Retroflexion of the endoscope to view of the cardia of the stomach was performed. The findings and interventions are described below. FINDINGS:  Esophagus:    Normal.      Stomach:   Small gastic pound from gastric bypass. At [address] there was a hemocystic spot without active bleeding. It did not wash off. No mass or obvious gastritis at this location. Duodenum:   Normal bulb and second portion    INTERVENTION:   None    COMPLICATIONS: None. The patient tolerated the procedure well. EBL: Negligible. RECOMMENDATIONS:  Observe until discharge parameters are achieved. May resume previous diet.   Follow-up GI clinic at [facility].      Monalisa Campbell MD  1/10/2018  9:02 AM

## 2018-01-10 NOTE — DISCHARGE INSTRUCTIONS
DISCHARGE SUMMARY from Nurse    PATIENT INSTRUCTIONS:    After general anesthesia or intravenous sedation, for 24 hours or while taking prescription Narcotics:  · Limit your activities  · Do not drive and operate hazardous machinery  · Do not make important personal or business decisions  · Do  not drink alcoholic beverages  · If you have not urinated within 8 hours after discharge, please contact your surgeon on call. Report the following to your surgeon:  · Excessive pain, swelling, redness or odor of or around the surgical area  · Temperature over 100.5  · Nausea and vomiting lasting longer than 4 hours or if unable to take medications  · Any signs of decreased circulation or nerve impairment to extremity: change in color, persistent  numbness, tingling, coldness or increase pain  · Any questions    What to do at Home:  Recommended activity: as above      If you experience any of the following symptoms as above, please follow up with as above. *  Please give a list of your current medications to your Primary Care Provider. *  Please update this list whenever your medications are discontinued, doses are      changed, or new medications (including over-the-counter products) are added. *  Please carry medication information at all times in case of emergency situations. These are general instructions for a healthy lifestyle:    No smoking/ No tobacco products/ Avoid exposure to second hand smoke  Surgeon General's Warning:  Quitting smoking now greatly reduces serious risk to your health.     Obesity, smoking, and sedentary lifestyle greatly increases your risk for illness    A healthy diet, regular physical exercise & weight monitoring are important for maintaining a healthy lifestyle    You may be retaining fluid if you have a history of heart failure or if you experience any of the following symptoms:  Weight gain of 3 pounds or more overnight or 5 pounds in a week, increased swelling in our hands or feet or shortness of breath while lying flat in bed. Please call your doctor as soon as you notice any of these symptoms; do not wait until your next office visit. Recognize signs and symptoms of STROKE:    F-face looks uneven    A-arms unable to move or move unevenly    S-speech slurred or non-existent    T-time-call 911 as soon as signs and symptoms begin-DO NOT go       Back to bed or wait to see if you get better-TIME IS BRAIN. Warning Signs of HEART ATTACK     Call 911 if you have these symptoms:   Chest discomfort. Most heart attacks involve discomfort in the center of the chest that lasts more than a few minutes, or that goes away and comes back. It can feel like uncomfortable pressure, squeezing, fullness, or pain.  Discomfort in other areas of the upper body. Symptoms can include pain or discomfort in one or both arms, the back, neck, jaw, or stomach.  Shortness of breath with or without chest discomfort.  Other signs may include breaking out in a cold sweat, nausea, or lightheadedness. Don't wait more than five minutes to call 911 - MINUTES MATTER! Fast action can save your life. Calling 911 is almost always the fastest way to get lifesaving treatment. Emergency Medical Services staff can begin treatment when they arrive -- up to an hour sooner than if someone gets to the hospital by car. The discharge information has been reviewed with the patient and caregiver. The patient verbalized understanding. Discharge medications reviewed with the patient and caregiver and appropriate educational materials and side effects teaching were provided.     Patient armband removed and shredded    ___________________________________________________________________________________________________________________________________

## 2018-01-10 NOTE — IP AVS SNAPSHOT
Senora Coffee 
 
 
 [address] [facility] [address] 
[phone] Patient: Silvano Hamman MRN: [id number] KGE:[id number] About your hospitalization You were admitted on:  January 10, 2018 You last received care in the:  [facility] ENDOSCOPY You were discharged on:  January 10, 2018 Why you were hospitalized Your primary diagnosis was:  Not on File Follow-up Information Follow up With Details Comments Contact Info None   None (395) Patient stated that they have no PCP Your Scheduled Appointments Monday January 22, 2018  4:30 PM EST Follow Up with Ofelia Montoya MD  
[address] [address]  
[phone] Discharge Orders None A check chantale indicates which time of day the medication should be taken. My Medications CONTINUE taking these medications Instructions Each Dose to Equal  
 Morning Noon Evening Bedtime BENTYL 10 mg capsule Generic drug:  dicyclomine Your last dose was: Your next dose is: Take 10 mg by mouth 4 times daily (before meals and nightly). 10 mg  
    
   
   
   
  
 doxycycline 100 mg capsule Commonly known as:  Elrad Footman Your last dose was: Your next dose is: Take 1 Cap by mouth two (2) times a day for 10 days. 100 mg  
    
   
   
   
  
 midodrine 5 mg tablet Commonly known as:  Junious Vahe Your last dose was: Your next dose is: Take  by mouth three (3) times daily. ondansetron 4 mg disintegrating tablet Commonly known as:  ZOFRAN ODT Your last dose was: Your next dose is: Take 1 Tab by mouth every eight (8) hours as needed for Nausea. 4 mg  
    
   
   
   
  
 rivaroxaban 20 mg Tab tablet Commonly known as:  Abhay Leonard Your last dose was: Your next dose is: Take 1 Tab by mouth daily. Start from 9/30/2017  
 20 mg  
    
   
   
   
  
 TOPAMAX 100 mg tablet Generic drug:  topiramate Your last dose was: Your next dose is: Take  by mouth two (2) times a day. Discharge Instructions DISCHARGE SUMMARY from Nurse PATIENT INSTRUCTIONS: 
 
 
F-face looks uneven A-arms unable to move or move unevenly S-speech slurred or non-existent T-time-call 911 as soon as signs and symptoms begin-DO NOT go Back to bed or wait to see if you get better-TIME IS BRAIN. Warning Signs of HEART ATTACK Call 911 if you have these symptoms: 
? Chest discomfort. Most heart attacks involve discomfort in the center of the chest that lasts more than a few minutes, or that goes away and comes back. It can feel like uncomfortable pressure, squeezing, fullness, or pain. ? Discomfort in other areas of the upper body. Symptoms can include pain or discomfort in one or both arms, the back, neck, jaw, or stomach. ? Shortness of breath with or without chest discomfort. ? Other signs may include breaking out in a cold sweat, nausea, or lightheadedness. Don't wait more than five minutes to call [address]! Fast action can save your life. Calling 911 is almost always the fastest way to get lifesaving treatment. Emergency Medical Services staff can begin treatment when they arrive  up to an hour sooner than if someone gets to the hospital by car. The discharge information has been reviewed with the patient and caregiver. The patient verbalized understanding.  
Discharge medications reviewed with the patient and caregiver and appropriate educational materials and side effects teaching were provided. Patient armband removed and shredded 
 
___________________________________________________________________________________________________________________________________ Introducing [facility]! [facility] introduces [employer] patient portal. Now you can access parts of your medical record, email your doctor's office, and request medication refills online. 1. In your internet browser, go to [email or web address] 2. Click on the First Time User? Click Here link in the Sign In box. You will see the New Member Sign Up page. 3. Enter your [employer] Access Code exactly as it appears below. You will not need to use this code after youve completed the sign-up process. If you do not sign up before the expiration date, you must request a new code. · [employer] Access Code: [email or web address] Expires: 4/2/2018  9:54 PM 
 
4. Enter the last four digits of your Social Security Number (xxxx) and Date of Birth (mm/dd/yyyy) as indicated and click Submit. You will be taken to the next sign-up page. 5. Create a [email or web address] ID. This will be your [employer] login ID and cannot be changed, so think of one that is secure and easy to remember. 6. Create a [employer] password. You can change your password at any time. 7. Enter your Password Reset Question and Answer. This can be used at a later time if you forget your password. 8. Enter your e-mail address. You will receive e-mail notification when new information is available in [address] 9. Click Sign Up. You can now view and download portions of your medical record. 10. Click the Download Summary menu link to download a portable copy of your medical information. If you have questions, please visit the Frequently Asked Questions section of the [employer] website. Remember, [employer] is NOT to be used for urgent needs. For medical emergencies, dial 911. Now available from your iPhone and Android! Providers Seen During Your Hospitalization Provider Specialty Primary office phone Armani Najera MD Hepatology [phone] Your Primary Care Physician (PCP) Primary Care Physician Office Phone Office Fax NONE ** None ** ** None ** You are allergic to the following No active allergies Recent Documentation Height Weight BMI OB Status Smoking Status 1.676 m 67.1 kg 23.89 kg/m2 IUD Never Smoker Emergency Contacts Name Discharge Info Relation Home Work Mobile Jay Coppola DISCHARGE CAREGIVER [3] Spouse [3] [phone] Patient Belongings The following personal items are in your possession at time of discharge: 
  Dental Appliances: None Please provide this summary of care documentation to your next provider. Signatures-by signing, you are acknowledging that this After Visit Summary has been reviewed with you and you have received a copy. Patient Signature:  ____________________________________________________________ Date:  ____________________________________________________________  
  
[facility] Provider Signature:  ____________________________________________________________ Date:  ____________________________________________________________

## 2018-01-10 NOTE — IP AVS SNAPSHOT
Summary of Care Report The Summary of Care report has been created to help improve care coordination. Users with access to [facility] or [address] (Web-based application) may access additional patient information including the Discharge Summary. If you are not currently a [address] user and need more information, please call the number listed below in the [place] [address] section and ask to be connected with Medical Records. Facility Information Name Address Phone [address] [place], [place] [address] [phone] Patient Information Patient Name Sex  Jasmin Sepulveda ([id number]) Female 1980 Discharge Information Admitting Provider Service Area Unit Panchito Brennan MD  [facility] / [phone] Discharge Provider Discharge Date/Time Discharge Disposition Destination (none) 1/10/2018 Morning (Pending) AHR (none) Patient Language Language ENGLISH [13] Hospital Problems as of 1/10/2018  Reviewed: 2018 10:01 AM by Panchito Brennan MD  
 None Non-Hospital Problems as of 1/10/2018  Reviewed: 2018 10:01 AM by Panchito Brennan MD  
  
  
  
 Class Noted - Resolved Last Modified Active Problems H/O gastric bypass  7/15/2017 - Present 7/15/2017 by Panchito Brennan MD  
  Entered by Panchito Brennan MD  
  Overview Signed 7/15/2017  8:12 PM by Panchito Brennan MD  
   2008   Chronic abdominal pain  7/15/2017 - Present 2017 by Gordon Tesfaye MD  
  Entered by Panchito Brennan MD  
  Migraine headache  7/15/2017 - Present 7/15/2017 by Panchito Brennan MD  
  Entered by Panchito Brennan MD  
  Neurocardiogenic syncope  7/15/2017 - Present 2017 by Gordon Tesfaye MD  
  Entered by Panchito Brennan MD  
 Superior mesenteric vein thrombosis  7/15/2017 - Present 9/28/2017 by Zabrina Acuna MD  
  Entered by Chely Wasserman MD  
  H/O umbilical hernia repair  7/15/2017 - Present 7/15/2017 by Chely Wasserman MD  
  Entered by Chely Wasserman MD  
  Mesenteric varices  9/28/2017 - Present 9/28/2017 by Zabrina Acuna MD  
  Entered by Zabrina Acuna MD  
  
You are allergic to the following No active allergies Current Discharge Medication List  
  
CONTINUE these medications which have NOT CHANGED Dose & Instructions Dispensing Information Comments BENTYL 10 mg capsule Generic drug:  dicyclomine Dose:  10 mg Take 10 mg by mouth 4 times daily (before meals and nightly). Refills:  0  
   
 doxycycline 100 mg capsule Commonly known as:  Elnor Lord Dose:  100 mg Take 1 Cap by mouth two (2) times a day for 10 days. Quantity:  20 Cap Refills:  0  
   
 midodrine 5 mg tablet Commonly known as:  Rosary Bigness Take  by mouth three (3) times daily. Refills:  0  
   
 ondansetron 4 mg disintegrating tablet Commonly known as:  ZOFRAN ODT Dose:  4 mg Take 1 Tab by mouth every eight (8) hours as needed for Nausea. Quantity:  12 Tab Refills:  0  
   
 rivaroxaban 20 mg Tab tablet Commonly known as:  Richard Gilda Dose:  20 mg Take 1 Tab by mouth daily. Start from 9/30/2017 Quantity:  1 Tab Refills:  0  
   
 TOPAMAX 100 mg tablet Generic drug:  topiramate Take  by mouth two (2) times a day. Refills:  0 Surgery Information ID Date/Time Status Primary Surgeon All Procedures Location [id number] 1/10/2018 0830 Unposted Chely Wasserman MD EGD [facility] ENDOSCOPY Follow-up Information Follow up With Details Comments Contact Info None   None (395) Patient stated that they have no PCP Discharge Instructions DISCHARGE SUMMARY from Nurse PATIENT INSTRUCTIONS: 
 
 
F-face looks uneven A-arms unable to move or move unevenly S-speech slurred or non-existent T-time-call 911 as soon as signs and symptoms begin-DO NOT go Back to bed or wait to see if you get better-TIME IS BRAIN. Warning Signs of HEART ATTACK Call 911 if you have these symptoms: 
? Chest discomfort. Most heart attacks involve discomfort in the center of the chest that lasts more than a few minutes, or that goes away and comes back. It can feel like uncomfortable pressure, squeezing, fullness, or pain. ? Discomfort in other areas of the upper body. Symptoms can include pain or discomfort in one or both arms, the back, neck, jaw, or stomach. ? Shortness of breath with or without chest discomfort. ? Other signs may include breaking out in a cold sweat, nausea, or lightheadedness. Don't wait more than five minutes to call [address]! Fast action can save your life. Calling 911 is almost always the fastest way to get lifesaving treatment. Emergency Medical Services staff can begin treatment when they arrive  up to an hour sooner than if someone gets to the hospital by car. The discharge information has been reviewed with the patient and caregiver. The patient verbalized understanding. Discharge medications reviewed with the patient and caregiver and appropriate educational materials and side effects teaching were provided. Patient armband removed and shredded 
 
___________________________________________________________________________________________________________________________________ Chart Review Routing History No Routing History on File

## 2018-01-10 NOTE — H&P
[address] [place], [address], [place], [place]       ELI David PA-C Blondell Bowen, CINDY-BC   ELI Costa NP        at [address], [address] [address]     [phone]     FAX: [phone]    at [address], [address]     [phone]     FAX: [phone]       PRE-PROCEDURE NOTE - EGD    H and P from last office visit reviewed. Allergies reviewed. Out-patient medicaton list reviewed. Patient Active Problem List   Diagnosis Code    H/O gastric bypass Z98.890    Chronic abdominal pain R10.9, G89.29    Migraine headache G43.909    Neurocardiogenic syncope R55    Superior mesenteric vein thrombosis C48    H/O umbilical hernia repair E87.516, Z87.19    Mesenteric varices I86.8       No Known Allergies    No current facility-administered medications on file prior to encounter. Current Outpatient Prescriptions on File Prior to Encounter   Medication Sig Dispense Refill    topiramate (TOPAMAX) 100 mg tablet Take  by mouth two (2) times a day.  dicyclomine (BENTYL) 10 mg capsule Take 10 mg by mouth 4 times daily (before meals and nightly).  midodrine (PROAMITINE) 5 mg tablet Take  by mouth three (3) times daily.  rivaroxaban (XARELTO) 20 mg tab tablet Take 1 Tab by mouth daily. Start from 9/30/2017 1 Tab 0    ondansetron (ZOFRAN ODT) 4 mg disintegrating tablet Take 1 Tab by mouth every eight (8) hours as needed for Nausea. 12 Tab 0       For EGD to assess for esophageal and gastric varices. Plan to perform banding if indicated based upon variceal size and appearance. The risks of the procedure were discussed with the patient. These included reaction to anesthesia, pain, perforation and bleeding. All questions were answered.   The patient wishes to proceed with the procedure. PHYSICAL EXAMINATION:  VS: per nursing note  General: No acute distress. Eyes: Sclera anicteric. ENT: No oral lesions. Thyroid normal.  Nodes: No adenopathy. Skin: No spider angiomata. No jaundice. No palmar erythema. Respiratory: Lungs clear to auscultation. Cardiovascular: Regular heart rate. No murmurs. No JVD. Abdomen: Soft non-tender, liver size normal to percussion/palpation. Spleen not palpable. No obvious ascites. Extremities: No edema. No muscle wasting. No gross arthritic changes. Neurologic: Alert and oriented. Cranial nerves grossly intact. No asterixis. MOST RECENT LABORATORY STUDIES:  Lab Results   Component Value Date/Time    WBC 8.5 01/02/2018 05:18 PM    HGB 12.8 01/02/2018 05:18 PM    HCT 38.7 01/02/2018 05:18 PM    PLATELET 231 14/20/7707 05:18 PM    MCV 95.3 01/02/2018 05:18 PM     Lab Results   Component Value Date/Time    INR 1.5 01/02/2018 05:18 PM    INR 1.3 09/28/2017 11:20 AM    Prothrombin time 17.8 01/02/2018 05:18 PM    Prothrombin time 15.4 09/28/2017 11:20 AM       ASSESSMENT AND PLAN:  EGD to assess for esophageal and/or gastric varices. Conscious sedation with fentanyl and versed.     Monalisa Campbell MD  [facility] [place] [facility] [address] [place] [address], [address] [place] [address], [address] [place] [address] Street [address]  [phone]

## 2018-01-22 ENCOUNTER — OFFICE VISIT (OUTPATIENT)
Dept: HEMATOLOGY | Age: 38
End: 2018-01-22

## 2018-01-22 VITALS
RESPIRATION RATE: 16 BRPM | TEMPERATURE: 99.4 F | DIASTOLIC BLOOD PRESSURE: 79 MMHG | HEART RATE: 64 BPM | WEIGHT: 150 LBS | BODY MASS INDEX: 24.11 KG/M2 | OXYGEN SATURATION: 94 % | SYSTOLIC BLOOD PRESSURE: 111 MMHG | HEIGHT: 66 IN

## 2018-01-22 DIAGNOSIS — G89.29 CHRONIC ABDOMINAL PAIN: Primary | ICD-10-CM

## 2018-01-22 DIAGNOSIS — R10.9 CHRONIC ABDOMINAL PAIN: Primary | ICD-10-CM

## 2018-01-22 NOTE — PROGRESS NOTES
1. Have you been to the ER, urgent care clinic since your last visit? Hospitalized since your last visit? No    2. Have you seen or consulted any other health care providers outside of the [address] [place] [address] since your last visit? Include any pap smears or colon screening.  No

## 2018-01-22 NOTE — PROGRESS NOTES
[address] Taryn Gonzales MD, [address], [place], [place]       Virgil Wells, NP Mackie Dupont, PA-C Karyle Dupre, [facility]   LEI Slater NP        at [address], [address] [address]     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  ICD-9-CM: 456.8  9/28/2017        H/O gastric bypass ICD-10-CM: Z98.890  ICD-9-CM: V45.86  7/15/2017    Overview Signed 7/15/2017  8:12 PM by Tabby Becker MD     2008             Chronic abdominal pain ICD-10-CM: R10.9, G89.29  ICD-9-CM: 789.00, 338.29  7/15/2017        Migraine headache ICD-10-CM: H11.054  ICD-9-CM: 346.90  7/15/2017        Neurocardiogenic syncope ICD-10-CM: R55  ICD-9-CM: 780.2  7/15/2017        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 The active problem list, all pertinent past medical history, medications, endoscopic studies, radiologic findings   and laboratory findings related to the liver disorder were reviewed with the patient. The patient is a 40 y.o.  female who developed abdominal pain in about 3/2016. She underwent a gastric bypass in 2008. She lost about 100 pounds after the procedure. EGD was performed in 6/2016. This demonstrated no ulcer in the gastric pouch.     An MRI of the abdomen from 2015 demonstrated SMV thrombosis and a collateral shunt between the SMV and the Splenic vein. No evidence of PV thrombosis was seen. Repeat MRI in 6/2017 demonstrated patent SMV and a large collateral/shunt connecting the SMV and splenic vein. We had thought that this collateral could be causing vascular steal and contributing to the abdominal pain. She was evalauted by Dr Samir Villegas and embolization of messenteric varices was performed in 9/2017. A repeat CT scan was performed in 1/2018. Embolization coils are in the area of the previously noted shunt. An assessment of liver fibrosis with biopsy or elastography has not been performed. The most recent laboratory studies indicate that the liver transaminases are normal, ALP is normal, tests of hepatic synthetic and metabolic function are normal, and the platelet count is normal.    The patient notes with embolization the pain is somewhat improved. She is taking Bentyl. She recently saw Dr Coretta Suresh who thinks the pain is visceral sensitvity and is treating this with an antidepresant. The patient has severe limitations in functional activities secondary to abdominal pain. The patient has not experienced fevers, chills, nausea, vomiting,       ALLERGIES  No Known Allergies    MEDICATIONS  Current Outpatient Prescriptions   Medication Sig    rivaroxaban (XARELTO) 20 mg tab tablet Take 1 Tab by mouth daily. Start from 9/30/2017    ondansetron (ZOFRAN ODT) 4 mg disintegrating tablet Take 1 Tab by mouth every eight (8) hours as needed for Nausea.  topiramate (TOPAMAX) 100 mg tablet Take  by mouth two (2) times a day.  dicyclomine (BENTYL) 10 mg capsule Take 10 mg by mouth 4 times daily (before meals and nightly).  midodrine (PROAMITINE) 5 mg tablet Take  by mouth three (3) times daily. No current facility-administered medications for this visit.         SYSTEM REVIEW NOT RELATED TO LIVER DISEASE OR REVIEWED ABOVE:  Constitution systems: Negative for fever, chills, weight gain, weight loss. Eyes: Negative for visual changes. ENT: Negative for sore throat, painful swallowing. Respiratory: Negative for cough, hemoptysis, SOB. Cardiology: Negative for chest pain, palpitations. GI:  Negative for constipation or diarrhea. : Negative for urinary frequency, dysuria, hematuria, nocturia. Skin: Negative for rash. Hematology: Negative for easy bruising, blood clots. Musculo-skelatal: Negative for back pain, muscle pain, weakness. Neurologic: Negative for headaches, dizziness, vertigo, memory problems not related to HE. Psychology: Negative for anxiety, depression. FAMILY HISTORY:  The father  of DM and MI. The mother  of heart disease and a CVA. There is no family history of liver disease. SOCIAL HISTORY:  The patient is . The patient has 2 children,   The patient has never used tobacco products. The patient has never consumed significant amounts of alcohol. The patient has not work. The patient is currently receiving disability. PHYSICAL EXAMINATION:  Visit Vitals    /79 (BP 1 Location: Left arm, BP Patient Position: Sitting)    Pulse 64    Temp 99.4 °F (37.4 °C) (Tympanic)    Resp 16    Ht 5' 6\" (1.676 m)    Wt 150 lb (68 kg)    LMP 2018  Comment: urine HCG negative    SpO2 94%    BMI 24.21 kg/m2     General: No acute distress. Eyes: Sclera anicteric. ENT: No oral lesions. Thyroid normal.  Nodes: No adenopathy. Skin: No spider angiomata. No jaundice. No palmar erythema. Respiratory: Lungs clear to auscultation. Cardiovascular: Regular heart rate. No murmurs. No JVD. Abdomen: Soft non-tender. Liver size normal to percussion/palpation. Spleen not palpable. No obvious ascites. Extremities: No edema. No muscle wasting. No gross arthritic changes. Neurologic: Alert and oriented. Cranial nerves grossly intact. No asterixis.     LABORATORY STUDIES:  [facility] [place] of [address] Units 2018   WBC 4.6 - 13.2 K/uL 8.5   ANC 1.8 - 8.0 K/UL 5.3   HGB 12.0 - 16.0 g/dL 12.8    - 420 K/uL 293   INR 0.8 - 1.2   1.5 (H)   AST 15 - 37 U/L 36   ALT 13 - 56 U/L 44   Alk Phos 45 - 117 U/L 57   Bili, Total 0.2 - 1.0 MG/DL 0.3   Bili, Direct 0.0 - 0.2 MG/DL    Albumin 3.4 - 5.0 g/dL 4.2   BUN 7.0 - 18 MG/DL 9   Creat 0.6 - 1.3 MG/DL 0.73   Na 136 - 145 mmol/L 141   K 3.5 - 5.5 mmol/L 4.0   Cl 100 - 108 mmol/L 106   CO2 21 - 32 mmol/L 25   Glucose 74 - 99 mg/dL 90     SEROLOGIES:  Not available or performed. Testing was performed today. LIVER HISTOLOGY:  Not available or performed    ENDOSCOPIC PROCEDURES:  2018. EGD by MLS. Small gastric pouch. Hemocystic spot with no ulcer. RADIOLOGY:  2017. MRI of liver. Normal appearing liver. No liver mass lesions. Normal spleen. No dilated bile ducts. No bile duct strictures. No ascites. No evidence for SMV thrombosis. Collaterial between branch of SMV and splenic vein. 2017. CT scan abdomen with IV contrast.  Normal appearing liver. No liver mass lesions. Normal spleen. No ascites. OTHER TESTIN2017. Transhepatic mesenteric venogram.  Patent PV, SMV, IMV and splenic vein. No shunt between the SMV and SV was identified. Large mesenteric varix. Coil embolization of mesenteric varix. 10/2017. HIDA scan. Normal.    ASSESSMENT AND PLAN:  Persistent abdominal pain which appears to be improved following embolization of mesenteric varix. Unclear why she developed a mesenteric varix. Probably post-surgical inflammation following the gastric bypass. The previously seen shunt between the SMV and SV seen by MRI could not be identified by angiogram.  This was probably the SMV varix seen by angio.     A HIDA was normal.     The lvier enzymes are normal.  Liver function is normal.  The platelet count is normal.      Based upon laboratory studies and imaging the patient does not appear to have liver disease. The patient reports severe chronic abdominal pain which does not subside. Suggested she see GI as this is likely functional.  Possibly IBS. The patient was directed to continue all current medications at the current dosages. There are no contraindications for the patient to take any medications that are necessary for treatment of other medical issues. The patient was counseled regarding alcohol consumption. The need for vaccination against viral hepatitis A and B will be assessed with serologic and instituted as appropriate. All of the above issues were discussed with the patient. All questions were answered. The patient expressed a clear understanding of the above. No follow-up at [address] is needed. I would be glad to see the patient back for follow-up at any time in the future if the clinical situation changes.     Da Mercer MD  [facility] [place] [facility] [address], [address] [place] [address], [address] [place] [address] Street - [address]  [phone]

## 2018-01-22 NOTE — MR AVS SNAPSHOT
[address] [address] 
[phone] Patient: Silvano Hamman MRN: [id number] BXP:[id number]/7525 Visit Information Date & Time Provider Department Dept. Phone Encounter #  
 1/22/2018  4:30 PM MD Whit Kaufman [address] [id number] Upcoming Health Maintenance Date Due Pneumococcal 19-64 Medium Risk (1 of 1 - PPSV23) 5/24/2000 DTaP/Tdap/Td series (1 - Tdap) 5/24/2002 PAP AKA CERVICAL CYTOLOGY 5/24/2002 Influenza Age 5 to Adult 8/1/2017 Allergies as of 1/22/2018  Review Complete On: 1/22/2018 By: Emma Guajardo No Known Allergies Current Immunizations  Never Reviewed No immunizations on file. Not reviewed this visit Vitals BP Pulse Temp Resp Height(growth percentile) Weight(growth percentile) 111/79 (BP 1 Location: Left arm, BP Patient Position: Sitting) 64 99.4 °F (37.4 °C) (Tympanic) 16 5' 6\" (1.676 m) 150 lb (68 kg) LMP SpO2 BMI OB Status Smoking Status 01/08/2018 94% 24.21 kg/m2 IUD Never Smoker Vitals History BMI and BSA Data Body Mass Index Body Surface Area  
 24.21 kg/m 2 1.78 m 2 Your Updated Medication List  
  
   
This list is accurate as of: 1/22/18  4:49 PM.  Always use your most recent med list.  
  
  
  
  
 BENTYL 10 mg capsule Generic drug:  dicyclomine Take 10 mg by mouth 4 times daily (before meals and nightly). midodrine 5 mg tablet Commonly known as:  Junious Vahe Take  by mouth three (3) times daily. ondansetron 4 mg disintegrating tablet Commonly known as:  ZOFRAN ODT Take 1 Tab by mouth every eight (8) hours as needed for Nausea. rivaroxaban 20 mg Tab tablet Commonly known as:  Darra Aisha Take 1 Tab by mouth daily. Start from 9/30/2017 TOPAMAX 100 mg tablet Generic drug:  topiramate Take  by mouth two (2) times a day. Introducing [facility]! Benny Costa introduces [employer] patient portal. Now you can access parts of your medical record, email your doctor's office, and request medication refills online. 1. In your internet browser, go to [email or web address] 2. Click on the First Time User? Click Here link in the Sign In box. You will see the New Member Sign Up page. 3. Enter your [employer] Access Code exactly as it appears below. You will not need to use this code after youve completed the sign-up process. If you do not sign up before the expiration date, you must request a new code. · [employer] Access Code: [email or web address] Expires: 4/2/2018  9:54 PM 
 
4. Enter the last four digits of your Social Security Number (xxxx) and Date of Birth (mm/dd/yyyy) as indicated and click Submit. You will be taken to the next sign-up page. 5. Create a [employer] ID. This will be your [employer] login ID and cannot be changed, so think of one that is secure and easy to remember. 6. Create a [employer] password. You can change your password at any time. 7. Enter your Password Reset Question and Answer. This can be used at a later time if you forget your password. 8. Enter your e-mail address. You will receive e-mail notification when new information is available in [address] 9. Click Sign Up. You can now view and download portions of your medical record. 10. Click the Download Summary menu link to download a portable copy of your medical information. If you have questions, please visit the Frequently Asked Questions section of the [employer] website. Remember, [employer] is NOT to be used for urgent needs. For medical emergencies, dial 911. Now available from your iPhone and Android! Please provide this summary of care documentation to your next provider. Your primary care clinician is listed as Jamia Hollingsworth. If you have any questions after today's visit, please call [phone].

## 2018-02-15 ENCOUNTER — HOSPITAL ENCOUNTER (OUTPATIENT)
Dept: PREADMISSION TESTING | Age: 38
Discharge: HOME | End: 2018-02-15
Payer: OTHER GOVERNMENT

## 2018-02-15 LAB
ANION GAP SERPL CALC-SCNC: 10 MMOL/L (ref 3–18)
ATRIAL RATE: 90 BPM
BASOPHILS # BLD: 0.1 K/UL (ref 0–0.06)
BASOPHILS NFR BLD: 1 % (ref 0–2)
BUN SERPL-MCNC: 7 MG/DL (ref 7–18)
BUN/CREAT SERPL: 11 (ref 12–20)
CALCIUM SERPL-MCNC: 8.9 MG/DL (ref 8.5–10.1)
CALCULATED P AXIS, ECG09: 64 DEGREES
CALCULATED R AXIS, ECG10: 69 DEGREES
CALCULATED T AXIS, ECG11: -16 DEGREES
CHLORIDE SERPL-SCNC: 109 MMOL/L (ref 100–108)
CO2 SERPL-SCNC: 26 MMOL/L (ref 21–32)
CREAT SERPL-MCNC: 0.64 MG/DL (ref 0.6–1.3)
DIAGNOSIS, 93000: NORMAL
DIFFERENTIAL METHOD BLD: ABNORMAL
EOSINOPHIL # BLD: 0.1 K/UL (ref 0–0.4)
EOSINOPHIL NFR BLD: 2 % (ref 0–5)
ERYTHROCYTE [DISTWIDTH] IN BLOOD BY AUTOMATED COUNT: 13.1 % (ref 11.6–14.5)
GLUCOSE SERPL-MCNC: 87 MG/DL (ref 74–99)
HCT VFR BLD AUTO: 38.1 % (ref 35–45)
HGB BLD-MCNC: 12.3 G/DL (ref 12–16)
LYMPHOCYTES # BLD: 2.3 K/UL (ref 0.9–3.6)
LYMPHOCYTES NFR BLD: 28 % (ref 21–52)
MCH RBC QN AUTO: 32.5 PG (ref 24–34)
MCHC RBC AUTO-ENTMCNC: 32.3 G/DL (ref 31–37)
MCV RBC AUTO: 100.8 FL (ref 74–97)
MONOCYTES # BLD: 0.5 K/UL (ref 0.05–1.2)
MONOCYTES NFR BLD: 7 % (ref 3–10)
NEUTS SEG # BLD: 5 K/UL (ref 1.8–8)
NEUTS SEG NFR BLD: 62 % (ref 40–73)
P-R INTERVAL, ECG05: 134 MS
PLATELET # BLD AUTO: 331 K/UL (ref 135–420)
PMV BLD AUTO: 9.3 FL (ref 9.2–11.8)
POTASSIUM SERPL-SCNC: 3.8 MMOL/L (ref 3.5–5.5)
Q-T INTERVAL, ECG07: 374 MS
QRS DURATION, ECG06: 82 MS
QTC CALCULATION (BEZET), ECG08: 457 MS
RBC # BLD AUTO: 3.78 M/UL (ref 4.2–5.3)
SODIUM SERPL-SCNC: 145 MMOL/L (ref 136–145)
VENTRICULAR RATE, ECG03: 90 BPM
WBC # BLD AUTO: 8 K/UL (ref 4.6–13.2)

## 2018-02-15 PROCEDURE — 36415 COLL VENOUS BLD VENIPUNCTURE: CPT | Performed by: OBSTETRICS & GYNECOLOGY

## 2018-02-15 PROCEDURE — 85025 COMPLETE CBC W/AUTO DIFF WBC: CPT | Performed by: OBSTETRICS & GYNECOLOGY

## 2018-02-15 PROCEDURE — 93005 ELECTROCARDIOGRAM TRACING: CPT

## 2018-02-15 PROCEDURE — 80048 BASIC METABOLIC PNL TOTAL CA: CPT | Performed by: OBSTETRICS & GYNECOLOGY

## 2018-03-02 RX ORDER — CEFAZOLIN SODIUM 2 G/50ML
2 SOLUTION INTRAVENOUS ONCE
Status: CANCELLED | OUTPATIENT
Start: 2018-03-02 | End: 2018-03-02

## 2018-03-02 NOTE — H&P
PATIENT:  Vanessa Oscar  YOB: 1980  DATE:   2018 8:45 AM   VISIT TYPE: Office Visit - GYN        This 40year old female presents for abdominal pain f/u:. History of Present Illness:  1.  abdominal pain f/u:      The symptoms began 4 weeks ago and generally lasts varies. The symptoms are reported as being mild. The symptoms occur constantly. The location is abdomen. The symptoms are described as cramping. Aggravating factors include abdominal pain and rectal bleeding. Relieving factors include none. She states the symptoms are acute and are unchanged. patient presents for ER follow up of abdominal pain and rectal bleeding. states she had a lot of abdominal pain and cramping and was filling the toilet with bright red blood. states she is now on her cycle and having heavy vaginal bleeding. here for pre-op. Past Systemic History    Medical History (Detailed)  Disease Onset Date Comments   breast reduction     gallbladder removal     hernia repairX3       Surgical History/Management (Detailed)  Management Date Comments   Gastric bypass         Medication Reconciliation  Medications reconciled today. Allergies:  Ingredient Reaction Medication Name Comment   NO KNOWN ALLERGIES          Family History  (Detailed)  Relationship Family Member Name  Age at Death Condition Onset Age Cause of Death       Family history of Hypertension  N       Family history of Coronary artery disease  N       Family history of Diabetes mellitus  N       Family history of Stroke  N   Father  Y 72      Mother  N         Social History:  (Detailed)  Preferred language is English. Smoking status: Never smoker. No passive smoke exposure. ALCOHOL  There is a history of alcohol use. Type: Wine. 1 drink consumed occasionally. Last alcoholic drink was yesterday. CAFFEINE  The patient does not use caffeine.       Review of Systems  System Neg/Pos Details   Constitutional Negative Chills, fatigue, fever, malaise, night sweats, weight gain and weight loss. GI Positive Abdominal pain. GI Negative Blood in stool, change in stool pattern, constipation, diarrhea, heartburn, nausea and vomiting.  Negative Dysuria, hematuria, polyuria, urinary frequency, urinary incontinence and urinary retention. Psych Negative Anxiety, depression and insomnia. Integumentary Negative Brittle hair, brittle nails, change in shape/size of mole(s), hair loss, hirsutism, hives, pruritus, rash and skin lesion. Reproductive Positive Dyspareunia. Reproductive Negative Breast discharge, breast lump(s), dysmenorrhea, history of abnormal PAP smear, hot flashes, irregular menses and vaginal discharge. Vital Signs     Height  Time ft in cm Last Measured Height Position   9:37 AM 5.0 6.00 167.64 11/21/2017 Standing     Weight/BSA/BMI  Time lb oz kg Context BMI kg/m2 BSA m2   9:37 .00  66.224  23.56 1.76     Blood Pressure  Time BP mm/Hg Position Side Site Method Cuff Size   9:37 /80          Measured By  Time Measured by   9:37 AM Raul Bass       Physical Exam  Exam Findings Details   Constitutional Normal Well developed. Neck Exam Normal Palpation - Normal. Thyroid gland - Normal.   Abdomen Normal Anterior palpation -  No rebound. No abdominal tenderness. No hernia. Genitourinary * Obesity limits exam accuracy. Genitourinary Normal Urethral meatus - Normal. External genitalia - Normal. Glands - Normal. Perineum - Normal. Anus - Normal. Vagina - Normal. Cervix - Normal. Uterus - Normal. Adnexa - Normal. Bladder - Normal.   Skin Normal Inspection - Normal.   Spine * Inspection - no abnormality. Psychiatric Normal Oriented to time, place, person and situation. Appropriate mood and affect. Assessment/Plan  # Detail Type Description    1. Assessment Endometriosis (N80.9). Patient Plan Proceed with TLH/Bilateral salpingecotmy.   The procedure was discussed with the patient in detail. She understands that the risks include but are not limited to: Heavy bleeding, need for blood transfusion, infection, injury to internal or adjacent organs, risks associated with anesthesia, wound infection or separation, DVT, pulmonary embolus, pneumonia and death associated with surgery. All questions were answered and the patient wants to proceed. 2. Assessment Menorrhagia (N92.0). 3. Assessment Secondary dysmenorrhea (N94.5).

## 2018-03-05 ENCOUNTER — ANESTHESIA EVENT (OUTPATIENT)
Dept: SURGERY | Age: 38
End: 2018-03-05
Payer: OTHER GOVERNMENT

## 2018-03-05 ENCOUNTER — ANESTHESIA (OUTPATIENT)
Dept: SURGERY | Age: 38
End: 2018-03-05
Payer: OTHER GOVERNMENT

## 2018-03-05 ENCOUNTER — HOSPITAL ENCOUNTER (OUTPATIENT)
Age: 38
Setting detail: OUTPATIENT SURGERY
Discharge: HOME | End: 2018-03-05
Attending: OBSTETRICS & GYNECOLOGY | Admitting: OBSTETRICS & GYNECOLOGY
Payer: OTHER GOVERNMENT

## 2018-03-05 VITALS
SYSTOLIC BLOOD PRESSURE: 124 MMHG | TEMPERATURE: 97.4 F | RESPIRATION RATE: 18 BRPM | HEIGHT: 66 IN | DIASTOLIC BLOOD PRESSURE: 83 MMHG | HEART RATE: 96 BPM | WEIGHT: 144.44 LBS | BODY MASS INDEX: 23.21 KG/M2 | OXYGEN SATURATION: 100 %

## 2018-03-05 DIAGNOSIS — Z09 SURGERY FOLLOW-UP: Primary | ICD-10-CM

## 2018-03-05 LAB
ABO + RH BLD: NORMAL
BLOOD GROUP ANTIBODIES SERPL: NORMAL
HCG SERPL QL: NEGATIVE
SPECIMEN EXP DATE BLD: NORMAL

## 2018-03-05 PROCEDURE — 77030002946 HC SUT NRLN J&J -B: Performed by: OBSTETRICS & GYNECOLOGY

## 2018-03-05 PROCEDURE — 74011250636 HC RX REV CODE- 250/636: Performed by: SPECIALIST

## 2018-03-05 PROCEDURE — 77030010507 HC ADH SKN DERMBND J&J -B: Performed by: OBSTETRICS & GYNECOLOGY

## 2018-03-05 PROCEDURE — 77030008683 HC TU ET CUF COVD -A: Performed by: SPECIALIST

## 2018-03-05 PROCEDURE — 76210000016 HC OR PH I REC 1 TO 1.5 HR: Performed by: OBSTETRICS & GYNECOLOGY

## 2018-03-05 PROCEDURE — 77030009957 HC RELD ENDOSTCH COVD -C: Performed by: OBSTETRICS & GYNECOLOGY

## 2018-03-05 PROCEDURE — 77030033200 HC PRT CLSR CRTR THOMP COOP -C: Performed by: OBSTETRICS & GYNECOLOGY

## 2018-03-05 PROCEDURE — 77030018778 HC MANIP UTER VCAR CNMD -B: Performed by: OBSTETRICS & GYNECOLOGY

## 2018-03-05 PROCEDURE — 77030022704 HC SUT VLOC COVD -B: Performed by: OBSTETRICS & GYNECOLOGY

## 2018-03-05 PROCEDURE — 74011000250 HC RX REV CODE- 250: Performed by: OBSTETRICS & GYNECOLOGY

## 2018-03-05 PROCEDURE — 36415 COLL VENOUS BLD VENIPUNCTURE: CPT | Performed by: OBSTETRICS & GYNECOLOGY

## 2018-03-05 PROCEDURE — 77030031139 HC SUT VCRL2 J&J -A: Performed by: OBSTETRICS & GYNECOLOGY

## 2018-03-05 PROCEDURE — 77030032490 HC SLV COMPR SCD KNE COVD -B: Performed by: OBSTETRICS & GYNECOLOGY

## 2018-03-05 PROCEDURE — 74011250636 HC RX REV CODE- 250/636: Performed by: OBSTETRICS & GYNECOLOGY

## 2018-03-05 PROCEDURE — 86900 BLOOD TYPING SEROLOGIC ABO: CPT | Performed by: OBSTETRICS & GYNECOLOGY

## 2018-03-05 PROCEDURE — 74011250636 HC RX REV CODE- 250/636

## 2018-03-05 PROCEDURE — 77030033639 HC SHR ENDO COAG HARM 36 J&J -E: Performed by: OBSTETRICS & GYNECOLOGY

## 2018-03-05 PROCEDURE — 77030002933 HC SUT MCRYL J&J -A: Performed by: OBSTETRICS & GYNECOLOGY

## 2018-03-05 PROCEDURE — 74011250637 HC RX REV CODE- 250/637: Performed by: SPECIALIST

## 2018-03-05 PROCEDURE — 77030034849: Performed by: OBSTETRICS & GYNECOLOGY

## 2018-03-05 PROCEDURE — 77030018832 HC SOL IRR H20 ICUM -A: Performed by: OBSTETRICS & GYNECOLOGY

## 2018-03-05 PROCEDURE — 84703 CHORIONIC GONADOTROPIN ASSAY: CPT | Performed by: OBSTETRICS & GYNECOLOGY

## 2018-03-05 PROCEDURE — 76010000149 HC OR TIME 1 TO 1.5 HR: Performed by: OBSTETRICS & GYNECOLOGY

## 2018-03-05 PROCEDURE — 77030011640 HC PAD GRND REM COVD -A: Performed by: OBSTETRICS & GYNECOLOGY

## 2018-03-05 PROCEDURE — 77030011294 HC FCPS BPLR MCR J&J -B: Performed by: OBSTETRICS & GYNECOLOGY

## 2018-03-05 PROCEDURE — 77030020782 HC GWN BAIR PAWS FLX 3M -B: Performed by: OBSTETRICS & GYNECOLOGY

## 2018-03-05 PROCEDURE — 76210000031 HC REC RM PH II 4.5 TO 5 HR: Performed by: OBSTETRICS & GYNECOLOGY

## 2018-03-05 PROCEDURE — 74011000250 HC RX REV CODE- 250

## 2018-03-05 PROCEDURE — 77030008603 HC TRCR ENDOSC EPATH J&J -C: Performed by: OBSTETRICS & GYNECOLOGY

## 2018-03-05 PROCEDURE — 77030037241 HC PRT ACC BLDLSS AIRSEAL CNMD -B: Performed by: OBSTETRICS & GYNECOLOGY

## 2018-03-05 PROCEDURE — 76060000033 HC ANESTHESIA 1 TO 1.5 HR: Performed by: OBSTETRICS & GYNECOLOGY

## 2018-03-05 PROCEDURE — 88307 TISSUE EXAM BY PATHOLOGIST: CPT | Performed by: OBSTETRICS & GYNECOLOGY

## 2018-03-05 PROCEDURE — 77030020255 HC SOL INJ LR 1000ML BG: Performed by: OBSTETRICS & GYNECOLOGY

## 2018-03-05 RX ORDER — HYDROMORPHONE HYDROCHLORIDE 2 MG/ML
0.5 INJECTION, SOLUTION INTRAMUSCULAR; INTRAVENOUS; SUBCUTANEOUS
Status: COMPLETED | OUTPATIENT
Start: 2018-03-05 | End: 2018-03-05

## 2018-03-05 RX ORDER — PROMETHAZINE HYDROCHLORIDE 25 MG/1
25 TABLET ORAL
Qty: 30 TAB | Refills: 0 | Status: SHIPPED | OUTPATIENT
Start: 2018-03-05 | End: 2018-10-22

## 2018-03-05 RX ORDER — FENTANYL CITRATE 50 UG/ML
25 INJECTION, SOLUTION INTRAMUSCULAR; INTRAVENOUS
Status: ACTIVE | OUTPATIENT
Start: 2018-03-05 | End: 2018-03-05

## 2018-03-05 RX ORDER — SODIUM CHLORIDE, SODIUM LACTATE, POTASSIUM CHLORIDE, CALCIUM CHLORIDE 600; 310; 30; 20 MG/100ML; MG/100ML; MG/100ML; MG/100ML
50 INJECTION, SOLUTION INTRAVENOUS CONTINUOUS
Status: DISCONTINUED | OUTPATIENT
Start: 2018-03-05 | End: 2018-03-05 | Stop reason: HOSPADM

## 2018-03-05 RX ORDER — ONDANSETRON 2 MG/ML
4 INJECTION INTRAMUSCULAR; INTRAVENOUS ONCE
Status: DISCONTINUED | OUTPATIENT
Start: 2018-03-05 | End: 2018-03-05 | Stop reason: HOSPADM

## 2018-03-05 RX ORDER — OXYCODONE AND ACETAMINOPHEN 5; 325 MG/1; MG/1
1 TABLET ORAL AS NEEDED
Status: DISCONTINUED | OUTPATIENT
Start: 2018-03-05 | End: 2018-03-05 | Stop reason: HOSPADM

## 2018-03-05 RX ORDER — OXYCODONE AND ACETAMINOPHEN 5; 325 MG/1; MG/1
1 TABLET ORAL
Qty: 30 TAB | Refills: 0 | Status: SHIPPED | OUTPATIENT
Start: 2018-03-05 | End: 2018-10-22

## 2018-03-05 RX ORDER — LIDOCAINE HYDROCHLORIDE 20 MG/ML
INJECTION, SOLUTION EPIDURAL; INFILTRATION; INTRACAUDAL; PERINEURAL AS NEEDED
Status: DISCONTINUED | OUTPATIENT
Start: 2018-03-05 | End: 2018-03-05 | Stop reason: HOSPADM

## 2018-03-05 RX ORDER — NALOXONE HYDROCHLORIDE 0.4 MG/ML
0.1 INJECTION, SOLUTION INTRAMUSCULAR; INTRAVENOUS; SUBCUTANEOUS
Status: DISCONTINUED | OUTPATIENT
Start: 2018-03-05 | End: 2018-03-05 | Stop reason: HOSPADM

## 2018-03-05 RX ORDER — ENOXAPARIN SODIUM 100 MG/ML
40 INJECTION SUBCUTANEOUS EVERY 24 HOURS
Status: DISCONTINUED | OUTPATIENT
Start: 2018-03-05 | End: 2018-03-05 | Stop reason: HOSPADM

## 2018-03-05 RX ORDER — DIPHENHYDRAMINE HYDROCHLORIDE 50 MG/ML
12.5 INJECTION, SOLUTION INTRAMUSCULAR; INTRAVENOUS ONCE
Status: COMPLETED | OUTPATIENT
Start: 2018-03-05 | End: 2018-03-05

## 2018-03-05 RX ORDER — PROPOFOL 10 MG/ML
INJECTION, EMULSION INTRAVENOUS AS NEEDED
Status: DISCONTINUED | OUTPATIENT
Start: 2018-03-05 | End: 2018-03-05 | Stop reason: HOSPADM

## 2018-03-05 RX ORDER — ONDANSETRON 2 MG/ML
INJECTION INTRAMUSCULAR; INTRAVENOUS AS NEEDED
Status: DISCONTINUED | OUTPATIENT
Start: 2018-03-05 | End: 2018-03-05 | Stop reason: HOSPADM

## 2018-03-05 RX ORDER — FENTANYL CITRATE 50 UG/ML
INJECTION, SOLUTION INTRAMUSCULAR; INTRAVENOUS AS NEEDED
Status: DISCONTINUED | OUTPATIENT
Start: 2018-03-05 | End: 2018-03-05 | Stop reason: HOSPADM

## 2018-03-05 RX ORDER — CEFAZOLIN SODIUM 2 G/50ML
2 SOLUTION INTRAVENOUS ONCE
Status: COMPLETED | OUTPATIENT
Start: 2018-03-05 | End: 2018-03-05

## 2018-03-05 RX ORDER — DEXAMETHASONE SODIUM PHOSPHATE 4 MG/ML
INJECTION, SOLUTION INTRA-ARTICULAR; INTRALESIONAL; INTRAMUSCULAR; INTRAVENOUS; SOFT TISSUE AS NEEDED
Status: DISCONTINUED | OUTPATIENT
Start: 2018-03-05 | End: 2018-03-05 | Stop reason: HOSPADM

## 2018-03-05 RX ORDER — SODIUM CHLORIDE, SODIUM LACTATE, POTASSIUM CHLORIDE, CALCIUM CHLORIDE 600; 310; 30; 20 MG/100ML; MG/100ML; MG/100ML; MG/100ML
125 INJECTION, SOLUTION INTRAVENOUS CONTINUOUS
Status: DISCONTINUED | OUTPATIENT
Start: 2018-03-05 | End: 2018-03-05 | Stop reason: HOSPADM

## 2018-03-05 RX ORDER — SODIUM CHLORIDE 0.9 % (FLUSH) 0.9 %
5-10 SYRINGE (ML) INJECTION AS NEEDED
Status: DISCONTINUED | OUTPATIENT
Start: 2018-03-05 | End: 2018-03-05 | Stop reason: HOSPADM

## 2018-03-05 RX ORDER — GLYCOPYRROLATE 0.2 MG/ML
INJECTION INTRAMUSCULAR; INTRAVENOUS AS NEEDED
Status: DISCONTINUED | OUTPATIENT
Start: 2018-03-05 | End: 2018-03-05 | Stop reason: HOSPADM

## 2018-03-05 RX ORDER — ROCURONIUM BROMIDE 10 MG/ML
INJECTION, SOLUTION INTRAVENOUS AS NEEDED
Status: DISCONTINUED | OUTPATIENT
Start: 2018-03-05 | End: 2018-03-05 | Stop reason: HOSPADM

## 2018-03-05 RX ORDER — NEOSTIGMINE METHYLSULFATE 5 MG/5 ML
SYRINGE (ML) INTRAVENOUS AS NEEDED
Status: DISCONTINUED | OUTPATIENT
Start: 2018-03-05 | End: 2018-03-05 | Stop reason: HOSPADM

## 2018-03-05 RX ORDER — BUPIVACAINE HYDROCHLORIDE 2.5 MG/ML
INJECTION, SOLUTION EPIDURAL; INFILTRATION; INTRACAUDAL AS NEEDED
Status: DISCONTINUED | OUTPATIENT
Start: 2018-03-05 | End: 2018-03-05 | Stop reason: HOSPADM

## 2018-03-05 RX ORDER — MIDAZOLAM HYDROCHLORIDE 1 MG/ML
INJECTION, SOLUTION INTRAMUSCULAR; INTRAVENOUS AS NEEDED
Status: DISCONTINUED | OUTPATIENT
Start: 2018-03-05 | End: 2018-03-05 | Stop reason: HOSPADM

## 2018-03-05 RX ADMIN — GLYCOPYRROLATE 0.6 MG: 0.2 INJECTION INTRAMUSCULAR; INTRAVENOUS at 12:03

## 2018-03-05 RX ADMIN — HYDROMORPHONE HYDROCHLORIDE 0.5 MG: 2 INJECTION INTRAMUSCULAR; INTRAVENOUS; SUBCUTANEOUS at 12:32

## 2018-03-05 RX ADMIN — HYDROMORPHONE HYDROCHLORIDE 0.5 MG: 2 INJECTION, SOLUTION INTRAMUSCULAR; INTRAVENOUS; SUBCUTANEOUS at 18:28

## 2018-03-05 RX ADMIN — Medication 3 MG: at 12:03

## 2018-03-05 RX ADMIN — HYDROMORPHONE HYDROCHLORIDE 0.5 MG: 2 INJECTION INTRAMUSCULAR; INTRAVENOUS; SUBCUTANEOUS at 12:46

## 2018-03-05 RX ADMIN — DIPHENHYDRAMINE HYDROCHLORIDE 12.5 MG: 50 INJECTION, SOLUTION INTRAMUSCULAR; INTRAVENOUS at 13:19

## 2018-03-05 RX ADMIN — HYDROMORPHONE HYDROCHLORIDE 0.5 MG: 2 INJECTION INTRAMUSCULAR; INTRAVENOUS; SUBCUTANEOUS at 13:06

## 2018-03-05 RX ADMIN — HYDROMORPHONE HYDROCHLORIDE 0.5 MG: 2 INJECTION, SOLUTION INTRAMUSCULAR; INTRAVENOUS; SUBCUTANEOUS at 00:05

## 2018-03-05 RX ADMIN — PROPOFOL 200 MG: 10 INJECTION, EMULSION INTRAVENOUS at 11:13

## 2018-03-05 RX ADMIN — DEXAMETHASONE SODIUM PHOSPHATE 4 MG: 4 INJECTION, SOLUTION INTRA-ARTICULAR; INTRALESIONAL; INTRAMUSCULAR; INTRAVENOUS; SOFT TISSUE at 11:29

## 2018-03-05 RX ADMIN — FENTANYL CITRATE 50 MCG: 50 INJECTION, SOLUTION INTRAMUSCULAR; INTRAVENOUS at 11:45

## 2018-03-05 RX ADMIN — SODIUM CHLORIDE, SODIUM LACTATE, POTASSIUM CHLORIDE, AND CALCIUM CHLORIDE: 600; 310; 30; 20 INJECTION, SOLUTION INTRAVENOUS at 11:09

## 2018-03-05 RX ADMIN — SODIUM CHLORIDE, SODIUM LACTATE, POTASSIUM CHLORIDE, AND CALCIUM CHLORIDE 125 ML/HR: 600; 310; 30; 20 INJECTION, SOLUTION INTRAVENOUS at 09:48

## 2018-03-05 RX ADMIN — ENOXAPARIN SODIUM 40 MG: 40 INJECTION SUBCUTANEOUS at 10:59

## 2018-03-05 RX ADMIN — ROCURONIUM BROMIDE 40 MG: 10 INJECTION, SOLUTION INTRAVENOUS at 11:13

## 2018-03-05 RX ADMIN — HYDROMORPHONE HYDROCHLORIDE 0.5 MG: 2 INJECTION INTRAMUSCULAR; INTRAVENOUS; SUBCUTANEOUS at 12:56

## 2018-03-05 RX ADMIN — MIDAZOLAM HYDROCHLORIDE 2 MG: 1 INJECTION, SOLUTION INTRAMUSCULAR; INTRAVENOUS at 11:09

## 2018-03-05 RX ADMIN — FENTANYL CITRATE 100 MCG: 50 INJECTION, SOLUTION INTRAMUSCULAR; INTRAVENOUS at 11:13

## 2018-03-05 RX ADMIN — HYDROMORPHONE HYDROCHLORIDE 0.5 MG: 2 INJECTION, SOLUTION INTRAMUSCULAR; INTRAVENOUS; SUBCUTANEOUS at 18:06

## 2018-03-05 RX ADMIN — CEFAZOLIN SODIUM 2 G: 2 SOLUTION INTRAVENOUS at 11:20

## 2018-03-05 RX ADMIN — OXYCODONE HYDROCHLORIDE AND ACETAMINOPHEN 1 TABLET: 5; 325 TABLET ORAL at 16:41

## 2018-03-05 RX ADMIN — LIDOCAINE HYDROCHLORIDE 60 MG: 20 INJECTION, SOLUTION EPIDURAL; INFILTRATION; INTRACAUDAL; PERINEURAL at 11:13

## 2018-03-05 RX ADMIN — ONDANSETRON 4 MG: 2 INJECTION INTRAMUSCULAR; INTRAVENOUS at 11:29

## 2018-03-05 NOTE — IP AVS SNAPSHOT
[address] [address] 
[phone] Patient: Nelly Whaley MRN: [id number] About your hospitalization You were admitted on:  March 5, 2018 You last received care in the:  [facility] PACU You were discharged on:  March 5, 2018 Why you were hospitalized Your primary diagnosis was:  Not on File Follow-up Information Follow up With Details Comments Contact Info Macho Spear PA-C   [address] [address] 
[phone] Moe Russell MD   [address] [facility] [address] [address] 
[phone] Discharge Orders None A check chantale indicates which time of day the medication should be taken. My Medications START taking these medications Instructions Each Dose to Equal  
 Morning Noon Evening Bedtime  
 oxyCODONE-acetaminophen 5-325 mg per tablet Commonly known as:  PERCOCET Your last dose was: Your next dose is: Take 1 Tab by mouth every four (4) hours as needed for Pain. Max Daily Amount: 6 Tabs. Indications: surgery 1 Tab  
    
   
   
   
  
 promethazine 25 mg tablet Commonly known as:  PHENERGAN Your last dose was: Your next dose is: Take 1 Tab by mouth every six (6) hours as needed for Nausea. 25 mg CHANGE how you take these medications Instructions Each Dose to Equal  
 Morning Noon Evening Bedtime  
 rivaroxaban 20 mg Tab tablet Commonly known as:  Sarah Chino What changed:   
- when to take this 
- additional instructions Your last dose was: Your next dose is: Take 1 Tab by mouth daily. Start from 9/30/2017  
 20 mg CONTINUE taking these medications Instructions Each Dose to Equal  
 Morning Noon Evening Bedtime BENTYL 10 mg capsule Generic drug:  dicyclomine Your last dose was: Your next dose is: Take 20 mg by mouth three (3) times daily. 20 mg  
    
   
   
   
  
 midodrine 5 mg tablet Commonly known as:  Marylee Reese Your last dose was: Your next dose is: Take 10 mg by mouth three (3) times daily. Indications: Symptomatic Orthostatic Hypotension 10 mg  
    
   
   
   
  
 multivitamin, tx-iron-ca-min 9 mg iron-400 mcg Tab tablet Commonly known as:  THERA-M w/ IRON Your last dose was: Your next dose is: Take 1 Tab by mouth daily. 1 Tab  
    
   
   
   
  
 TOPAMAX 100 mg tablet Generic drug:  topiramate Your last dose was: Your next dose is: Take 100 mg by mouth nightly. Indications: MIGRAINE PREVENTION  
 100 mg  
    
   
   
   
  
 TYLENOL 325 mg tablet Generic drug:  acetaminophen Your last dose was: Your next dose is: Take 325 mg by mouth every four (4) hours as needed for Pain. 325 mg Where to Get Your Medications Information on where to get these meds will be given to you by the nurse or doctor. ! Ask your nurse or doctor about these medications  
  oxyCODONE-acetaminophen 5-325 mg per tablet  
 promethazine 25 mg tablet Discharge Instructions Laparoscopically Assisted Vaginal Hysterectomy: What to Expect at [facility] Your Recovery You can expect to feel better and stronger each day, although you may need pain medicine for a week or two. You may get tired easily or have less energy than usual. This may last for several weeks after surgery. You will probably notice that your belly is swollen and puffy. This is common. The swelling will take several weeks to go down. It may take about 4 to 6 weeks to fully recover. The recovery time may be less for some patients. You may have some light vaginal bleeding.  Don't have sex until the doctor says it is okay. Don't douche or put anything into your vagina, such as a tampon, until your doctor says it is okay. It is important to avoid lifting while you are recovering so that you can heal. 
This care sheet gives you a general idea about how long it will take for you to recover. But each person recovers at a different pace. Follow the steps below to get better as quickly as possible. How can you care for yourself at home? Activity ? · Rest when you feel tired. Getting enough sleep will help you recover. ? · Try to walk each day. Start by walking a little more than you did the day before. Bit by bit, increase the amount you walk. Walking boosts blood flow and helps prevent pneumonia and constipation. ? · Avoid lifting anything that would make you strain. This may include heavy grocery bags and milk containers, a heavy briefcase or backpack, cat litter or dog food bags, a vacuum , or a child. ? · Avoid strenuous activities, such as biking, jogging, weight lifting, or aerobic exercise, until your doctor says it is okay. ? · You may shower. Pat the cut (incision) dry. Do not take a bath for the first 2 weeks, or until your doctor tells you it is okay. ? · Ask your doctor when you can drive again. ? · You will probably need to take about 2 weeks off from work. It depends on the type of work you do and how you feel. ? · Your doctor will tell you when you can have sex again. Diet ? · You can eat your normal diet. If your stomach is upset, try bland, low-fat foods like plain rice, broiled chicken, toast, and yogurt. ? · Drink plenty of fluids (unless your doctor tells you not to). ? · You may notice that your bowel movements are not regular right after your surgery. This is common. Try to avoid constipation and straining with bowel movements. You may want to take a fiber supplement every day.  If you have not had a bowel movement after a couple of days, ask your doctor about taking a mild laxative. Medicines ? · Your doctor will tell you if and when you can restart your medicines. He or she will also give you instructions about taking any new medicines. ? · If you take blood thinners, such as warfarin (Coumadin), clopidogrel (Plavix), or aspirin, be sure to talk to your doctor. He or she will tell you if and when to start taking those medicines again. Make sure that you understand exactly what your doctor wants you to do. ? · Be safe with medicines. Take pain medicines exactly as directed. ¨ If the doctor gave you a prescription medicine for pain, take it as prescribed. ¨ If you are not taking a prescription pain medicine, ask your doctor if you can take an over-the-counter medicine. ? · If you think your pain medicine is making you sick to your stomach: 
¨ Take your medicine after meals (unless your doctor has told you not to). ¨ Ask your doctor for a different pain medicine. ? · If your doctor prescribed antibiotics, take them as directed. Do not stop taking them just because you feel better. You need to take the full course of antibiotics. Incision care ? · You may have stitches over the cuts (incisions) the doctor made in your belly. If you have strips of tape on the incisions the doctor made, leave the tape on for a week or until it falls off. Or follow your doctor's instructions for removing the tape. ? · Wash the area daily with warm, soapy water, and pat it dry. Don't use hydrogen peroxide or alcohol, which can slow healing. You may cover the area with a gauze bandage if it weeps or rubs against clothing. Change the bandage every day. ? · Keep the area clean and dry. Other instructions ? · You may have some light vaginal bleeding. Wear sanitary pads if needed. Do not douche or use tampons. Follow-up care is a key part of your treatment and safety.  Be sure to make and go to all appointments, and call your doctor if you are having problems. It's also a good idea to know your test results and keep a list of the medicines you take. When should you call for help? Call 911 anytime you think you may need emergency care. For example, call if: 
? · You passed out (lost consciousness). ? · You have chest pain, are short of breath, or cough up blood. ?Call your doctor now or seek immediate medical care if: 
? · You have pain that does not get better after you take pain medicine. ? · You cannot pass stools or gas. ? · You have vaginal discharge that has increased in amount or smells bad.  
? · You are sick to your stomach or cannot drink fluids. ? · You have loose stitches, or your incision comes open. ? · Bright red blood has soaked through the bandage over your incision. ? · You have signs of infection, such as: 
¨ Increased pain, swelling, warmth, or redness. ¨ Red streaks leading from the incision. ¨ Pus draining from the incision. ¨ A fever. ? · You have bright red vaginal bleeding that soaks one or more pads in an hour, or you have large clots. ? · You have signs of a blood clot in your leg (called a deep vein thrombosis), such as: 
¨ Pain in your calf, back of the knee, thigh, or groin. ¨ Redness and swelling in your leg. ? Watch closely for changes in your health, and be sure to contact your doctor if you have any problems. Where can you learn more? Go to http://radha-hailey.info/. Enter B514 in the search box to learn more about \"Laparoscopically Assisted Vaginal Hysterectomy: What to Expect at Home. \" Current as of: October 13, 2016 Content Version: 11.4 © 3567-6781 [employer]. Care instructions adapted under license by [employer] (which disclaims liability or warranty for this information).  If you have questions about a medical condition or this instruction, always ask your healthcare professional. [employer] disclaims any warranty or liability for your use of this information. DISCHARGE SUMMARY from Nurse PATIENT INSTRUCTIONS: 
 
 
F-face looks uneven A-arms unable to move or move unevenly S-speech slurred or non-existent T-time-call 911 as soon as signs and symptoms begin-DO NOT go Back to bed or wait to see if you get better-TIME IS BRAIN. Warning Signs of HEART ATTACK Call 911 if you have these symptoms: 
? Chest discomfort. Most heart attacks involve discomfort in the center of the chest that lasts more than a few minutes, or that goes away and comes back. It can feel like uncomfortable pressure, squeezing, fullness, or pain. ? Discomfort in other areas of the upper body. Symptoms can include pain or discomfort in one or both arms, the back, neck, jaw, or stomach. ? Shortness of breath with or without chest discomfort. ? Other signs may include breaking out in a cold sweat, nausea, or lightheadedness. Don't wait more than five minutes to call [address]! Fast action can save your life. Calling 911 is almost always the fastest way to get lifesaving treatment. Emergency Medical Services staff can begin treatment when they arrive  up to an hour sooner than if someone gets to the hospital by car. Patient armband removed and shredded The discharge information has been reviewed with the patient and caregiver. The patient and caregiver verbalized understanding. Discharge medications reviewed with the patient and caregiver and appropriate educational materials and side effects teaching were provided.  
___________________________________________________________________________ ________________________________________________________ Introducing [facility]! Mellissa Kelsey introduces [employer] patient portal. Now you can access parts of your medical record, email your doctor's office, and request medication refills online. 1. In your internet browser, go to [email or web address] 2. Click on the First Time User? Click Here link in the Sign In box. You will see the New Member Sign Up page. 3. Enter your [employer] Access Code exactly as it appears below. You will not need to use this code after youve completed the sign-up process. If you do not sign up before the expiration date, you must request a new code. · [employer] Access Code: [email or web address] Expires: 4/2/2018  9:54 PM 
 
4. Enter the last four digits of your Social Security Number (xxxx) and Date of Birth (mm/dd/yyyy) as indicated and click Submit. You will be taken to the next sign-up page. 5. Create a [employer] ID. This will be your [employer] login ID and cannot be changed, so think of one that is secure and easy to remember. 6. Create a [employer] password. You can change your password at any time. 7. Enter your Password Reset Question and Answer. This can be used at a later time if you forget your password. 8. Enter your e-mail address. You will receive e-mail notification when new information is available in [address] 9. Click Sign Up. You can now view and download portions of your medical record. 10. Click the Download Summary menu link to download a portable copy of your medical information. If you have questions, please visit the Frequently Asked Questions section of the [employer] website. Remember, [employer] is NOT to be used for urgent needs. For medical emergencies, dial 911. Now available from your iPhone and Android! Providers Seen During Your Hospitalization Provider Specialty Primary office phone Ro Sexton MD Obstetrics & Gynecology [phone] Your Primary Care Physician (PCP) Primary Care Physician Office Phone Office Fax Mena Gonzalez [phone] You are allergic to the following No active allergies Recent Documentation Height Weight BMI OB Status Smoking Status 1.676 m 65.5 kg 23.31 kg/m2 Having regular periods Never Smoker Emergency Contacts Name Discharge Info Relation Home Work Mobile Jay Coppola DISCHARGE CAREGIVER [3] Spouse [3]   [phone] Patient Belongings The following personal items are in your possession at time of discharge: 
  Dental Appliances: None         Home Medications: None   Jewelry: Ring (with white stone - with spouse)  Clothing: Pants, Shirt, Footwear, Undergarments, Socks, Jacket/Coat (WITH SPOUSE- Jorge Alberto Chapman)    Other Valuables: Cell Phone Please provide this summary of care documentation to your next provider. Signatures-by signing, you are acknowledging that this After Visit Summary has been reviewed with you and you have received a copy. Patient Signature:  ____________________________________________________________ Date:  ____________________________________________________________  
  
Marlyse Leaks Provider Signature:  ____________________________________________________________ Date:  ____________________________________________________________

## 2018-03-05 NOTE — DISCHARGE INSTRUCTIONS
Laparoscopically Assisted Vaginal Hysterectomy: What to Expect at [address] [place] [address] can expect to feel better and stronger each day, although you may need pain medicine for a week or two. You may get tired easily or have less energy than usual. This may last for several weeks after surgery. You will probably notice that your belly is swollen and puffy. This is common. The swelling will take several weeks to go down. It may take about 4 to 6 weeks to fully recover. The recovery time may be less for some patients. You may have some light vaginal bleeding. Don't have sex until the doctor says it is okay. Don't douche or put anything into your vagina, such as a tampon, until your doctor says it is okay. It is important to avoid lifting while you are recovering so that you can heal.  This care sheet gives you a general idea about how long it will take for you to recover. But each person recovers at a different pace. Follow the steps below to get better as quickly as possible. How can you care for yourself at home? Activity  ? · Rest when you feel tired. Getting enough sleep will help you recover. ? · Try to walk each day. Start by walking a little more than you did the day before. Bit by bit, increase the amount you walk. Walking boosts blood flow and helps prevent pneumonia and constipation. ? · Avoid lifting anything that would make you strain. This may include heavy grocery bags and milk containers, a heavy briefcase or backpack, cat litter or dog food bags, a vacuum , or a child. ? · Avoid strenuous activities, such as biking, jogging, weight lifting, or aerobic exercise, until your doctor says it is okay. ? · You may shower. Pat the cut (incision) dry. Do not take a bath for the first 2 weeks, or until your doctor tells you it is okay. ? · Ask your doctor when you can drive again. ? · You will probably need to take about 2 weeks off from work.  It depends on the type of work you do and how you feel. ? · Your doctor will tell you when you can have sex again. Diet  ? · You can eat your normal diet. If your stomach is upset, try bland, low-fat foods like plain rice, broiled chicken, toast, and yogurt. ? · Drink plenty of fluids (unless your doctor tells you not to). ? · You may notice that your bowel movements are not regular right after your surgery. This is common. Try to avoid constipation and straining with bowel movements. You may want to take a fiber supplement every day. If you have not had a bowel movement after a couple of days, ask your doctor about taking a mild laxative. Medicines  ? · Your doctor will tell you if and when you can restart your medicines. He or she will also give you instructions about taking any new medicines. ? · If you take blood thinners, such as warfarin (Coumadin), clopidogrel (Plavix), or aspirin, be sure to talk to your doctor. He or she will tell you if and when to start taking those medicines again. Make sure that you understand exactly what your doctor wants you to do. ? · Be safe with medicines. Take pain medicines exactly as directed. ¨ If the doctor gave you a prescription medicine for pain, take it as prescribed. ¨ If you are not taking a prescription pain medicine, ask your doctor if you can take an over-the-counter medicine. ? · If you think your pain medicine is making you sick to your stomach:  ¨ Take your medicine after meals (unless your doctor has told you not to). ¨ Ask your doctor for a different pain medicine. ? · If your doctor prescribed antibiotics, take them as directed. Do not stop taking them just because you feel better. You need to take the full course of antibiotics. Incision care  ? · You may have stitches over the cuts (incisions) the doctor made in your belly. If you have strips of tape on the incisions the doctor made, leave the tape on for a week or until it falls off.  Or follow your doctor's instructions for removing the tape. ? · Wash the area daily with warm, soapy water, and pat it dry. Don't use hydrogen peroxide or alcohol, which can slow healing. You may cover the area with a gauze bandage if it weeps or rubs against clothing. Change the bandage every day. ? · Keep the area clean and dry. Other instructions  ? · You may have some light vaginal bleeding. Wear sanitary pads if needed. Do not douche or use tampons. Follow-up care is a key part of your treatment and safety. Be sure to make and go to all appointments, and call your doctor if you are having problems. It's also a good idea to know your test results and keep a list of the medicines you take. When should you call for help? Call 911 anytime you think you may need emergency care. For example, call if:  ? · You passed out (lost consciousness). ? · You have chest pain, are short of breath, or cough up blood. ?Call your doctor now or seek immediate medical care if:  ? · You have pain that does not get better after you take pain medicine. ? · You cannot pass stools or gas. ? · You have vaginal discharge that has increased in amount or smells bad.   ? · You are sick to your stomach or cannot drink fluids. ? · You have loose stitches, or your incision comes open. ? · Bright red blood has soaked through the bandage over your incision. ? · You have signs of infection, such as:  ¨ Increased pain, swelling, warmth, or redness. ¨ Red streaks leading from the incision. ¨ Pus draining from the incision. ¨ A fever. ? · You have bright red vaginal bleeding that soaks one or more pads in an hour, or you have large clots. ? · You have signs of a blood clot in your leg (called a deep vein thrombosis), such as:  ¨ Pain in your calf, back of the knee, thigh, or groin. ¨ Redness and swelling in your leg. ? Watch closely for changes in your health, and be sure to contact your doctor if you have any problems. Where can you learn more?   Go to http://radha-hailey.info/. Enter E929 in the search box to learn more about \"Laparoscopically Assisted Vaginal Hysterectomy: What to Expect at Home. \"  Current as of: October 13, 2016  Content Version: 11.4  © 6633-4225 [employer]. Care instructions adapted under license by [employer] (which disclaims liability or warranty for this information). If you have questions about a medical condition or this instruction, always ask your healthcare professional. [address] any warranty or liability for your use of this information. DISCHARGE SUMMARY from Nurse    PATIENT INSTRUCTIONS:    After general anesthesia or intravenous sedation, for 24 hours or while taking prescription Narcotics:  · Limit your activities  · Do not drive and operate hazardous machinery  · Do not make important personal or business decisions  · Do  not drink alcoholic beverages  · If you have not urinated within 8 hours after discharge, please contact your surgeon on call. Report the following to your surgeon:  · Excessive pain, swelling, redness or odor of or around the surgical area  · Temperature over 100.5  · Nausea and vomiting lasting longer than 4 hours or if unable to take medications  · Any signs of decreased circulation or nerve impairment to extremity: change in color, persistent  numbness, tingling, coldness or increase pain  · Any questions    What to do at Home:  Recommended activity: Ambulate in house, No sex until advised  and No driving while on analgesics,     If you experience any of the following symptoms fever, chills, uncontrollable pain , active bleeding, please follow up with Dr. Ghislaine Bloom. *  Please give a list of your current medications to your Primary Care Provider. *  Please update this list whenever your medications are discontinued, doses are      changed, or new medications (including over-the-counter products) are added.     *  Please carry medication information at all times in case of emergency situations. These are general instructions for a healthy lifestyle:    No smoking/ No tobacco products/ Avoid exposure to second hand smoke  Surgeon General's Warning:  Quitting smoking now greatly reduces serious risk to your health. Obesity, smoking, and sedentary lifestyle greatly increases your risk for illness    A healthy diet, regular physical exercise & weight monitoring are important for maintaining a healthy lifestyle    You may be retaining fluid if you have a history of heart failure or if you experience any of the following symptoms:  Weight gain of 3 pounds or more overnight or 5 pounds in a week, increased swelling in our hands or feet or shortness of breath while lying flat in bed. Please call your doctor as soon as you notice any of these symptoms; do not wait until your next office visit. Recognize signs and symptoms of STROKE:    F-face looks uneven    A-arms unable to move or move unevenly    S-speech slurred or non-existent    T-time-call 911 as soon as signs and symptoms begin-DO NOT go       Back to bed or wait to see if you get better-TIME IS BRAIN. Warning Signs of HEART ATTACK     Call 911 if you have these symptoms:   Chest discomfort. Most heart attacks involve discomfort in the center of the chest that lasts more than a few minutes, or that goes away and comes back. It can feel like uncomfortable pressure, squeezing, fullness, or pain.  Discomfort in other areas of the upper body. Symptoms can include pain or discomfort in one or both arms, the back, neck, jaw, or stomach.  Shortness of breath with or without chest discomfort.  Other signs may include breaking out in a cold sweat, nausea, or lightheadedness. Don't wait more than five minutes to call 911 - MINUTES MATTER! Fast action can save your life. Calling 911 is almost always the fastest way to get lifesaving treatment.  Emergency Medical Services staff can begin treatment when they arrive -- up to an hour sooner than if someone gets to the hospital by car. Patient armband removed and shredded    The discharge information has been reviewed with the patient and caregiver. The patient and caregiver verbalized understanding. Discharge medications reviewed with the patient and caregiver and appropriate educational materials and side effects teaching were provided.   ___________________________________________________________________________________________________________________________________

## 2018-03-05 NOTE — ANESTHESIA PREPROCEDURE EVALUATION
Anesthetic History   No history of anesthetic complications            Review of Systems / Medical History  Patient summary reviewed, nursing notes reviewed and pertinent labs reviewed    Pulmonary  Within defined limits                 Neuro/Psych   Within defined limits           Cardiovascular                  Exercise tolerance: >4 METS  Comments: Neurocardiogenic syncope - low BP - extensive cardiac workup (per patient) negative   GI/Hepatic/Renal  Within defined limits           Pertinent negatives: No PUD   Endo/Other        Arthritis     Other Findings              Physical Exam    Airway  Mallampati: II  TM Distance: 4 - 6 cm  Neck ROM: normal range of motion   Mouth opening: Normal     Cardiovascular               Dental    Dentition: Caps/crowns     Pulmonary                 Abdominal         Other Findings            Anesthetic Plan    ASA: 2  Anesthesia type: general          Induction: Intravenous  Anesthetic plan and risks discussed with: Patient and Spouse      EKG changes are chronic when compared to old EKG.

## 2018-03-05 NOTE — PERIOP NOTES
Reviewed discharge instruction with patient and her , they verbalized understanding, written instructions provided

## 2018-03-05 NOTE — ANESTHESIA POSTPROCEDURE EVALUATION
Post-Anesthesia Evaluation and Assessment    Cardiovascular Function/Vital Signs  Visit Vitals    /86    Pulse 74    Temp 36.8 °C (98.3 °F)    Resp 11    Ht 5' 6\" (1.676 m)    Wt 65.5 kg (144 lb 7 oz)    SpO2 100%    BMI 23.31 kg/m2       Patient is status post Procedure(s):  TOTAL LAPAROSCOPIC HYSTERECTOMY WITH LEFT  SALPINGECTOMY **SPEC POP**. Nausea/Vomiting: Controlled. Postoperative hydration reviewed and adequate. Pain:  Pain Scale 1: FLACC (03/05/18 1321)  Pain Intensity 1: 1 (03/05/18 1321)   Managed. Neurological Status:   Neuro (WDL): Within Defined Limits (03/05/18 1217)   At baseline. Mental Status and Level of Consciousness: Baseline and appropriate for discharge. Pulmonary Status:   O2 Device: Room air (03/05/18 1237)   Adequate oxygenation and airway patent. Complications related to anesthesia: None    Post-anesthesia assessment completed. No concerns. Patient has met all discharge requirements.     Signed By: Royal Nassar MD    March 5, 2018

## 2018-03-05 NOTE — PERIOP NOTES
Transported to holding area for continued care pt c/o pain not relieved by po meds - report to KAHLIL Lorenzo RN

## 2018-03-05 NOTE — PERIOP NOTES
Reviewed PTA medication list with patient/caregiver and patient/caregiver denies any additional medications. Patient admits to having a responsible adult care for them for at least 24 hours after surgery.     Need consent, Rik Kuhn RN in the holding area, made aware. Spoke to Doctor Monae Heath , Anesthesiologist ,  Hx of syncopy yesterday. Pt is seeing neurologist in [facility].

## 2018-03-05 NOTE — PERIOP NOTES
Pts  states you don't know her history - she has trouble urinating after surgery - pt reassured - will attempt to void again

## 2018-03-05 NOTE — PERIOP NOTES
Pts  very condescending - questioning everything that is being said or done with his wife - opportunity for questions - attempting to reassure pt and  of normal processes after hysterectomy

## 2018-03-05 NOTE — PERIOP NOTES
Repeat bladder scan after void of 300 ml - 800 ml - Dr. Margy Ramey notified - states pt can either attempt to void again, or pt can have mejia cath inserted and come to office on Wednesday for removal. Plan discussed with pt and  - pt states would like to try again. Pt's  states he wants to talk to the doctor or to pt advocacy - Nurse Manager ESTEFANIA Black RN notified - Dr. Margy Ramey paged

## 2018-03-05 NOTE — PERIOP NOTES
TRANSFER - OUT REPORT:    Verbal report given to Donte Dalton RN (name) on Rosa Conley  being transferred to phase 2(unit) for routine post - op       Report consisted of patients Situation, Background, Assessment and   Recommendations(SBAR). Information from the following report(s) SBAR, Intake/Output and MAR was reviewed with the receiving nurse. Lines:   Peripheral IV 03/05/18 Right Hand (Active)   Site Assessment Clean, dry, & intact 3/5/2018 12:17 PM   Phlebitis Assessment 0 3/5/2018 12:17 PM   Infiltration Assessment 0 3/5/2018 12:17 PM   Dressing Status Clean, dry, & intact 3/5/2018 12:17 PM   Dressing Type Transparent;Tape 3/5/2018 12:17 PM   Hub Color/Line Status Green; Infusing 3/5/2018 12:17 PM        Opportunity for questions and clarification was provided.       Patient transported with:   [employer]

## 2018-03-05 NOTE — INTERVAL H&P NOTE
H&P Update:  Aleksey Leon was seen and examined. History and physical has been reviewed. The patient has been examined.  There have been no significant clinical changes since the completion of the originally dated History and Physical.    Signed By: Ro Sexton MD     March 5, 2018 10:44 AM

## 2018-03-05 NOTE — H&P (VIEW-ONLY)
PATIENT:  Wilman Douglas  YOB: 1980  DATE:   2018 8:45 AM   VISIT TYPE: Office Visit - GYN        This 40year old female presents for abdominal pain f/u:. History of Present Illness:  1.  abdominal pain f/u:      The symptoms began 4 weeks ago and generally lasts varies. The symptoms are reported as being mild. The symptoms occur constantly. The location is abdomen. The symptoms are described as cramping. Aggravating factors include abdominal pain and rectal bleeding. Relieving factors include none. She states the symptoms are acute and are unchanged. patient presents for ER follow up of abdominal pain and rectal bleeding. states she had a lot of abdominal pain and cramping and was filling the toilet with bright red blood. states she is now on her cycle and having heavy vaginal bleeding. here for pre-op. Past Systemic History    Medical History (Detailed)  Disease Onset Date Comments   breast reduction     gallbladder removal     hernia repairX3       Surgical History/Management (Detailed)  Management Date Comments   Gastric bypass         Medication Reconciliation  Medications reconciled today. Allergies:  Ingredient Reaction Medication Name Comment   NO KNOWN ALLERGIES          Family History  (Detailed)  Relationship Family Member Name  Age at Death Condition Onset Age Cause of Death       Family history of Hypertension  N       Family history of Coronary artery disease  N       Family history of Diabetes mellitus  N       Family history of Stroke  N   Father  Y 72      Mother  N         Social History:  (Detailed)  Preferred language is English. Smoking status: Never smoker. No passive smoke exposure. ALCOHOL  There is a history of alcohol use. Type: Wine. 1 drink consumed occasionally. Last alcoholic drink was yesterday. CAFFEINE  The patient does not use caffeine.       Review of Systems  System Neg/Pos Details   Constitutional Negative Chills, fatigue, fever, malaise, night sweats, weight gain and weight loss. GI Positive Abdominal pain. GI Negative Blood in stool, change in stool pattern, constipation, diarrhea, heartburn, nausea and vomiting.  Negative Dysuria, hematuria, polyuria, urinary frequency, urinary incontinence and urinary retention. Psych Negative Anxiety, depression and insomnia. Integumentary Negative Brittle hair, brittle nails, change in shape/size of mole(s), hair loss, hirsutism, hives, pruritus, rash and skin lesion. Reproductive Positive Dyspareunia. Reproductive Negative Breast discharge, breast lump(s), dysmenorrhea, history of abnormal PAP smear, hot flashes, irregular menses and vaginal discharge. Vital Signs     Height  Time ft in cm Last Measured Height Position   9:37 AM 5.0 6.00 167.64 11/21/2017 Standing     Weight/BSA/BMI  Time lb oz kg Context BMI kg/m2 BSA m2   9:37 .00  66.224  23.56 1.76     Blood Pressure  Time BP mm/Hg Position Side Site Method Cuff Size   9:37 /80          Measured By  Time Measured by   9:37 AM Raj Breeze       Physical Exam  Exam Findings Details   Constitutional Normal Well developed. Neck Exam Normal Palpation - Normal. Thyroid gland - Normal.   Abdomen Normal Anterior palpation -  No rebound. No abdominal tenderness. No hernia. Genitourinary * Obesity limits exam accuracy. Genitourinary Normal Urethral meatus - Normal. External genitalia - Normal. Glands - Normal. Perineum - Normal. Anus - Normal. Vagina - Normal. Cervix - Normal. Uterus - Normal. Adnexa - Normal. Bladder - Normal.   Skin Normal Inspection - Normal.   Spine * Inspection - no abnormality. Psychiatric Normal Oriented to time, place, person and situation. Appropriate mood and affect. Assessment/Plan  # Detail Type Description    1. Assessment Endometriosis (N80.9). Patient Plan Proceed with TLH/Bilateral salpingecotmy.   The procedure was discussed with the patient in detail. She understands that the risks include but are not limited to: Heavy bleeding, need for blood transfusion, infection, injury to internal or adjacent organs, risks associated with anesthesia, wound infection or separation, DVT, pulmonary embolus, pneumonia and death associated with surgery. All questions were answered and the patient wants to proceed. 2. Assessment Menorrhagia (N92.0). 3. Assessment Secondary dysmenorrhea (N94.5).

## 2018-03-05 NOTE — OP NOTES
Outpatient Operative Note     Surgeon(s): John Berrios DO   Pre-operative Diagnosis: Menorrhagia, Dysmenorrhea  Post-operative Diagnosis: Menorrhagia, Dysmenorrhea  Procedure(s) Performed: Total Laparoscopic Hysterectomy with left salpingectomy         Anesthesia: General with ETT  Findings: normal uterus, tubes, left ovary. Absent right ovary, Two small adhesions to the anterior abdominal wall/hernia mesh    Complications: none   Estimated Blood Loss: 100 cc  Specimens: uterus, cervix, and tubes,     Procedure:   Patient was taken to the OR after informed consent had been obtained. She was then placed under general anesthesia, prepped and draped in a sterile fashion. Time out was completed. Attention was then turned to the vagina. The cervix was exposed with a Junior retractor. The cervix was grasped at 12 o'clock position with a single-toothed tenaculum. The cervix was dilated enough to allow the uterine manipulator to be inserted. The [employer] care uterine manipulator was inserted. The uterine balloon was insufflated. Attention was then turned to the abdomen. 0.50% Marcaine with epinephrine was injected into the left upper quadrant and a 5 mm incision was made vertically. A 5mm nonbladed trocar was inserted. High-flow insufflation was initiated after proper intraperitoneal placement was verified. The patient was placed in steep [place]. In the left lower quadrant, Marcaine was injected and a 10 mm nonbladed trocar was inserted. The same procedure was used for a 5 mm trocar in the umbilicus. The ureters were identified bilaterally. The left mesosalpinx was transected with the Harmonic Ace back to the cornua. The ovarian suspensory ligament and round ligament were transected. The left portion of the bladder flap was created. The left uterine artery was transected. Attention was turned to the right side. The right broad ligament was transected back to the right uterine artery.  The right portion of the bladder flap was created. The right uterine artery was transected. The anterior colpotomy was performed followed by the posterior colpotomy. The uteroscal ligaments were transected and the cervix was transected from the vagina. The uterus was delivered through the vaginal incision and left in place to maintain the pneumoperitoneum. The vaginal cuff was closed with the V loc suture device and 2-0 running barbed suture. The abdomen was cleared of all clot and debris. Hemostasis along the right uterine artery and left uterine artery was obtained with bipolar cautery. The instruments were removed from the abdomen. The left lower quadrant fascia was closed with 0 Vicryl interuppted. The uterus was removed from the vagina and sent to pathology. The skin for all three incisions was closed with subcuticlar interrupteds and Derma bond for the skin edges. The patient was awakened and returned to recovery in stable condition.      Nicol Andrews DO

## 2018-03-05 NOTE — PERIOP NOTES
Spoke with Dr. Rosa Maurice - pt voided total amout 500 ml light clear urine - bladder scan 514 ml okay for discharge - will call L&D when pts  returns to call and speak with dr. Rosa Maurice

## 2018-03-05 NOTE — PERIOP NOTES
Phone report to KAHLIL Lorenzo RN - opportunity for questions - notified that anesthesia has not returned call - pt states that Fentanyl does not work  - Dilaudid does.

## 2018-03-06 NOTE — PERIOP NOTES
Patient c/o 10/10 pain. Requesting dilaudid. Spoke with Dr. Gabe Friedman.   New order received-see [facility]

## 2018-06-14 ENCOUNTER — TELEPHONE (OUTPATIENT)
Dept: HEMATOLOGY | Age: 38
End: 2018-06-14

## 2018-06-14 DIAGNOSIS — R10.9 CHRONIC ABDOMINAL PAIN: Primary | ICD-10-CM

## 2018-06-14 DIAGNOSIS — G89.29 CHRONIC ABDOMINAL PAIN: Primary | ICD-10-CM

## 2018-06-14 NOTE — TELEPHONE ENCOUNTER
Returning pts call from earlier re request for referral.  No answer, left VM asking pt to return my call.

## 2018-10-22 ENCOUNTER — APPOINTMENT (OUTPATIENT)
Dept: GENERAL RADIOLOGY | Age: 38
End: 2018-10-22
Attending: EMERGENCY MEDICINE
Payer: OTHER GOVERNMENT

## 2018-10-22 ENCOUNTER — APPOINTMENT (OUTPATIENT)
Dept: VASCULAR SURGERY | Age: 38
End: 2018-10-22
Attending: EMERGENCY MEDICINE
Payer: OTHER GOVERNMENT

## 2018-10-22 ENCOUNTER — HOSPITAL ENCOUNTER (EMERGENCY)
Age: 38
Discharge: HOME | End: 2018-10-22
Attending: EMERGENCY MEDICINE
Payer: OTHER GOVERNMENT

## 2018-10-22 VITALS
SYSTOLIC BLOOD PRESSURE: 105 MMHG | RESPIRATION RATE: 16 BRPM | BODY MASS INDEX: 24.91 KG/M2 | HEIGHT: 66 IN | OXYGEN SATURATION: 99 % | WEIGHT: 155 LBS | TEMPERATURE: 98.1 F | DIASTOLIC BLOOD PRESSURE: 67 MMHG | HEART RATE: 85 BPM

## 2018-10-22 DIAGNOSIS — R07.9 CHEST PAIN, UNSPECIFIED TYPE: Primary | ICD-10-CM

## 2018-10-22 DIAGNOSIS — M79.604 RIGHT LEG PAIN: ICD-10-CM

## 2018-10-22 LAB
ALBUMIN SERPL-MCNC: 4.2 G/DL (ref 3.4–5)
ALBUMIN/GLOB SERPL: 1 {RATIO} (ref 0.8–1.7)
ALP SERPL-CCNC: 74 U/L (ref 45–117)
ALT SERPL-CCNC: 28 U/L (ref 13–56)
ANION GAP SERPL CALC-SCNC: 10 MMOL/L (ref 3–18)
AST SERPL-CCNC: 28 U/L (ref 15–37)
BASOPHILS # BLD: 0.1 K/UL (ref 0–0.1)
BASOPHILS NFR BLD: 1 % (ref 0–2)
BILIRUB SERPL-MCNC: 0.2 MG/DL (ref 0.2–1)
BUN SERPL-MCNC: 10 MG/DL (ref 7–18)
BUN/CREAT SERPL: 11 (ref 12–20)
CALCIUM SERPL-MCNC: 9.2 MG/DL (ref 8.5–10.1)
CHLORIDE SERPL-SCNC: 104 MMOL/L (ref 100–108)
CK MB CFR SERPL CALC: NORMAL % (ref 0–4)
CK MB SERPL-MCNC: <1 NG/ML (ref 5–25)
CK SERPL-CCNC: 74 U/L (ref 26–192)
CO2 SERPL-SCNC: 25 MMOL/L (ref 21–32)
CREAT SERPL-MCNC: 0.93 MG/DL (ref 0.6–1.3)
D DIMER PPP FEU-MCNC: <0.27 UG/ML(FEU)
DIFFERENTIAL METHOD BLD: ABNORMAL
EOSINOPHIL # BLD: 0.1 K/UL (ref 0–0.4)
EOSINOPHIL NFR BLD: 2 % (ref 0–5)
ERYTHROCYTE [DISTWIDTH] IN BLOOD BY AUTOMATED COUNT: 12.2 % (ref 11.6–14.5)
GLOBULIN SER CALC-MCNC: 4.1 G/DL (ref 2–4)
GLUCOSE SERPL-MCNC: 81 MG/DL (ref 74–99)
HCT VFR BLD AUTO: 43.9 % (ref 35–45)
HGB BLD-MCNC: 14.4 G/DL (ref 12–16)
LIPASE SERPL-CCNC: 241 U/L (ref 73–393)
LYMPHOCYTES # BLD: 2.5 K/UL (ref 0.9–3.6)
LYMPHOCYTES NFR BLD: 30 % (ref 21–52)
MCH RBC QN AUTO: 32.2 PG (ref 24–34)
MCHC RBC AUTO-ENTMCNC: 32.8 G/DL (ref 31–37)
MCV RBC AUTO: 98.2 FL (ref 74–97)
MONOCYTES # BLD: 0.6 K/UL (ref 0.05–1.2)
MONOCYTES NFR BLD: 7 % (ref 3–10)
NEUTS SEG # BLD: 5 K/UL (ref 1.8–8)
NEUTS SEG NFR BLD: 60 % (ref 40–73)
PLATELET # BLD AUTO: 339 K/UL (ref 135–420)
PMV BLD AUTO: 9.5 FL (ref 9.2–11.8)
POTASSIUM SERPL-SCNC: 4.2 MMOL/L (ref 3.5–5.5)
PROT SERPL-MCNC: 8.3 G/DL (ref 6.4–8.2)
RBC # BLD AUTO: 4.47 M/UL (ref 4.2–5.3)
SODIUM SERPL-SCNC: 139 MMOL/L (ref 136–145)
TROPONIN I SERPL-MCNC: <0.02 NG/ML (ref 0–0.06)
WBC # BLD AUTO: 8.4 K/UL (ref 4.6–13.2)

## 2018-10-22 PROCEDURE — 85379 FIBRIN DEGRADATION QUANT: CPT | Performed by: EMERGENCY MEDICINE

## 2018-10-22 PROCEDURE — 93971 EXTREMITY STUDY: CPT

## 2018-10-22 PROCEDURE — 80053 COMPREHEN METABOLIC PANEL: CPT | Performed by: EMERGENCY MEDICINE

## 2018-10-22 PROCEDURE — 99283 EMERGENCY DEPT VISIT LOW MDM: CPT

## 2018-10-22 PROCEDURE — 82550 ASSAY OF CK (CPK): CPT | Performed by: EMERGENCY MEDICINE

## 2018-10-22 PROCEDURE — 71045 X-RAY EXAM CHEST 1 VIEW: CPT

## 2018-10-22 PROCEDURE — 93005 ELECTROCARDIOGRAM TRACING: CPT

## 2018-10-22 PROCEDURE — 74011250637 HC RX REV CODE- 250/637: Performed by: EMERGENCY MEDICINE

## 2018-10-22 PROCEDURE — 85025 COMPLETE CBC W/AUTO DIFF WBC: CPT | Performed by: EMERGENCY MEDICINE

## 2018-10-22 PROCEDURE — 83690 ASSAY OF LIPASE: CPT | Performed by: EMERGENCY MEDICINE

## 2018-10-22 RX ORDER — FLUOXETINE HYDROCHLORIDE 20 MG/1
20 CAPSULE ORAL DAILY
COMMUNITY

## 2018-10-22 RX ORDER — HYDROCODONE BITARTRATE AND ACETAMINOPHEN 5; 325 MG/1; MG/1
1 TABLET ORAL
Status: COMPLETED | OUTPATIENT
Start: 2018-10-22 | End: 2018-10-22

## 2018-10-22 RX ORDER — ONDANSETRON 4 MG/1
4 TABLET, FILM COATED ORAL
COMMUNITY

## 2018-10-22 RX ORDER — PREGABALIN 150 MG/1
150 CAPSULE ORAL 2 TIMES DAILY
COMMUNITY

## 2018-10-22 RX ORDER — MAG HYDROX/ALUMINUM HYD/SIMETH 200-200-20
30 SUSPENSION, ORAL (FINAL DOSE FORM) ORAL ONCE
Status: COMPLETED | OUTPATIENT
Start: 2018-10-22 | End: 2018-10-22

## 2018-10-22 RX ORDER — MAG HYDROX/ALUMINUM HYD/SIMETH 200-200-20
30 SUSPENSION, ORAL (FINAL DOSE FORM) ORAL
Qty: 354 ML | Refills: 0 | Status: SHIPPED | OUTPATIENT
Start: 2018-10-22

## 2018-10-22 RX ORDER — HYDROCODONE BITARTRATE AND ACETAMINOPHEN 5; 325 MG/1; MG/1
1 TABLET ORAL
Qty: 12 TAB | Refills: 0 | Status: SHIPPED | OUTPATIENT
Start: 2018-10-22 | End: 2018-10-25

## 2018-10-22 RX ADMIN — ALUMINUM HYDROXIDE, MAGNESIUM HYDROXIDE, AND SIMETHICONE 30 ML: 200; 200; 20 SUSPENSION ORAL at 19:06

## 2018-10-22 RX ADMIN — HYDROCODONE BITARTRATE AND ACETAMINOPHEN 1 TABLET: 5; 325 TABLET ORAL at 19:06

## 2018-10-22 NOTE — ED PROVIDER NOTES
EMERGENCY DEPARTMENT HISTORY AND PHYSICAL EXAM 
 
Date: 10/22/2018 Patient Name: Gertrude Sanders History of Presenting Illness Chief Complaint Patient presents with  Chest Pain  Leg Pain History Provided By: Patient Chief Complaint: right leg pain Duration: 2 Days Timing:  Gradual and Worsening Location: anterior right shin with some pain on the calf Severity: 7 out of 10 Associated Symptoms:  bump on right leg, aching non radiating chest pain since yesterday Additional History (Context):  
3:50 PM 
Gertrude Sanders is a 41 y.o. female with PMHx of Hypotension, thromboembolus, PUD, and anemia who presents to the emergency department C/O right leg pain on anterior shin onset 2 days ago with worsening yesterday. Associated sxs include bump on right leg and aching non radiating chest pain since yesterday evening while sitting down which is worse with moving or deep inspiration. Pt states she had a blood clot in her portal vein last year and pt has been on Xarelto 20 mg daily for it. Pt states that she forgot to take her medication for 2-3 days last week. PSHx of cystectomy, knee arthroscopy, vascular surgery, cholecystectomy, gastric bypass, hernia repair, and oophorectomy. Pt endorses being a non smoker, a non EtOH user, and a non recreational drug user. Pt denies any other sxs or complaints. PCP: Nori Jacinto PA-C Current Facility-Administered Medications Medication Dose Route Frequency Provider Last Rate Last Dose  
 HYDROcodone-acetaminophen (NORCO) 5-325 mg per tablet 1 Tab  1 Tab Oral NOW Kimberly Olivier MD      
 alum-mag hydroxide-[facility]) oral suspension 30 mL  30 mL Oral ONCE Kimberly Olivier MD      
 
Current Outpatient Medications Medication Sig Dispense Refill  pregabalin (LYRICA) 150 mg capsule Take 150 mg by mouth two (2) times a day.  FLUoxetine (PROZAC) 20 mg capsule Take 20 mg by mouth daily.  ondansetron hcl (ZOFRAN) 4 mg tablet Take 4 mg by mouth every eight (8) hours as needed for Nausea.  HYDROcodone-acetaminophen (NORCO) 5-325 mg per tablet Take 1 Tab by mouth every six (6) hours as needed for Pain for up to 3 days. Max Daily Amount: 4 Tabs. 12 Tab 0  
 alum-mag hydroxide-simeth (MYLANTA) 200-200-20 mg/5 mL susp Take 30 mL by mouth three (3) times daily (with meals). 354 mL 0  
 multivitamin, tx-iron-ca-min (THERA-M W/ IRON) 9 mg iron-400 mcg tab tablet Take 1 Tab by mouth daily.  rivaroxaban (XARELTO) 20 mg tab tablet Take 1 Tab by mouth daily. Start from 9/30/2017 (Patient taking differently: Take 20 mg by mouth daily (with breakfast). Start from 9/30/2017  Indications: PREVENTION OF DEEP VEIN THROMBOSIS RECURRENCE) 1 Tab 0  
 topiramate (TOPAMAX) 100 mg tablet Take 100 mg by mouth nightly. Indications: MIGRAINE PREVENTION    
 dicyclomine (BENTYL) 10 mg capsule Take 20 mg by mouth three (3) times daily.  midodrine (PROAMITINE) 5 mg tablet Take 10 mg by mouth three (3) times daily. Indications: Symptomatic Orthostatic Hypotension Past History Past Medical History: 
Past Medical History:  
Diagnosis Date  Abdominal pain  Anemia   
 iron infusions-every 6 months  Arthritis   
 osteo-knee  Chronic pain   
 abdominal pain  Coagulation disorder (HCC)   
 anemia  H/O blood clots  Hypotension  Ill-defined condition \"low bp\"  Ill-defined condition \"abd blood clot\"  PUD (peptic ulcer disease) 2007 H/O  Thromboembolus ([address] DVT x 2 possibly due to numerous surgeries mesenteric vein Past Surgical History: 
Past Surgical History:  
Procedure Laterality Date  BREAST SURGERY PROCEDURE UNLISTED    
 breast reduction  HX BREAST BIOPSY Left  HX CHOLECYSTECTOMY  HX CYSTECTOMY  HX GASTRIC BYPASS [address]  2011,  2015,   2016  HX KNEE ARTHROSCOPY Left 2013  HX OOPHORECTOMY Right as well as tube with D&C  
 HX OTHER SURGICAL  2001  
 pilonidal cyst  
 VASCULAR SURGERY PROCEDURE UNLIST    
 intervential radiologist fixed blood cot in the abdomen Family History: 
History reviewed. No pertinent family history. Social History: 
Social History Tobacco Use  Smoking status: Never Smoker  Smokeless tobacco: Never Used Substance Use Topics  Alcohol use: Yes Comment: 4-6 times a year  Drug use: No  
 
 
Allergies: 
No Known Allergies Review of Systems Review of Systems Constitutional: Negative for activity change, appetite change, fever and unexpected weight change. HENT: Negative for congestion and sore throat. Eyes: Negative for pain and redness. Respiratory: Negative for cough and shortness of breath. Cardiovascular: Positive for chest pain. Negative for palpitations. Gastrointestinal: Negative for abdominal pain, diarrhea, nausea and vomiting. Endocrine: Negative for polydipsia and polyuria. Genitourinary: Negative for difficulty urinating and dysuria. Musculoskeletal: Negative for back pain and neck pain. (+) Leg pain Skin: Negative for pallor and rash. (+) Bump on leg Neurological: Negative for headaches. All other systems reviewed and are negative. Physical Exam  
 
Vitals:  
 10/22/18 1540 BP: 105/67 Pulse: 85 Resp: 16 Temp: 98.1 °F (36.7 °C) SpO2: 99% Weight: 70.3 kg (155 lb) Height: 5' 6\" (1.676 m) Physical Exam  
Constitutional: She is oriented to person, place, and time. She appears well-developed and well-nourished. HENT:  
Head: Normocephalic and atraumatic. Right Ear: External ear normal.  
Left Ear: External ear normal.  
Nose: Nose normal.  
Mouth/Throat: Oropharynx is clear and moist.  
Eyes: Conjunctivae and EOM are normal. Pupils are equal, round, and reactive to light. Neck: Normal range of motion. Neck supple. No JVD present. No tracheal deviation present. Cardiovascular: Normal rate, regular rhythm, normal heart sounds and intact distal pulses. Exam reveals no gallop and no friction rub. No murmur heard. Pulmonary/Chest: Effort normal and breath sounds normal. No respiratory distress. She has no wheezes. She has no rales. Abdominal: Soft. Bowel sounds are normal. She exhibits no distension and no mass. There is no tenderness. There is no rebound and no guarding. Musculoskeletal: Normal range of motion. She exhibits tenderness. She exhibits no edema. Right calf tenderness and right anterior shin tenderness on lower third. Neurological: She is alert and oriented to person, place, and time. She has normal reflexes. No cranial nerve deficit. Skin: Skin is warm and dry. No rash noted. No ecchymosis. Psychiatric: She has a normal mood and affect. Her behavior is normal.  
Nursing note and vitals reviewed. Diagnostic Study Results Labs - Recent Results (from the past 12 hour(s)) EKG, 12 LEAD, INITIAL Collection Time: 10/22/18  4:08 PM  
Result Value Ref Range Ventricular Rate 75 BPM  
 Atrial Rate 75 BPM  
 P-R Interval 120 ms QRS Duration 80 ms  
 Q-T Interval 388 ms QTC Calculation (Bezet) 433 ms Calculated P Axis 40 degrees Calculated R Axis 37 degrees Calculated T Axis 37 degrees Diagnosis Normal sinus rhythm Normal ECG When compared with ECG of 15-FEB-2018 12:39, 
T wave inversion no longer evident in Inferior leads Nonspecific T wave abnormality has replaced inverted T waves in Anterior  
leads METABOLIC PANEL, COMPREHENSIVE Collection Time: 10/22/18  4:25 PM  
Result Value Ref Range Sodium 139 136 - 145 mmol/L Potassium 4.2 3.5 - 5.5 mmol/L Chloride 104 100 - 108 mmol/L  
 CO2 25 21 - 32 mmol/L Anion gap 10 3.0 - 18 mmol/L Glucose 81 74 - 99 mg/dL BUN 10 7.0 - 18 MG/DL  Creatinine 0.93 0.6 - 1.3 MG/DL  
 BUN/Creatinine ratio 11 (L) 12 - 20    
 GFR est AA >60 >60 ml/min/1.73m2 GFR est non-AA >60 >60 ml/min/1.73m2 Calcium 9.2 8.5 - 10.1 MG/DL Bilirubin, total 0.2 0.2 - 1.0 MG/DL  
 ALT (SGPT) 28 13 - 56 U/L  
 AST (SGOT) 28 15 - 37 U/L Alk. phosphatase 74 45 - 117 U/L Protein, total 8.3 (H) 6.4 - 8.2 g/dL Albumin 4.2 3.4 - 5.0 g/dL Globulin 4.1 (H) 2.0 - 4.0 g/dL A-G Ratio 1.0 0.8 - 1.7 LIPASE Collection Time: 10/22/18  4:25 PM  
Result Value Ref Range Lipase 241 73 - 393 U/L  
CBC WITH AUTOMATED DIFF Collection Time: 10/22/18  4:25 PM  
Result Value Ref Range WBC 8.4 4.6 - 13.2 K/uL  
 RBC 4.47 4.20 - 5.30 M/uL  
 HGB 14.4 12.0 - 16.0 g/dL HCT 43.9 35.0 - 45.0 % MCV 98.2 (H) 74.0 - 97.0 FL  
 MCH 32.2 24.0 - 34.0 PG  
 MCHC 32.8 31.0 - 37.0 g/dL  
 RDW 12.2 11.6 - 14.5 % PLATELET 176 934 - 198 K/uL MPV 9.5 9.2 - 11.8 FL  
 NEUTROPHILS 60 40 - 73 % LYMPHOCYTES 30 21 - 52 % MONOCYTES 7 3 - 10 % EOSINOPHILS 2 0 - 5 % BASOPHILS 1 0 - 2 %  
 ABS. NEUTROPHILS 5.0 1.8 - 8.0 K/UL  
 ABS. LYMPHOCYTES 2.5 0.9 - 3.6 K/UL  
 ABS. MONOCYTES 0.6 0.05 - 1.2 K/UL  
 ABS. EOSINOPHILS 0.1 0.0 - 0.4 K/UL  
 ABS. BASOPHILS 0.1 0.0 - 0.1 K/UL  
 DF AUTOMATED    
D DIMER Collection Time: 10/22/18  4:25 PM  
Result Value Ref Range D DIMER <0.27 <0.46 ug/ml(FEU) CARDIAC PANEL,(CK, CKMB & TROPONIN) Collection Time: 10/22/18  4:25 PM  
Result Value Ref Range CK 74 26 - 192 U/L  
 CK - MB <1.0 <3.6 ng/ml CK-MB Index  0.0 - 4.0 % CALCULATION NOT PERFORMED WHEN RESULT IS BELOW LINEAR LIMIT Troponin-I, Qt. <0.02 0.00 - 0.06 NG/ML Radiologic Studies - Duplex results: · No evidence of deep vein thrombosis in the right lower extremity. · No evidence of deep vein thrombosis in the contralateral left common femoral vein. CT Results  (Last 48 hours) None CXR Results  (Last 48 hours) 10/22/18 1606  XR CHEST PORT Final result Impression:  Impression: No radiographic evidence of an acute abnormality. Narrative:  Chest, single view Indication: Chest pain, shortness of breath Comparison: None Findings:  Portable upright AP view of the chest was obtained. The  
cardiomediastinal silhouette is within normal limits. The pulmonary vasculature  
is unremarkable. Lung parenchyma is well aerated, without focal consolidation. No pleural effusion nor pneumothorax. No acute osseous abnormality. Medications given in the ED- Medications HYDROcodone-acetaminophen (NORCO) 5-325 mg per tablet 1 Tab (not administered) alum-mag hydroxide-simeth (MYLANTA) oral suspension 30 mL (not administered) Medical Decision Making I am the first provider for this patient. I reviewed the vital signs, available nursing notes, past medical history, past surgical history, family history and social history. Vital Signs-Reviewed the patient's vital signs. Pulse Oximetry Analysis - 99% on room air. EKG interpretation: (Preliminary) 4:08 PM  
NSR at 75 bpm without acute changes. EKG read by Tanvi Saeed MD at 5:10 PM  
 
Records Reviewed: Nursing Notes and Old Medical Records Provider Notes (Medical Decision Making): DDx: PE, ACS, musculoskeletal pain, CHF, PNA. Procedures: 
Procedures ED Course:  
Discussion: Pt presents with c/o CP and right leg pain with prior DVT hx. Pt had c/o CP since yesterday. Pt was stable in the ED. ECG and Troponin showed no evidence of ACS. D-Dimer nl, doubt PE or DVT. CXR and labs unremarkable. Right leg duplex doppler showed no evidence of DVT. Pt was given NORCO and Mylanta for pain. CP cause unclear. Heart score=2. Pt has chronic abd pain s/p gastric bypass which may be related to esophageal reflux. Right leg pain showed no evidence of DVT or other etiology with nl Neurovascular exam. Pt advised to see her PCP for further evaluation of her right leg and chest pain. 3:50 PM Initial assessment performed. The patients presenting problems have been discussed, and they are in agreement with the care plan formulated and outlined with them. I have encouraged them to ask questions as they arise throughout their visit. 6:23 PM Spoke with pt. Pt is still c/o continued burning. Pt heart score is 2. Pt is to f/u with PCP tomorrow. Will give Mylanta and Norco. Pt states her mother had a MI in her 45s. Diagnosis and Disposition DISCHARGE NOTE: 
5:51 PM 
David Coppola's  results have been reviewed with her. She has been counseled regarding her diagnosis, treatment, and plan. She verbally conveys understanding and agreement of the signs, symptoms, diagnosis, treatment and prognosis and additionally agrees to follow up as discussed. She also agrees with the care-plan and conveys that all of her questions have been answered. I have also provided discharge instructions for her that include: educational information regarding their diagnosis and treatment, and list of reasons why they would want to return to the ED prior to their follow-up appointment, should her condition change. She has been provided with education for proper emergency department utilization. CLINICAL IMPRESSION: 
 
1. Chest pain, unspecified type 2. Right leg pain PLAN: 
1. D/C Home 2. Current Discharge Medication List  
  
START taking these medications Details HYDROcodone-acetaminophen (NORCO) 5-325 mg per tablet Take 1 Tab by mouth every six (6) hours as needed for Pain for up to 3 days. Max Daily Amount: 4 Tabs. Qty: 12 Tab, Refills: 0 Associated Diagnoses: Right leg pain; Chest pain, unspecified type  
  
alum-mag hydroxide-simeth (MYLANTA) 200-200-20 mg/5 mL susp Take 30 mL by mouth three (3) times daily (with meals). Qty: 354 mL, Refills: 0  
  
  
 
3. Follow-up Information Follow up With Specialties Details Why Contact Info Hever Plaza PA-C Physician Assistant Schedule an appointment as soon as possible for a visit For Primary Care Follow Up 3 [facility] [address] 
[phone] [facility] EMERGENCY DEPT Emergency Medicine Go to If symptoms worsen, As needed 2 Helena Farah 
[address] [facility] [id number] [phone]  
  
 
_______________________________ Attestations: This note is prepared by Taras Bethea, acting as Scribe for Jennifer Naranjo MD. 
 
Jennifer Naranjo MD:  The scribe's documentation has been prepared under my direction and personally reviewed by me in its entirety. I confirm that the note above accurately reflects all work, treatment, procedures, and medical decision making performed by me. 
_______________________________

## 2018-10-22 NOTE — ED TRIAGE NOTES
Triage: pt complains of right LE pain and swelling that began 3 days. Chest pain that began yesterday. Pt states that she feels like she has an irregular heart beat, but states that she normally has palpitations. Pt states that she missed 2 or 3 doses of Xarelto last week.

## 2018-11-26 LAB
ATRIAL RATE: 75 BPM
CALCULATED P AXIS, ECG09: 40 DEGREES
CALCULATED R AXIS, ECG10: 37 DEGREES
CALCULATED T AXIS, ECG11: 37 DEGREES
DIAGNOSIS, 93000: NORMAL
P-R INTERVAL, ECG05: 120 MS
Q-T INTERVAL, ECG07: 388 MS
QRS DURATION, ECG06: 80 MS
QTC CALCULATION (BEZET), ECG08: 433 MS
VENTRICULAR RATE, ECG03: 75 BPM

## 2019-01-04 ENCOUNTER — HOSPITAL ENCOUNTER (OUTPATIENT)
Dept: LAB | Age: 39
Discharge: HOME | End: 2019-01-04
Payer: OTHER GOVERNMENT

## 2019-01-04 LAB
ALBUMIN SERPL-MCNC: 3.8 G/DL (ref 3.4–5)
ALBUMIN/GLOB SERPL: 1 {RATIO} (ref 0.8–1.7)
ALP SERPL-CCNC: 79 U/L (ref 45–117)
ALT SERPL-CCNC: 33 U/L (ref 13–56)
ANION GAP SERPL CALC-SCNC: 7 MMOL/L (ref 3–18)
AST SERPL-CCNC: 27 U/L (ref 15–37)
BASOPHILS # BLD: 0.1 K/UL (ref 0–0.1)
BASOPHILS NFR BLD: 1 % (ref 0–2)
BILIRUB DIRECT SERPL-MCNC: 0.1 MG/DL (ref 0–0.2)
BILIRUB SERPL-MCNC: 0.2 MG/DL (ref 0.2–1)
BUN SERPL-MCNC: 8 MG/DL (ref 7–18)
BUN/CREAT SERPL: 8
CALCIUM SERPL-MCNC: 8.8 MG/DL (ref 8.5–10.1)
CHLORIDE SERPL-SCNC: 109 MMOL/L (ref 100–108)
CO2 SERPL-SCNC: 26 MMOL/L (ref 21–32)
CREAT SERPL-MCNC: 1 MG/DL (ref 0.6–1.3)
DIFFERENTIAL METHOD BLD: NORMAL
EOSINOPHIL # BLD: 0.3 K/UL (ref 0–0.4)
EOSINOPHIL NFR BLD: 3 % (ref 0–5)
ERYTHROCYTE [DISTWIDTH] IN BLOOD BY AUTOMATED COUNT: 13.4 % (ref 11.6–14.5)
GLOBULIN SER CALC-MCNC: 3.7 G/DL (ref 2–4)
GLUCOSE SERPL-MCNC: 86 MG/DL (ref 74–99)
HCT VFR BLD AUTO: 41.5 % (ref 35–45)
HGB BLD-MCNC: 13.4 G/DL (ref 12–16)
INR PPP: 1.2 (ref 0.8–1.2)
LYMPHOCYTES # BLD: 2.8 K/UL (ref 0.9–3.6)
LYMPHOCYTES NFR BLD: 36 % (ref 21–52)
MCH RBC QN AUTO: 31.2 PG (ref 24–34)
MCHC RBC AUTO-ENTMCNC: 32.3 G/DL (ref 31–37)
MCV RBC AUTO: 96.5 FL (ref 74–97)
MONOCYTES # BLD: 0.7 K/UL (ref 0.05–1.2)
MONOCYTES NFR BLD: 8 % (ref 3–10)
NEUTS SEG # BLD: 4.1 K/UL (ref 1.8–8)
NEUTS SEG NFR BLD: 52 % (ref 40–73)
PLATELET # BLD AUTO: 328 K/UL (ref 135–420)
PMV BLD AUTO: 9.7 FL (ref 9.2–11.8)
POTASSIUM SERPL-SCNC: 3.8 MMOL/L (ref 3.5–5.5)
PROT SERPL-MCNC: 7.5 G/DL (ref 6.4–8.2)
PROTHROMBIN TIME: 14.5 SEC (ref 11.5–15.2)
RBC # BLD AUTO: 4.3 M/UL (ref 4.2–5.3)
SODIUM SERPL-SCNC: 142 MMOL/L (ref 136–145)
WBC # BLD AUTO: 7.9 K/UL (ref 4.6–13.2)

## 2019-01-04 PROCEDURE — 85025 COMPLETE CBC W/AUTO DIFF WBC: CPT

## 2019-01-04 PROCEDURE — 80076 HEPATIC FUNCTION PANEL: CPT

## 2019-01-04 PROCEDURE — 85610 PROTHROMBIN TIME: CPT

## 2019-01-04 PROCEDURE — 36415 COLL VENOUS BLD VENIPUNCTURE: CPT

## 2019-01-04 PROCEDURE — 80048 BASIC METABOLIC PNL TOTAL CA: CPT

## 2019-01-14 RX ORDER — RANOLAZINE 500 MG/1
TABLET, EXTENDED RELEASE ORAL 2 TIMES DAILY
COMMUNITY
End: 2019-01-15

## 2019-01-14 RX ORDER — DIVALPROEX SODIUM 500 MG/1
500 TABLET, EXTENDED RELEASE ORAL DAILY
Status: ON HOLD | COMMUNITY
End: 2019-01-15 | Stop reason: CLARIF

## 2019-01-14 RX ORDER — ATORVASTATIN CALCIUM 20 MG/1
20 TABLET, FILM COATED ORAL DAILY
COMMUNITY
End: 2019-01-15

## 2019-01-14 RX ORDER — BUTALBITAL, ACETAMINOPHEN AND CAFFEINE 50; 325; 40 MG/1; MG/1; MG/1
1 TABLET ORAL
COMMUNITY

## 2019-01-14 RX ORDER — ACETAMINOPHEN 500 MG
1000 TABLET ORAL
COMMUNITY

## 2019-01-14 RX ORDER — ASPIRIN 81 MG/1
TABLET ORAL DAILY
COMMUNITY
End: 2019-01-15

## 2019-01-14 RX ORDER — METOPROLOL TARTRATE 25 MG/1
12.5 TABLET, FILM COATED ORAL 2 TIMES DAILY
COMMUNITY
End: 2019-01-15

## 2019-01-14 RX ORDER — DULOXETIN HYDROCHLORIDE 60 MG/1
60 CAPSULE, DELAYED RELEASE ORAL DAILY
COMMUNITY

## 2019-01-14 RX ORDER — OXYCODONE AND ACETAMINOPHEN 10; 325 MG/1; MG/1
TABLET ORAL
COMMUNITY

## 2019-01-15 ENCOUNTER — HOSPITAL ENCOUNTER (OUTPATIENT)
Age: 39
Setting detail: OUTPATIENT SURGERY
Discharge: HOME | End: 2019-01-15
Attending: INTERNAL MEDICINE | Admitting: INTERNAL MEDICINE
Payer: OTHER GOVERNMENT

## 2019-01-15 VITALS
RESPIRATION RATE: 16 BRPM | DIASTOLIC BLOOD PRESSURE: 69 MMHG | BODY MASS INDEX: 26.03 KG/M2 | TEMPERATURE: 97.8 F | OXYGEN SATURATION: 100 % | SYSTOLIC BLOOD PRESSURE: 107 MMHG | HEART RATE: 74 BPM | HEIGHT: 66 IN | WEIGHT: 162 LBS

## 2019-01-15 DIAGNOSIS — R07.9 CHEST PAIN: ICD-10-CM

## 2019-01-15 PROCEDURE — 77030008543 HC TBNG MON PRSS MRTM -A: Performed by: INTERNAL MEDICINE

## 2019-01-15 PROCEDURE — 99153 MOD SED SAME PHYS/QHP EA: CPT | Performed by: INTERNAL MEDICINE

## 2019-01-15 PROCEDURE — 74011250636 HC RX REV CODE- 250/636: Performed by: INTERNAL MEDICINE

## 2019-01-15 PROCEDURE — 99152 MOD SED SAME PHYS/QHP 5/>YRS: CPT | Performed by: INTERNAL MEDICINE

## 2019-01-15 PROCEDURE — 77030029997 HC DEV COM RDL R BND TELE -B: Performed by: INTERNAL MEDICINE

## 2019-01-15 PROCEDURE — 93458 L HRT ARTERY/VENTRICLE ANGIO: CPT | Performed by: INTERNAL MEDICINE

## 2019-01-15 PROCEDURE — 74011636320 HC RX REV CODE- 636/320: Performed by: INTERNAL MEDICINE

## 2019-01-15 PROCEDURE — 74011000250 HC RX REV CODE- 250: Performed by: INTERNAL MEDICINE

## 2019-01-15 PROCEDURE — 74011250636 HC RX REV CODE- 250/636

## 2019-01-15 PROCEDURE — C1769 GUIDE WIRE: HCPCS | Performed by: INTERNAL MEDICINE

## 2019-01-15 PROCEDURE — 77030013797 HC KT TRNSDUC PRSSR EDWD -A: Performed by: INTERNAL MEDICINE

## 2019-01-15 PROCEDURE — 77030004522 HC CATH ANGI DX EXPO BSC -A: Performed by: INTERNAL MEDICINE

## 2019-01-15 PROCEDURE — C1894 INTRO/SHEATH, NON-LASER: HCPCS | Performed by: INTERNAL MEDICINE

## 2019-01-15 RX ORDER — SODIUM CHLORIDE 9 MG/ML
75 INJECTION, SOLUTION INTRAVENOUS CONTINUOUS
Status: DISCONTINUED | OUTPATIENT
Start: 2019-01-15 | End: 2019-01-15 | Stop reason: HOSPADM

## 2019-01-15 RX ORDER — MIDAZOLAM HYDROCHLORIDE 1 MG/ML
INJECTION, SOLUTION INTRAMUSCULAR; INTRAVENOUS AS NEEDED
Status: DISCONTINUED | OUTPATIENT
Start: 2019-01-15 | End: 2019-01-15 | Stop reason: HOSPADM

## 2019-01-15 RX ORDER — SODIUM CHLORIDE 0.9 % (FLUSH) 0.9 %
5-40 SYRINGE (ML) INJECTION EVERY 8 HOURS
Status: DISCONTINUED | OUTPATIENT
Start: 2019-01-15 | End: 2019-01-15 | Stop reason: HOSPADM

## 2019-01-15 RX ORDER — VERAPAMIL HYDROCHLORIDE 2.5 MG/ML
INJECTION, SOLUTION INTRAVENOUS AS NEEDED
Status: DISCONTINUED | OUTPATIENT
Start: 2019-01-15 | End: 2019-01-15 | Stop reason: HOSPADM

## 2019-01-15 RX ORDER — SODIUM CHLORIDE 0.9 % (FLUSH) 0.9 %
5-40 SYRINGE (ML) INJECTION AS NEEDED
Status: DISCONTINUED | OUTPATIENT
Start: 2019-01-15 | End: 2019-01-15 | Stop reason: HOSPADM

## 2019-01-15 RX ORDER — FENTANYL CITRATE 50 UG/ML
INJECTION, SOLUTION INTRAMUSCULAR; INTRAVENOUS AS NEEDED
Status: DISCONTINUED | OUTPATIENT
Start: 2019-01-15 | End: 2019-01-15 | Stop reason: HOSPADM

## 2019-01-15 RX ORDER — HEPARIN SODIUM 1000 [USP'U]/ML
INJECTION, SOLUTION INTRAVENOUS; SUBCUTANEOUS AS NEEDED
Status: DISCONTINUED | OUTPATIENT
Start: 2019-01-15 | End: 2019-01-15 | Stop reason: HOSPADM

## 2019-01-15 RX ADMIN — SODIUM CHLORIDE 75 ML/HR: 900 INJECTION, SOLUTION INTRAVENOUS at 08:54

## 2019-01-15 NOTE — PROGRESS NOTES
Cardiac Cath Lab:  Pre Procedure Chart Check Patients chart was accessed and reviewed for possible and/or scheduled procedure. Creatinine Clearance: 
Serum creatinine: 1 mg/dL 01/04/19 1222 Estimated creatinine clearance: 78.6 mL/min Total Contrast  Load: 
3 x estimated clearance amount=  235ml 75% of Contrast Load: 0.75 x Total Contrast Load=    176ml No results for input(s): WBC, RBC, HCT, HGB, PLT, INR, APTT, PTP, NA, K, BUN, CREA, GFRAA, GFRNA, CA, MAG, CPK, CKMB, CKNDX, CKND1, TROPT, TROIQ, BNPP, BNP, HCTEXT, HGBEXT, PLTEXT in the last 72 hours. No lab exists for component: DDIMER, GLUCOSE, INREXT 
 
BMI: Body mass index is 25.82 kg/m². ALLERGIES: No Known Allergies Lines: 
  
  
  
  
 
History: 
 
Past Medical History:  
Diagnosis Date  Abdominal pain  Anemia   
 iron infusions-every 6 months  Arthritis   
 osteo-knee  Autoimmune disease ([facility] [address]   
 fibromylgia  Chronic pain   
 abdominal pain  Coagulation disorder (HCC)   
 anemia  H/O blood clots  Hypotension  Ill-defined condition \"low bp\"  Ill-defined condition \"abd blood clot\"  Ill-defined condition   
 neuro cardiogenic syncope  PUD (peptic ulcer disease) 2007 H/O  Thromboembolus ([address] DVT x 2 possibly due to numerous surgeries mesenteric vein Past Surgical History:  
Procedure Laterality Date  BREAST SURGERY PROCEDURE UNLISTED    
 breast reduction  HX BREAST BIOPSY Left  HX CHOLECYSTECTOMY  HX CYSTECTOMY  HX GASTRIC BYPASS  2008  HX GI    
 choley [address] [facility]  2011,  2015,   2016  HX KNEE ARTHROSCOPY Left 2013  HX OOPHORECTOMY Right   
 as well as tube with D&C  
 HX OTHER SURGICAL  2001  
 pilonidal cyst  
 VASCULAR SURGERY PROCEDURE UNLIST    
 intervential radiologist fixed blood cot in the abdomen Patient Active Problem List  
Diagnosis Code  H/O gastric bypass Z98.84  Chronic abdominal pain R10.9, G89.29  
  Migraine headache G43.909  Neurocardiogenic syncope R55  Superior mesenteric vein thrombosis A95  
 H/O umbilical hernia repair O35.532, Z87.19  
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

## 2019-01-15 NOTE — DISCHARGE SUMMARY
Discharge Summary    Patient: Keturah Chester MRN: [id number]  CSN: [id number]    YOB: 1980  Age: 41 y.o. Sex: female    DOA: 1/15/2019 LOS:  LOS: 0 days   Discharge Date:      Primary Care Provider:  Luna Hogan PA-C    Admission Diagnoses: Angina class II, Abnormal stress echocardiogram    Discharge Diagnoses:  Mid sternal chest pain  Problem List as of 1/15/2019 Date Reviewed: 3/10/2018          Codes Class Noted - Resolved    Mesenteric varices ICD-10-CM: I86.8  ICD-9-CM: 456.8  9/28/2017 - Present        H/O gastric bypass ICD-10-CM: Z98.84  ICD-9-CM: V45.86  7/15/2017 - Present    Overview Signed 7/15/2017  8:12 PM by Deborah Oakley MD     2008             Chronic abdominal pain ICD-10-CM: R10.9, G89.29  ICD-9-CM: 789.00, 338.29  7/15/2017 - Present        Migraine headache ICD-10-CM: G43.909  ICD-9-CM: 346.90  7/15/2017 - Present        Neurocardiogenic syncope ICD-10-CM: R55  ICD-9-CM: 780.2  7/15/2017 - Present        Superior mesenteric vein thrombosis ICD-10-CM: X79  ICD-9-CM: 557.0  7/15/2017 - Present        H/O umbilical hernia repair FIDENCIO-82-YC: Z98.890, Z87.19  ICD-9-CM: V15.29  7/15/2017 - Present              Discharge Medications:     Current Discharge Medication List      CONTINUE these medications which have CHANGED    Details   rivaroxaban (XARELTO) 20 mg tab tablet Take 1 Tab by mouth daily (with dinner). Start from 9/30/2017  Qty: 1 Tab, Refills: 0         CONTINUE these medications which have NOT CHANGED    Details   butalbital-acetaminophen-caffeine (FIORICET, ESGIC) -40 mg per tablet Take 1 Tab by mouth every six (6) hours as needed. DULoxetine (CYMBALTA) 60 mg capsule Take 60 mg by mouth daily. oxyCODONE-acetaminophen (PERCOCET)  mg per tablet Take  by mouth every four (4) hours as needed for Pain. acetaminophen (TYLENOL EXTRA STRENGTH) 500 mg tablet Take 1,000 mg by mouth every six (6) hours as needed for Pain.       pregabalin (LYRICA) 150 mg capsule Take 150 mg by mouth two (2) times a day. FLUoxetine (PROZAC) 20 mg capsule Take 20 mg by mouth daily. ondansetron hcl (ZOFRAN) 4 mg tablet Take 4 mg by mouth every eight (8) hours as needed for Nausea. multivitamin, tx-iron-ca-min (THERA-M W/ IRON) 9 mg iron-400 mcg tab tablet Take 1 Tab by mouth daily. topiramate (TOPAMAX) 100 mg tablet Take 100 mg by mouth two (2) times a day. dicyclomine (BENTYL) 10 mg capsule Take 20 mg by mouth three (3) times daily. midodrine (PROAMITINE) 5 mg tablet Take 10 mg by mouth three (3) times daily. Indications: Symptomatic Orthostatic Hypotension      erenumab-aooe (AIMOVIG AUTOINJECTOR, 2 PACK,) 70 mg/mL atIn by SubCUTAneous route. alum-mag hydroxide-simeth (MYLANTA) 200-200-20 mg/5 mL susp Take 30 mL by mouth three (3) times daily (with meals). Qty: 354 mL, Refills: 0         STOP taking these medications       aspirin delayed-release 81 mg tablet Comments:   Reason for Stopping:         atorvastatin (LIPITOR) 20 mg tablet Comments:   Reason for Stopping:         ranolazine ER (RANEXA) 500 mg SR tablet Comments:   Reason for Stopping:         metoprolol tartrate (LOPRESSOR) 25 mg tablet Comments:   Reason for Stopping:               Discharge Condition: Stable     Procedures : LHC and coronary angiogram     Consults: None       PHYSICAL EXAM   Visit Vitals  BP (!) 133/91 (BP 1 Location: Right arm, BP Patient Position: At rest;Supine)   Pulse 70   Temp 98.2 °F (36.8 °C)   Resp 20   Ht 5' 6\" (1.676 m)   Wt 73.5 kg (162 lb)   SpO2 100%   Breastfeeding? No   BMI 26.15 kg/m²     General: Awake, cooperative, no acute distress    HEENT: NC, Atraumatic. PERRLA, EOMI. Anicteric sclerae. Lungs:  CTA Bilaterally. No Wheezing/Rhonchi/Rales. Heart:  Regular  rhythm,  No murmur, No Rubs, No Gallops  Abdomen: Soft, Non distended, Non tender. +Bowel sounds,   Extremities: No c/c/e  Psych:   Not anxious or agitated.   Neurologic:  No acute neurological deficits. Admission HPI : See H/p     Hospital Course :Patient came for out patient LHC and coronary angiogram.  LHC and Coronary angiogram done via left radial approach. Coronary anatomy described below. LV gram was not done. LVEDP 4-6 mm hg. No significant gradient on pull back. Left main is normal and trifurcates in LAD, Ramus and LCx. LAD is normal. No intraluminal narrowing seen. LAD has corkscrew appearance. Ramus is medium caliber vessel. No intraluminal narrowing seen. LCx is non dominant vessel. No intraluminal narrowing seen. LCx has corkscrew appearance. RCA is dominant vessel. RPDA is small caliber vessel. RPLA is medium caliber vessel. No intraluminal narrowing seen. Patient tolerated procedure well. Scant blood loss. No complications. No specimen removed. Recommendation:    CAD risk factor education  Diet education  Control cholesterol  Blood pressure control  Exercise education: Age and functional status appropriate. Consider evaluation for other sources of reported symptoms. Restart Xarelto from this evening. Activity: No driving for 24 hours, no weight lifting not more than 10 lbs from left hand for 1 week    Diet: Cardiac     Follow-up: In cardiology clinic after 2 weeks    Disposition: Home    Minutes spent on discharge: 35 minutes       Labs: Results:       Chemistry No results for input(s): GLU, NA, K, CL, CO2, BUN, CREA, CA, AGAP, BUCR, TBIL, GPT, AP, TP, ALB, GLOB, AGRAT in the last 72 hours. CBC w/Diff No results for input(s): WBC, RBC, HGB, HCT, PLT, GRANS, LYMPH, EOS, HGBEXT, HCTEXT, PLTEXT in the last 72 hours. Cardiac Enzymes No results for input(s): CPK, CKND1, JANNETTE in the last 72 hours. No lab exists for component: CKRMB, TROIP   Coagulation No results for input(s): PTP, INR, APTT in the last 72 hours.     No lab exists for component: INREXT    Lipid Panel No results found for: CHOL, CHOLPOCT, CHOLX, CHLST, CHOLV, [id number], HDL, LDL, LDLC, DLDLP, [id number], VLDLC, VLDL, TGLX, TRIGL, TRIGP, TGLPOCT, CHHD, CHHDX   BNP No results for input(s): BNPP in the last 72 hours. Liver Enzymes No results for input(s): TP, ALB, TBIL, AP, SGOT, GPT in the last 72 hours. No lab exists for component: DBIL   Thyroid Studies No results found for: T4, T3U, TSH, TSHEXT         Significant Diagnostic Studies: No results found. No results found for this or any previous visit.         CC: Eloina Eaton PA-C

## 2019-01-15 NOTE — ROUTINE PROCESS
TR band released, 2x2 & tegaderm applied no bleeding or swelling. 1215 Assisted up to bathroom, voided. 1235 Discharge instructions given & reviewed. Verbalized understanding. pt discharged in care of son in stable condition. C/o of mild generalized pain which is chronic. 3/10. No bleeding or swelling noted on left arm. Pt left via w/c home.

## 2019-01-15 NOTE — H&P
Date of Surgery Update: 
Cici Villalta was seen and examined. History and physical has been reviewed. The patient has been examined. There have been no significant clinical changes since the completion of the originally dated History and Physical. 
 
 
Patient assessed and is candidate for moderate sedation.    
 
Signed By: Praful Hutson MD   
 January 15, 2019 9:17 AM

## 2019-01-15 NOTE — DISCHARGE INSTRUCTIONS
Cardiac Catheterization/Angiography Discharge Instructions    *Check the puncture site frequently for swelling or bleeding. If you see any bleeding, lie down and apply pressure over the area with a clean town or washcloth. Notify your doctor for any redness, swelling, drainage or oozing from the puncture site. Notify your doctor for any fever or chills. *If the leg or arm with the puncture becomes cold, numb or painful, call Dr Paula Carbone      *Activity should be limited for the next 48 hours. Climb stairs as little as possible and avoid any stooping, bending or strenuous activity for 48 hours. No heavy lifting (anything over 10 pounds) for three days. *Do not drive for 48 hours. *You may resume your usual diet. Drink more fluids than usual.    *Have a responsible person drive you home and stay with you for at least 24 hours after your heart catheterization/angiography. *You may remove the bandage from your Left and Arm in 24 hours. You may shower in 24 hours. No tub baths, hot tubs or swimming for one week. Do not place any lotions, creams, powders, ointments over the puncture site for one week. You may place a clean band-aid over the puncture site each day for 5 days. Change this daily.     Patient {ARMBANDS:[id number]}

## 2019-01-15 NOTE — Clinical Note
TRANSFER - OUT REPORT:  
 
Verbal report given to: PRESTON CHEN. Report consisted of patient's Situation, Background, Assessment and  
Recommendations(SBAR). Opportunity for questions and clarification was provided. Patient transported with a Cardiac Cath Tech / Patient Care Tech. Patient transported to: C[facility].

## 2019-01-15 NOTE — Clinical Note
Patient transported with a Registered Nurse and [address] [place] [address] / [facility].

## 2019-01-15 NOTE — PROCEDURES
LHC and Coronary angiogram done via left radial approach. Coronary anatomy described below. LV gram was not done. LVEDP 4-6 mm hg. No significant gradient on pull back. Left main is normal and trifurcates in LAD, Ramus and LCx. LAD is normal. No intraluminal narrowing seen. LAD has corkscrew appearance. Ramus is medium caliber vessel. No intraluminal narrowing seen. LCx is non dominant vessel. No intraluminal narrowing seen. LCx has corkscrew appearance. RCA is dominant vessel. RPDA is small caliber vessel. RPLA is medium caliber vessel. No intraluminal narrowing seen. Patient tolerated procedure well. Scant blood loss. No complications. No specimen removed. Recommendation:    CAD risk factor education  Diet education  Control cholesterol  Blood pressure control  Exercise education: Age and functional status appropriate. Consider evaluation for other sources of reported symptoms. Restart Xarelto from this evening.

## 2019-04-28 ENCOUNTER — APPOINTMENT (OUTPATIENT)
Dept: GENERAL RADIOLOGY | Age: 39
End: 2019-04-28
Attending: PHYSICIAN ASSISTANT
Payer: OTHER GOVERNMENT

## 2019-04-28 ENCOUNTER — HOSPITAL ENCOUNTER (EMERGENCY)
Age: 39
Discharge: HOME | End: 2019-04-28
Attending: EMERGENCY MEDICINE
Payer: OTHER GOVERNMENT

## 2019-04-28 ENCOUNTER — APPOINTMENT (OUTPATIENT)
Dept: CT IMAGING | Age: 39
End: 2019-04-28
Attending: PHYSICIAN ASSISTANT
Payer: OTHER GOVERNMENT

## 2019-04-28 VITALS
SYSTOLIC BLOOD PRESSURE: 117 MMHG | OXYGEN SATURATION: 100 % | WEIGHT: 150 LBS | HEIGHT: 66 IN | DIASTOLIC BLOOD PRESSURE: 78 MMHG | HEART RATE: 78 BPM | RESPIRATION RATE: 24 BRPM | TEMPERATURE: 97.7 F | BODY MASS INDEX: 24.11 KG/M2

## 2019-04-28 DIAGNOSIS — R10.9 RIGHT SIDED ABDOMINAL PAIN: Primary | ICD-10-CM

## 2019-04-28 DIAGNOSIS — K59.00 CONSTIPATION, UNSPECIFIED CONSTIPATION TYPE: ICD-10-CM

## 2019-04-28 DIAGNOSIS — Z87.898 HISTORY OF CHRONIC PAIN: ICD-10-CM

## 2019-04-28 LAB
ALBUMIN SERPL-MCNC: 4.2 G/DL (ref 3.4–5)
ALBUMIN/GLOB SERPL: 1.2 {RATIO} (ref 0.8–1.7)
ALP SERPL-CCNC: 68 U/L (ref 45–117)
ALT SERPL-CCNC: 26 U/L (ref 13–56)
AMORPH CRY URNS QL MICRO: ABNORMAL
ANION GAP SERPL CALC-SCNC: 10 MMOL/L (ref 3–18)
APPEARANCE UR: ABNORMAL
AST SERPL-CCNC: 26 U/L (ref 15–37)
BACTERIA URNS QL MICRO: ABNORMAL /HPF
BASOPHILS # BLD: 0.1 K/UL (ref 0–0.1)
BASOPHILS NFR BLD: 1 % (ref 0–2)
BILIRUB SERPL-MCNC: 0.3 MG/DL (ref 0.2–1)
BILIRUB UR QL: NEGATIVE
BUN SERPL-MCNC: 9 MG/DL (ref 7–18)
BUN/CREAT SERPL: 10 (ref 12–20)
CALCIUM SERPL-MCNC: 9.2 MG/DL (ref 8.5–10.1)
CHLORIDE SERPL-SCNC: 110 MMOL/L (ref 100–108)
CO2 SERPL-SCNC: 22 MMOL/L (ref 21–32)
COLOR UR: YELLOW
CREAT SERPL-MCNC: 0.94 MG/DL (ref 0.6–1.3)
DIFFERENTIAL METHOD BLD: ABNORMAL
EOSINOPHIL # BLD: 0.1 K/UL (ref 0–0.4)
EOSINOPHIL NFR BLD: 2 % (ref 0–5)
EPITH CASTS URNS QL MICRO: ABNORMAL /LPF (ref 0–5)
ERYTHROCYTE [DISTWIDTH] IN BLOOD BY AUTOMATED COUNT: 12.8 % (ref 11.6–14.5)
GLOBULIN SER CALC-MCNC: 3.4 G/DL (ref 2–4)
GLUCOSE SERPL-MCNC: 85 MG/DL (ref 74–99)
GLUCOSE UR STRIP.AUTO-MCNC: NEGATIVE MG/DL
HCG UR QL: NEGATIVE
HCT VFR BLD AUTO: 40.8 % (ref 35–45)
HGB BLD-MCNC: 13.7 G/DL (ref 12–16)
HGB UR QL STRIP: NEGATIVE
KETONES UR QL STRIP.AUTO: NEGATIVE MG/DL
LEUKOCYTE ESTERASE UR QL STRIP.AUTO: ABNORMAL
LIPASE SERPL-CCNC: 160 U/L (ref 73–393)
LYMPHOCYTES # BLD: 2.3 K/UL (ref 0.9–3.6)
LYMPHOCYTES NFR BLD: 28 % (ref 21–52)
MCH RBC QN AUTO: 31.6 PG (ref 24–34)
MCHC RBC AUTO-ENTMCNC: 33.6 G/DL (ref 31–37)
MCV RBC AUTO: 94.2 FL (ref 74–97)
MONOCYTES # BLD: 0.8 K/UL (ref 0.05–1.2)
MONOCYTES NFR BLD: 9 % (ref 3–10)
NEUTS SEG # BLD: 5 K/UL (ref 1.8–8)
NEUTS SEG NFR BLD: 60 % (ref 40–73)
NITRITE UR QL STRIP.AUTO: NEGATIVE
PH UR STRIP: 8 [PH] (ref 5–8)
PLATELET # BLD AUTO: 356 K/UL (ref 135–420)
PMV BLD AUTO: 9 FL (ref 9.2–11.8)
POTASSIUM SERPL-SCNC: 3.6 MMOL/L (ref 3.5–5.5)
PROT SERPL-MCNC: 7.6 G/DL (ref 6.4–8.2)
PROT UR STRIP-MCNC: NEGATIVE MG/DL
RBC # BLD AUTO: 4.33 M/UL (ref 4.2–5.3)
RBC #/AREA URNS HPF: NEGATIVE /HPF (ref 0–5)
SODIUM SERPL-SCNC: 142 MMOL/L (ref 136–145)
SP GR UR REFRACTOMETRY: 1.02 (ref 1–1.03)
UROBILINOGEN UR QL STRIP.AUTO: 1 EU/DL (ref 0.2–1)
WBC # BLD AUTO: 8.3 K/UL (ref 4.6–13.2)
WBC URNS QL MICRO: ABNORMAL /HPF (ref 0–5)

## 2019-04-28 PROCEDURE — 96361 HYDRATE IV INFUSION ADD-ON: CPT

## 2019-04-28 PROCEDURE — 96375 TX/PRO/DX INJ NEW DRUG ADDON: CPT

## 2019-04-28 PROCEDURE — 85025 COMPLETE CBC W/AUTO DIFF WBC: CPT

## 2019-04-28 PROCEDURE — 99284 EMERGENCY DEPT VISIT MOD MDM: CPT

## 2019-04-28 PROCEDURE — 80053 COMPREHEN METABOLIC PANEL: CPT

## 2019-04-28 PROCEDURE — 74022 RADEX COMPL AQT ABD SERIES: CPT

## 2019-04-28 PROCEDURE — 74011250636 HC RX REV CODE- 250/636: Performed by: PHYSICIAN ASSISTANT

## 2019-04-28 PROCEDURE — 74176 CT ABD & PELVIS W/O CONTRAST: CPT

## 2019-04-28 PROCEDURE — 83690 ASSAY OF LIPASE: CPT

## 2019-04-28 PROCEDURE — 96374 THER/PROPH/DIAG INJ IV PUSH: CPT

## 2019-04-28 PROCEDURE — 81001 URINALYSIS AUTO W/SCOPE: CPT

## 2019-04-28 PROCEDURE — 81025 URINE PREGNANCY TEST: CPT

## 2019-04-28 RX ORDER — DICYCLOMINE HYDROCHLORIDE 10 MG/1
20 CAPSULE ORAL 3 TIMES DAILY
Qty: 20 CAP | Refills: 0 | Status: SHIPPED | OUTPATIENT
Start: 2019-04-28

## 2019-04-28 RX ORDER — DICYCLOMINE HYDROCHLORIDE 20 MG/1
20 TABLET ORAL EVERY 6 HOURS
Qty: 20 TAB | Refills: 0 | Status: SHIPPED | OUTPATIENT
Start: 2019-04-28 | End: 2019-05-03

## 2019-04-28 RX ORDER — ONDANSETRON 2 MG/ML
4 INJECTION INTRAMUSCULAR; INTRAVENOUS
Status: COMPLETED | OUTPATIENT
Start: 2019-04-28 | End: 2019-04-28

## 2019-04-28 RX ORDER — POLYETHYLENE GLYCOL 3350 17 G/17G
17 POWDER, FOR SOLUTION ORAL DAILY
Qty: 289 G | Refills: 0 | Status: SHIPPED | OUTPATIENT
Start: 2019-04-28

## 2019-04-28 RX ORDER — DIPHENHYDRAMINE HYDROCHLORIDE 50 MG/ML
25 INJECTION, SOLUTION INTRAMUSCULAR; INTRAVENOUS
Status: COMPLETED | OUTPATIENT
Start: 2019-04-28 | End: 2019-04-28

## 2019-04-28 RX ORDER — MORPHINE SULFATE 4 MG/ML
4 INJECTION INTRAVENOUS
Status: COMPLETED | OUTPATIENT
Start: 2019-04-28 | End: 2019-04-28

## 2019-04-28 RX ADMIN — MORPHINE SULFATE 4 MG: 4 INJECTION INTRAVENOUS at 20:58

## 2019-04-28 RX ADMIN — ONDANSETRON 4 MG: 2 INJECTION INTRAMUSCULAR; INTRAVENOUS at 20:51

## 2019-04-28 RX ADMIN — DIPHENHYDRAMINE HYDROCHLORIDE 25 MG: 50 INJECTION, SOLUTION INTRAMUSCULAR; INTRAVENOUS at 20:58

## 2019-04-28 RX ADMIN — SODIUM CHLORIDE 1000 ML: 900 INJECTION, SOLUTION INTRAVENOUS at 20:51

## 2019-04-29 NOTE — ED TRIAGE NOTES
Pt arrived ambu to ED with  w/ c/o diffuse abdominal pain starting at 1600 today.  Pt denies N/V/D

## 2019-04-29 NOTE — ED PROVIDER NOTES
EMERGENCY DEPARTMENT HISTORY AND PHYSICAL EXAM 
 
Date: 4/28/2019 Patient Name: Stephany Saunders History of Presenting Illness Chief Complaint Patient presents with  Abdominal Pain History Provided By: Patient Stephany Saunders is a 41 y.o. female with PMHX of gastric bypass multiple abdominal surgeries to include hernia repairs cholecystectomy hysterectomy as well as fibromyalgia and chronic pain who presents to the emergency department C/O abdominal pain. Associated sxs include nausea. Patient reports diffuse abdominal pain onset this morning associated with some nausea. Patient states she is try to rest at home and sleep it off however the pain had intensified and decreased on the right side. Right-sided pt denies fever vomiting diarrhea constipation chest pain shortness of breath, and any other sxs or complaints. PCP: Jax Darnell PA-C Current Outpatient Medications Medication Sig Dispense Refill  dicyclomine (BENTYL) 20 mg tablet Take 1 Tab by mouth every six (6) hours for 20 doses. 20 Tab 0  
 polyethylene glycol (MIRALAX) 17 gram/dose powder Take 17 g by mouth daily. 1 tablespoon with 8 oz of water daily 289 g 0  
 dicyclomine (BENTYL) 10 mg capsule Take 2 Caps by mouth three (3) times daily. 20 Cap 0  
 rivaroxaban (XARELTO) 20 mg tab tablet Take 1 Tab by mouth daily (with dinner). Start from 9/30/2017 1 Tab 0  
 erenumab-aooe (AIMOVIG AUTOINJECTOR, 2 PACK,) 70 mg/mL atIn by SubCUTAneous route.  butalbital-acetaminophen-caffeine (FIORICET, ESGIC) -40 mg per tablet Take 1 Tab by mouth every six (6) hours as needed.  DULoxetine (CYMBALTA) 60 mg capsule Take 60 mg by mouth daily.  oxyCODONE-acetaminophen (PERCOCET)  mg per tablet Take  by mouth every four (4) hours as needed for Pain.  acetaminophen (TYLENOL EXTRA STRENGTH) 500 mg tablet Take 1,000 mg by mouth every six (6) hours as needed for Pain.  pregabalin (LYRICA) 150 mg capsule Take 150 mg by mouth two (2) times a day.  FLUoxetine (PROZAC) 20 mg capsule Take 20 mg by mouth daily.  ondansetron hcl (ZOFRAN) 4 mg tablet Take 4 mg by mouth every eight (8) hours as needed for Nausea.  alum-mag hydroxide-simeth (MYLANTA) 200-200-20 mg/5 mL susp Take 30 mL by mouth three (3) times daily (with meals). 354 mL 0  
 multivitamin, tx-iron-ca-min (THERA-M W/ IRON) 9 mg iron-400 mcg tab tablet Take 1 Tab by mouth daily.  topiramate (TOPAMAX) 100 mg tablet Take 100 mg by mouth two (2) times a day.  midodrine (PROAMITINE) 5 mg tablet Take 10 mg by mouth three (3) times daily. Indications: Symptomatic Orthostatic Hypotension Past History Past Medical History: 
Past Medical History:  
Diagnosis Date  Abdominal pain  Anemia   
 iron infusions-every 6 months  Arthritis   
 osteo-knee  Autoimmune disease ([facility] [address]   
 fibromylgia  Chronic pain   
 abdominal pain  Coagulation disorder (HCC)   
 anemia  H/O blood clots  Hypotension  Ill-defined condition \"low bp\"  Ill-defined condition \"abd blood clot\"  Ill-defined condition   
 neuro cardiogenic syncope  Psychiatric disorder  PUD (peptic ulcer disease) 2007 H/O  Thromboembolus ([facility] [address] DVT x 2 possibly due to numerous surgeries mesenteric vein Past Surgical History: 
Past Surgical History:  
Procedure Laterality Date  BREAST SURGERY PROCEDURE UNLISTED    
 breast reduction  HX BREAST BIOPSY Left  HX CHOLECYSTECTOMY  HX CYSTECTOMY  HX GASTRIC BYPASS  2008  HX GI    
 choley [facility]  2011,  2015,   2016  HX KNEE ARTHROSCOPY Left 2013  HX OOPHORECTOMY Right   
 as well as tube with D&C  
 HX OTHER SURGICAL  2001  
 pilonidal cyst  
 VASCULAR SURGERY PROCEDURE UNLIST    
 intervential radiologist fixed blood cot in the abdomen Family History: 
Family History Problem Relation Age of Onset  Heart Disease Mother  Diabetes Father  Cancer Father Social History: 
Social History Tobacco Use  Smoking status: Never Smoker  Smokeless tobacco: Never Used Substance Use Topics  Alcohol use: Yes Alcohol/week: 0.6 oz Types: 1 Glasses of wine per week Comment: 4-6 times a year  Drug use: No  
 
 
Allergies: 
No Known Allergies Review of Systems Review of Systems Constitutional: Negative for fever. Gastrointestinal: Positive for abdominal pain and nausea. Negative for blood in stool, constipation, diarrhea and vomiting. Genitourinary: Negative for flank pain. All other systems reviewed and are negative. Physical Exam  
 
Vitals:  
 04/28/19 2002 BP: 119/86 Pulse: 83 Resp: 24 Temp: 97.7 °F (36.5 °C) SpO2: 100% Weight: 68 kg (150 lb) Height: 5' 6\" (1.676 m) Physical Exam  
Constitutional: She is oriented to person, place, and time. She appears well-developed and well-nourished. No distress. HENT:  
Head: Normocephalic and atraumatic. Eyes: Pupils are equal, round, and reactive to light. Conjunctivae and EOM are normal.  
Neck: Normal range of motion. Neck supple. Cardiovascular: Normal rate and regular rhythm. Pulmonary/Chest: Effort normal and breath sounds normal.  
Abdominal: Soft. Bowel sounds are normal. There is tenderness. Musculoskeletal: Normal range of motion. Neurological: She is alert and oriented to person, place, and time. Skin: Skin is warm and dry. Psychiatric: She has a normal mood and affect. Her behavior is normal.  
Nursing note and vitals reviewed. Diagnostic Study Results Labs - Recent Results (from the past 12 hour(s)) URINALYSIS W/ RFLX MICROSCOPIC Collection Time: 04/28/19  7:45 PM  
Result Value Ref Range Color YELLOW Appearance TURBID Specific gravity 1.018 1.005 - 1.030    
 pH (UA) 8.0 5.0 - 8.0 Protein NEGATIVE  NEG mg/dL Glucose NEGATIVE  NEG mg/dL Ketone NEGATIVE  NEG mg/dL Bilirubin NEGATIVE  NEG Blood NEGATIVE  NEG Urobilinogen 1.0 0.2 - 1.0 EU/dL Nitrites NEGATIVE  NEG Leukocyte Esterase TRACE (A) NEG URINE MICROSCOPIC ONLY Collection Time: 04/28/19  7:45 PM  
Result Value Ref Range WBC 0 to 3 0 - 5 /hpf  
 RBC NEGATIVE  0 - 5 /hpf Epithelial cells FEW 0 - 5 /lpf Bacteria 2+ (A) NEG /hpf Amorphous Crystals 2+ (A) NEG  
HCG URINE, QL. - POC Collection Time: 04/28/19  8:11 PM  
Result Value Ref Range Pregnancy test,urine (POC) NEGATIVE  NEG    
CBC WITH AUTOMATED DIFF Collection Time: 04/28/19  8:20 PM  
Result Value Ref Range WBC 8.3 4.6 - 13.2 K/uL  
 RBC 4.33 4.20 - 5.30 M/uL  
 HGB 13.7 12.0 - 16.0 g/dL HCT 40.8 35.0 - 45.0 % MCV 94.2 74.0 - 97.0 FL  
 MCH 31.6 24.0 - 34.0 PG  
 MCHC 33.6 31.0 - 37.0 g/dL  
 RDW 12.8 11.6 - 14.5 % PLATELET 968 247 - 956 K/uL MPV 9.0 (L) 9.2 - 11.8 FL  
 NEUTROPHILS 60 40 - 73 % LYMPHOCYTES 28 21 - 52 % MONOCYTES 9 3 - 10 % EOSINOPHILS 2 0 - 5 % BASOPHILS 1 0 - 2 %  
 ABS. NEUTROPHILS 5.0 1.8 - 8.0 K/UL  
 ABS. LYMPHOCYTES 2.3 0.9 - 3.6 K/UL  
 ABS. MONOCYTES 0.8 0.05 - 1.2 K/UL  
 ABS. EOSINOPHILS 0.1 0.0 - 0.4 K/UL  
 ABS. BASOPHILS 0.1 0.0 - 0.1 K/UL  
 DF AUTOMATED METABOLIC PANEL, COMPREHENSIVE Collection Time: 04/28/19  8:20 PM  
Result Value Ref Range Sodium 142 136 - 145 mmol/L Potassium 3.6 3.5 - 5.5 mmol/L Chloride 110 (H) 100 - 108 mmol/L  
 CO2 22 21 - 32 mmol/L Anion gap 10 3.0 - 18 mmol/L Glucose 85 74 - 99 mg/dL BUN 9 7.0 - 18 MG/DL Creatinine 0.94 0.6 - 1.3 MG/DL  
 BUN/Creatinine ratio 10 (L) 12 - 20 GFR est AA >60 >60 ml/min/1.73m2 GFR est non-AA >60 >60 ml/min/1.73m2 Calcium 9.2 8.5 - 10.1 MG/DL Bilirubin, total 0.3 0.2 - 1.0 MG/DL  
 ALT (SGPT) 26 13 - 56 U/L  
 AST (SGOT) 26 15 - 37 U/L Alk.  phosphatase 68 45 - 117 U/L  
 Protein, total 7.6 6.4 - 8.2 g/dL Albumin 4.2 3.4 - 5.0 g/dL Globulin 3.4 2.0 - 4.0 g/dL A-G Ratio 1.2 0.8 - 1.7 LIPASE Collection Time: 04/28/19  8:20 PM  
Result Value Ref Range Lipase 160 73 - 393 U/L Radiologic Studies -  
CT ABD PELV WO CONT Final Result IMPRESSION:   
  
No evidence of urinary tract stones. No acute intra-abdominal findings in this CT noncontrast exam.  
  
Large colonic stool burden. XR ABD ACUTE W 1 V CHEST Final Result IMPRESSION:  
  
No suspicious radiographic findings. CT Results  (Last 48 hours) 04/28/19 2206  CT ABD PELV WO CONT Final result Impression:  IMPRESSION:   
   
No evidence of urinary tract stones. No acute intra-abdominal findings in this CT noncontrast exam.  
   
Large colonic stool burden. Narrative:  EXAM: CT ABD PELV WO CONT INDICATION: right sided abd pain COMPARISON: None. CONTRAST: None. TECHNIQUE:   
Unenhanced multislice helical CT was performed from the diaphragm to the  
symphysis pubis without oral or intravenous contrast administration. Contiguous 5 mm axial images were reconstructed and lung and soft tissue windows were  
generated. Coronal and sagittal reformations were generated. CT dose reduction  
was achieved through use of a standardized protocol tailored for this  
examination and automatic exposure control for dose modulation. FINDINGS:  
The visualized portions of the lung bases are clear. The absence of intravenous contrast material reduces the sensitivity for  
evaluation of the solid parenchymal organs of the abdomen. Coil material is noted in the right lobe of the liver. Cholecystectomy clips. There are postsurgical changes of a Damon-en-Y gastric bypass. There is large colonic stool burden. The appendix is air-filled and within  
normal limits. There is coiling material within the right lower quadrant of the abdomen with  
secondary streak and beam hardening artifact. There is no hydroureter or hydronephrosis. There are multiple pelvic  
phleboliths. No definite urinary tract stones are identified. The aorta tapers without aneurysm. There is no retroperitoneal adenopathy or  
mass. Uterus is surgically absent. No free fluid. CXR Results  (Last 48 hours) 04/28/19 2127  XR ABD ACUTE W 1 V CHEST Final result Impression:  IMPRESSION:  
   
No suspicious radiographic findings. Narrative:  EXAMINATION RADIOGRAPHIC EXAM OF THE ABDOMEN. ACUTE ABDOMINAL SERIES WITH ONE  
VIEW OF THE CHEST. CLINICAL HISTORY: Abdominal pain. COMPARISON: Chest radiographs 10/22/2018. FINDINGS:  
   
Thoracic portions are normal.  
   
Coil material is noted in the right upper and right lower portions of the  
abdomen. Suture material is noted in the right lower abdomen. There is colonic  
stool burden. There is no extraluminal air. No suspicious radiographic shadows. Medications given in the ED- Medications  
sodium chloride 0.9 % bolus infusion 1,000 mL (0 mL IntraVENous IV Completed 4/28/19 2216) morphine injection 4 mg (4 mg IntraVENous Given 4/28/19 2058)  
ondansetron (ZOFRAN) injection 4 mg (4 mg IntraVENous Given 4/28/19 2051) diphenhydrAMINE (BENADRYL) injection 25 mg (25 mg IntraVENous Given 4/28/19 2058) Medical Decision Making I am the first provider for this patient. I reviewed the vital signs, available nursing notes, past medical history, past surgical history, family history and social history. Vital Signs-Reviewed the patient's vital signs. Records Reviewed: Nursing Notes Procedures: 
Procedures ED Course:  
Initial assessment performed.  The patients presenting problems have been discussed, and they are in agreement with the care plan formulated and outlined with them. I have encouraged them to ask questions as they arise throughout their visit. Discussion: 41 y.o. female 12 hours of right-sided abdominal pain associated with some nausea. She with multiple abdominal surgeries fibromyalgia and chronic pain. Patient afebrile tolerating p.o. normal vital signs diagnostics all within normal limits with exception of x-ray that shows increased stool. CT abdomen pelvis read reveals no acute findings other than significant stool burden. Patient likely constipated will plan for Bentyl MiraLAX and close PCP follow-up. Strict return precautions discussed. Diagnosis and Disposition DISCHARGE NOTE: 
David Coppola's  results have been reviewed with her. She has been counseled regarding her diagnosis, treatment, and plan. She verbally conveys understanding and agreement of the signs, symptoms, diagnosis, treatment and prognosis and additionally agrees to follow up as discussed. She also agrees with the care-plan and conveys that all of her questions have been answered. I have also provided discharge instructions for her that include: educational information regarding their diagnosis and treatment, and list of reasons why they would want to return to the ED prior to their follow-up appointment, should her condition change. She has been provided with education for proper emergency department utilization. CLINICAL IMPRESSION: 
 
1. Right sided abdominal pain 2. Constipation, unspecified constipation type 3. History of chronic pain PLAN: 
1. D/C Home 2. Current Discharge Medication List  
  
START taking these medications Details  
dicyclomine (BENTYL) 20 mg tablet Take 1 Tab by mouth every six (6) hours for 20 doses. Qty: 20 Tab, Refills: 0  
  
polyethylene glycol (MIRALAX) 17 gram/dose powder Take 17 g by mouth daily. 1 tablespoon with 8 oz of water daily 
Qty: 289 g, Refills: 0 CONTINUE these medications which have CHANGED Details  
dicyclomine (BENTYL) 10 mg capsule Take 2 Caps by mouth three (3) times daily. Qty: 20 Cap, Refills: 0  
  
  
 
3. Follow-up Information Follow up With Specialties Details Why Contact Info Opal Kaye PA-C Physician Assistant Schedule an appointment as soon as possible for a visit  3 Mary Lopez [facility] 
[phone] THE [facility] EMERGENCY DEPT Emergency Medicine  As needed 2 Helena Correa [facility] [address] [phone] Please note that this dictation was completed with [employer], the [employer] voice recognition software. Quite often unanticipated grammatical, syntax, homophones, and other interpretive errors are inadvertently transcribed by the computer software. Please disregard these errors. Please excuse any errors that have escaped final proofreading.

## 2019-04-29 NOTE — DISCHARGE INSTRUCTIONS
Patient Education        Abdominal Pain: Care Instructions  Your Care Instructions    Abdominal pain has many possible causes. Some aren't serious and get better on their own in a few days. Others need more testing and treatment. If your pain continues or gets worse, you need to be rechecked and may need more tests to find out what is wrong. You may need surgery to correct the problem. Don't ignore new symptoms, such as fever, nausea and vomiting, urination problems, pain that gets worse, and dizziness. These may be signs of a more serious problem. Your doctor may have recommended a follow-up visit in the next 8 to 12 hours. If you are not getting better, you may need more tests or treatment. The doctor has checked you carefully, but problems can develop later. If you notice any problems or new symptoms, get medical treatment right away. Follow-up care is a key part of your treatment and safety. Be sure to make and go to all appointments, and call your doctor if you are having problems. It's also a good idea to know your test results and keep a list of the medicines you take. How can you care for yourself at home? · Rest until you feel better. · To prevent dehydration, drink plenty of fluids, enough so that your urine is light yellow or clear like water. Choose water and other caffeine-free clear liquids until you feel better. If you have kidney, heart, or liver disease and have to limit fluids, talk with your doctor before you increase the amount of fluids you drink. · If your stomach is upset, eat mild foods, such as rice, dry toast or crackers, bananas, and applesauce. Try eating several small meals instead of two or three large ones. · Wait until 48 hours after all symptoms have gone away before you have spicy foods, alcohol, and drinks that contain caffeine. · Do not eat foods that are high in fat. · Avoid anti-inflammatory medicines such as aspirin, ibuprofen (Advil, Motrin), and naproxen (Aleve). These can cause stomach upset. Talk to your doctor if you take daily aspirin for another health problem. When should you call for help? Call 911 anytime you think you may need emergency care. For example, call if:    · You passed out (lost consciousness).     · You pass maroon or very bloody stools.     · You vomit blood or what looks like coffee grounds.     · You have new, severe belly pain.    Call your doctor now or seek immediate medical care if:    · Your pain gets worse, especially if it becomes focused in one area of your belly.     · You have a new or higher fever.     · Your stools are black and look like tar, or they have streaks of blood.     · You have unexpected vaginal bleeding.     · You have symptoms of a urinary tract infection. These may include:  ? Pain when you urinate. ? Urinating more often than usual.  ? Blood in your urine.     · You are dizzy or lightheaded, or you feel like you may faint.    Watch closely for changes in your health, and be sure to contact your doctor if:    · You are not getting better after 1 day (24 hours). Where can you learn more? Go to http://radha-hailey.info/. Enter O033 in the search box to learn more about \"Abdominal Pain: Care Instructions. \"  Current as of: September 23, 2018  Content Version: 11.9  © 5446-7054 [employer]. Care instructions adapted under license by [employer] (which disclaims liability or warranty for this information). If you have questions about a medical condition or this instruction, always ask your healthcare professional. [place] [address] any warranty or liability for your use of this information. Patient Education        Constipation: Care Instructions  Your Care Instructions    Constipation means that you have a hard time passing stools (bowel movements). People pass stools from 3 times a day to once every 3 days. What is normal for you may be different. Constipation may occur with pain in the rectum and cramping. The pain may get worse when you try to pass stools. Sometimes there are small amounts of bright red blood on toilet paper or the surface of stools. This is because of enlarged veins near the rectum (hemorrhoids). A few changes in your diet and lifestyle may help you avoid ongoing constipation. Your doctor may also prescribe medicine to help loosen your stool. Some medicines can cause constipation. These include pain medicines and antidepressants. Tell your doctor about all the medicines you take. Your doctor may want to make a medicine change to ease your symptoms. Follow-up care is a key part of your treatment and safety. Be sure to make and go to all appointments, and call your doctor if you are having problems. It's also a good idea to know your test results and keep a list of the medicines you take. How can you care for yourself at home? · Drink plenty of fluids, enough so that your urine is light yellow or clear like water. If you have kidney, heart, or liver disease and have to limit fluids, talk with your doctor before you increase the amount of fluids you drink. · Include high-fiber foods in your diet each day. These include fruits, vegetables, beans, and whole grains. · Get at least 30 minutes of exercise on most days of the week. Walking is a good choice. You also may want to do other activities, such as running, swimming, cycling, or playing tennis or team sports. · Take a fiber supplement, such as Citrucel or Metamucil, every day. Read and follow all instructions on the label. · Schedule time each day for a bowel movement. A daily routine may help. Take your time having your bowel movement. · Support your feet with a small step stool when you sit on the toilet. This helps flex your hips and places your pelvis in a squatting position. · Your doctor may recommend an over-the-counter laxative to relieve your constipation.  Examples are Milk of Magnesia and MiraLax. Read and follow all instructions on the label. Do not use laxatives on a long-term basis. When should you call for help? Call your doctor now or seek immediate medical care if:    · You have new or worse belly pain.     · You have new or worse nausea or vomiting.     · You have blood in your stools.    Watch closely for changes in your health, and be sure to contact your doctor if:    · Your constipation is getting worse.     · You do not get better as expected. Where can you learn more? Go to http://radha-hailey.info/. Enter 21  in the search box to learn more about \"Constipation: Care Instructions. \"  Current as of: September 23, 2018  Content Version: 11.9  © 2319-5786 [employer]. Care instructions adapted under license by [employer] (which disclaims liability or warranty for this information). If you have questions about a medical condition or this instruction, always ask your healthcare professional. [address] any warranty or liability for your use of this information.

## 2019-06-17 ENCOUNTER — OFFICE VISIT (OUTPATIENT)
Dept: SURGERY | Age: 39
End: 2019-06-17

## 2019-06-17 ENCOUNTER — HOSPITAL ENCOUNTER (OUTPATIENT)
Dept: LAB | Age: 39
Discharge: HOME | End: 2019-06-17
Payer: OTHER GOVERNMENT

## 2019-06-17 VITALS
WEIGHT: 143 LBS | HEART RATE: 77 BPM | OXYGEN SATURATION: 100 % | RESPIRATION RATE: 16 BRPM | HEIGHT: 66 IN | SYSTOLIC BLOOD PRESSURE: 111 MMHG | DIASTOLIC BLOOD PRESSURE: 71 MMHG | TEMPERATURE: 98.8 F | BODY MASS INDEX: 22.98 KG/M2

## 2019-06-17 DIAGNOSIS — K90.9 INTESTINAL MALABSORPTION, UNSPECIFIED TYPE: Primary | ICD-10-CM

## 2019-06-17 DIAGNOSIS — K55.069 MESENTERIC VEIN THROMBOSIS (HCC): ICD-10-CM

## 2019-06-17 DIAGNOSIS — Z86.2 HISTORY OF COAGULOPATHY: ICD-10-CM

## 2019-06-17 LAB
25(OH)D3 SERPL-MCNC: 14.5 NG/ML (ref 30–100)
ALBUMIN SERPL-MCNC: 4 G/DL (ref 3.4–5)
ALBUMIN/GLOB SERPL: 1.1 {RATIO} (ref 0.8–1.7)
ALP SERPL-CCNC: 65 U/L (ref 45–117)
ALT SERPL-CCNC: 51 U/L (ref 13–56)
ANION GAP SERPL CALC-SCNC: 7 MMOL/L (ref 3–18)
AST SERPL-CCNC: 65 U/L (ref 15–37)
BASOPHILS # BLD: 0.1 K/UL (ref 0–0.1)
BASOPHILS NFR BLD: 1 % (ref 0–2)
BILIRUB SERPL-MCNC: 0.2 MG/DL (ref 0.2–1)
BUN SERPL-MCNC: 13 MG/DL (ref 7–18)
BUN/CREAT SERPL: 15 (ref 12–20)
CALCIUM SERPL-MCNC: 8.8 MG/DL (ref 8.5–10.1)
CHLORIDE SERPL-SCNC: 108 MMOL/L (ref 100–108)
CO2 SERPL-SCNC: 25 MMOL/L (ref 21–32)
CREAT SERPL-MCNC: 0.86 MG/DL (ref 0.6–1.3)
DIFFERENTIAL METHOD BLD: ABNORMAL
EOSINOPHIL # BLD: 0.2 K/UL (ref 0–0.4)
EOSINOPHIL NFR BLD: 2 % (ref 0–5)
ERYTHROCYTE [DISTWIDTH] IN BLOOD BY AUTOMATED COUNT: 13.2 % (ref 11.6–14.5)
GLOBULIN SER CALC-MCNC: 3.6 G/DL (ref 2–4)
GLUCOSE SERPL-MCNC: 78 MG/DL (ref 74–99)
HCT VFR BLD AUTO: 39.3 % (ref 35–45)
HGB BLD-MCNC: 12.8 G/DL (ref 12–16)
LYMPHOCYTES # BLD: 2.5 K/UL (ref 0.9–3.6)
LYMPHOCYTES NFR BLD: 28 % (ref 21–52)
MCH RBC QN AUTO: 31.7 PG (ref 24–34)
MCHC RBC AUTO-ENTMCNC: 32.6 G/DL (ref 31–37)
MCV RBC AUTO: 97.3 FL (ref 74–97)
MONOCYTES # BLD: 0.7 K/UL (ref 0.05–1.2)
MONOCYTES NFR BLD: 8 % (ref 3–10)
NEUTS SEG # BLD: 5.6 K/UL (ref 1.8–8)
NEUTS SEG NFR BLD: 61 % (ref 40–73)
PLATELET # BLD AUTO: 341 K/UL (ref 135–420)
PMV BLD AUTO: 9.5 FL (ref 9.2–11.8)
POTASSIUM SERPL-SCNC: 4.2 MMOL/L (ref 3.5–5.5)
PROT SERPL-MCNC: 7.6 G/DL (ref 6.4–8.2)
RBC # BLD AUTO: 4.04 M/UL (ref 4.2–5.3)
SODIUM SERPL-SCNC: 140 MMOL/L (ref 136–145)
WBC # BLD AUTO: 9 K/UL (ref 4.6–13.2)

## 2019-06-17 PROCEDURE — 82728 ASSAY OF FERRITIN: CPT

## 2019-06-17 PROCEDURE — 85025 COMPLETE CBC W/AUTO DIFF WBC: CPT

## 2019-06-17 PROCEDURE — 84425 ASSAY OF VITAMIN B-1: CPT

## 2019-06-17 PROCEDURE — 82607 VITAMIN B-12: CPT

## 2019-06-17 PROCEDURE — 82746 ASSAY OF FOLIC ACID SERUM: CPT

## 2019-06-17 PROCEDURE — 80053 COMPREHEN METABOLIC PANEL: CPT

## 2019-06-17 PROCEDURE — 82306 VITAMIN D 25 HYDROXY: CPT

## 2019-06-17 PROCEDURE — 83540 ASSAY OF IRON: CPT

## 2019-06-17 PROCEDURE — 36415 COLL VENOUS BLD VENIPUNCTURE: CPT

## 2019-06-18 LAB
FERRITIN SERPL-MCNC: 9 NG/ML (ref 8–388)
FOLATE SERPL-MCNC: 16.6 NG/ML (ref 3.1–17.5)
IRON SERPL-MCNC: 46 UG/DL (ref 50–175)
VIT B12 SERPL-MCNC: 473 PG/ML (ref 211–911)

## 2019-06-20 LAB — VIT B1 BLD-SCNC: 119.5 NMOL/L (ref 66.5–200)

## 2019-06-28 ENCOUNTER — CLINICAL SUPPORT (OUTPATIENT)
Dept: SURGERY | Age: 39
End: 2019-06-28

## 2019-06-28 VITALS — WEIGHT: 156 LBS | BODY MASS INDEX: 25.07 KG/M2 | HEIGHT: 66 IN

## 2019-06-28 DIAGNOSIS — Z86.2 HISTORY OF COAGULOPATHY: Primary | ICD-10-CM

## 2019-06-28 NOTE — PROGRESS NOTES
Dena Duong is a 46 y.o. female who is 11 years s/p GBP. The patient has lost 82 lbs since surgery. Body mass index is 25.18 kg/m². The patient has lost 81% EBW. Vitamins: centrum chewable, gets iron infusions, two prenatal gummy vitamins       Diet History:  Breakfast  What are you eating and how much? Protein shake or a banana or some toast due to her stomach pain            Snacks  What are you eating and how much? None            Hydration  What are you eating and how much? Coffee - caffeine helps with her migraines           Lunch  What are you eating and how much? Tries to do a meal - something with chicken or fish or shrimp - sometimes a lean cuisine            Snacks  What are you eating and how much? Tries to get a snack in: normally not too hungry due to nausea - might be crackers, sometimes a protein shake            Hydration  What are you eating and how much? gatorade or powerade - on a high salt diet - can't drink plain water via cardiologist, will drink green tea and lemon tea            Dinner  What are you eating and how much? Chicken, shrimp or fish with a vegetable            Snacks  What are you eating and how much? None            Hydration  What are you eating and how much? Regular gatorade, powerade            Hydration:  Calories:  Protein:  Carbohydrates:   Exercise:  Do you currently have an exercise routine? no and due to health issues, she does yoga within the last few weeks    Goals:   1.  For your MVI - switch to a swallow prenatal vitamin - take 2 a day   Add in B12 1000mcg a day  Add in calcium citrate - 3 doses at 500 mg a day   Add in biotin for hair skin and nails  Remove the centrum           F/u:

## 2019-06-28 NOTE — PATIENT INSTRUCTIONS
Goals: 1.  For your MVI - switch to a swallow prenatal vitamin - take 2 a day   Add in B12 1000mcg a day  Add in calcium citrate - 3 doses at 500 mg a day   Add in biotin for hair skin and nails  Remove the centrum

## 2019-07-22 NOTE — PROGRESS NOTES
Surgery Consultation    Subjective: The patient is a 46 y.o. obese female with a Body mass index is 23.08 kg/m². .  The patient had a laparoscopic gastric bypass procedure done approximatly 11 years ago in [place] via the [facility]. her starting weight prior to surgery was 238 lbs. she ultimately lost approximately 103 lbs with a subsequent weight regain of 8 lbs. her peak EBWL at 2 years out from surgery was 102% and now her current EBWL is 98%. her last bariatric follow-up was several years ago. Pancho Jaime notes that she had no issues in the immediate post-op phase but did require an operation in 2011 for a small bowel obstruction with what appears to be a mesenteric vein thrombosis. she currently is having the following issues related to his health: mild GERD and the need for routine follow-up. All of their prior evaluations available by both their PCP's and specialists physicians have been reviewed today either in the Care Everywhere portal or scanned under the media tab. I have spent a large portion of my initial consultation today reviewing the patients current dietary habits which have contributed to their health issues, weight regain and  their current obesity. They understand that generally speaking,  weight regain is  a function of resuming less that ideal dietary habits instead of being a procedural issue. They understand that older procedures are more likely to be associated with a less that perfect procedural result, such as a prior vertical banded gastroplasty or non divided gastric bypass. These procedures are more likely to result in staple line failures with resultant weight regain. This has been explained to the patient via diagrams of these older procedures and given to the patient. I have suggested to them personally a dietary regimen that they can initiate now to help with their status as it pertains to their weight.   They understand that the most important aspect of their journey through their weight loss endeavor will be their adherence to a new lifestyle of healthy eating behavior. They also understand that an adherence to an exercise program will not only help with weight loss but is ultimately important in weight maintenance. Patient Active Problem List    Diagnosis Date Noted    Intestinal malabsorption     Status post gastric bypass for obesity     Anticoagulated     Mesenteric vein thrombosis     Depression     Anxiety     Migraine     History of coagulopathy     Fibromyalgia     Mesenteric varices 09/28/2017    H/O gastric bypass 07/15/2017    Chronic abdominal pain 07/15/2017    Migraine headache 07/15/2017    Neurocardiogenic syncope 07/15/2017    Superior mesenteric vein thrombosis 62/18/4487    H/O umbilical hernia repair 60/87/6484      Past Surgical History:   Procedure Laterality Date    BREAST SURGERY PROCEDURE UNLISTED      breast reduction    EGD      HX BREAST BIOPSY Left     HX BREAST REDUCTION      HX CHOLECYSTECTOMY      HX CYSTECTOMY      HX GASTRIC BYPASS  2008    HX GI      choley    HX HERNIA REPAIR  2011,  2015,   2016    HX HERNIA REPAIR      abdominal wall X 3 with mesh    HX HYSTERECTOMY      HX KNEE ARTHROSCOPY Left 2013    HX KNEE ARTHROSCOPY      HX LAP CHOLECYSTECTOMY      H[place] GASTRIC [place] [facility] facility    HX OOPHORECTOMY Right     as well as tube with D&C    HX OTHER SURGICAL  2001    pilonidal cyst    HX OTHER SURGICAL      suspected TIPs procedure    HX OTHER SURGICAL      correction of suspected internal hernia / SBO post gastric bypass    VASCULAR SURGERY PROCEDURE UNLIST      intervential radiologist fixed blood cot in the abdomen      Social History     Tobacco Use    Smoking status: Never Smoker   Substance Use Topics    Alcohol use:  Yes     Alcohol/week: 1.0 standard drinks     Types: 1 Glasses of wine per week     Comment: 4-6 times a year      Family History   Problem Relation Age of Onset    Heart Disease Mother     Diabetes Father     Cancer Father       Current Outpatient Medications   Medication Sig Dispense Refill    polyethylene glycol (MIRALAX) 17 gram/dose powder Take 17 g by mouth daily. 1 tablespoon with 8 oz of water daily 289 g 0    dicyclomine (BENTYL) 10 mg capsule Take 2 Caps by mouth three (3) times daily. 20 Cap 0    rivaroxaban (XARELTO) 20 mg tab tablet Take 1 Tab by mouth daily (with dinner). Start from 9/30/2017 1 Tab 0    erenumab-aooe (AIMOVIG AUTOINJECTOR, 2 PACK,) 70 mg/mL atIn by SubCUTAneous route.  butalbital-acetaminophen-caffeine (FIORICET, ESGIC) -40 mg per tablet Take 1 Tab by mouth every six (6) hours as needed.  DULoxetine (CYMBALTA) 60 mg capsule Take 60 mg by mouth daily.  oxyCODONE-acetaminophen (PERCOCET)  mg per tablet Take  by mouth every four (4) hours as needed for Pain.  acetaminophen (TYLENOL EXTRA STRENGTH) 500 mg tablet Take 1,000 mg by mouth every six (6) hours as needed for Pain.  pregabalin (LYRICA) 150 mg capsule Take 150 mg by mouth two (2) times a day.  FLUoxetine (PROZAC) 20 mg capsule Take 20 mg by mouth daily.  ondansetron hcl (ZOFRAN) 4 mg tablet Take 4 mg by mouth every eight (8) hours as needed for Nausea.  alum-mag hydroxide-simeth (MYLANTA) 200-200-20 mg/5 mL susp Take 30 mL by mouth three (3) times daily (with meals). 354 mL 0    multivitamin, tx-iron-ca-min (THERA-M W/ IRON) 9 mg iron-400 mcg tab tablet Take 1 Tab by mouth daily.  topiramate (TOPAMAX) 100 mg tablet Take 100 mg by mouth two (2) times a day.  midodrine (PROAMITINE) 5 mg tablet Take 10 mg by mouth three (3) times daily.  Indications: Symptomatic Orthostatic Hypotension       No Known Allergies       Review of Systems:        General - No history or complaints of unexpected fever, chills, or weight loss  Head/Neck - No history or complaints of headache, diplopia, dysphagia, hearing loss  Cardiac - No history or complaints of chest pain, palpitations, murmur, or shortness of breath  Pulmonary - No history or complaints of shortness of breath, productive cough, hemoptysis  Gastrointestinal - No history or complaints of reflux,  abdominal pain, obstipation/constipation, blood per rectum  Genitourinary - No history or complaints of hematuria/dysuria, stress urinary incontinence symptoms, or renal lithiasis  Musculoskeletal - No history or complaints of joint pain or muscular weakness  Neurologic - No history or complaints of  migraine headaches, seizure activity, syncopal episodes, TIA or stroke  Integumentary - No history or complaints of rashes, abnormal nevi, skin cancer  Gynecological - No history of heavy menses/abnormal menses    Objective:     Visit Vitals  /71 (BP 1 Location: Right arm, BP Patient Position: Sitting)   Pulse 77   Temp 98.8 °F (37.1 °C)   Resp 16   Ht 5' 6\" (1.676 m)   Wt 64.9 kg (143 lb)   LMP 02/05/2018   SpO2 100%   BMI 23.08 kg/m²       Physical Examination: General appearance - alert, well appearing, and in no distress  Mental status - alert, oriented to person, place, and time  Eyes - pupils equal and reactive, extraocular eye movements intact  Ears - bilateral TM's and external ear canals normal  Nose - normal and patent, no erythema, discharge or polyps  Mouth - mucous membranes moist, pharynx normal without lesions  Neck - supple, no significant adenopathy  Lymphatics - no palpable lymphadenopathy, no hepatosplenomegaly  Chest - clear to auscultation, no wheezes, rales or rhonchi, symmetric air entry  Heart - normal rate, regular rhythm, normal S1, S2, no murmurs, rubs, clicks or gallops  Abdomen - soft, nontender, nondistended, no masses or organomegaly  Back exam - full range of motion, no tenderness, palpable spasm or pain on motion  Neurological - alert, oriented, normal speech, no focal findings or movement disorder noted  Musculoskeletal - no joint tenderness, deformity or swelling  Extremities - peripheral pulses normal, no pedal edema, no clubbing or cyanosis  Skin - normal coloration and turgor, no rashes, no suspicious skin lesions noted    Labs / Old Records:     Lab Results   Component Value Date/Time    WBC 9.0 06/17/2019 04:14 PM    HGB 12.8 06/17/2019 04:14 PM    HCT 39.3 06/17/2019 04:14 PM    PLATELET 564 94/91/1381 04:14 PM    MCV 97.3 (H) 06/17/2019 04:14 PM     Lab Results   Component Value Date/Time    Sodium 140 06/17/2019 04:14 PM    Potassium 4.2 06/17/2019 04:14 PM    Chloride 108 06/17/2019 04:14 PM    CO2 25 06/17/2019 04:14 PM    Anion gap 7 06/17/2019 04:14 PM    Glucose 78 06/17/2019 04:14 PM    BUN 13 06/17/2019 04:14 PM    Creatinine 0.86 06/17/2019 04:14 PM    BUN/Creatinine ratio 15 06/17/2019 04:14 PM    GFR est AA >60 06/17/2019 04:14 PM    GFR est non-AA >60 06/17/2019 04:14 PM    Calcium 8.8 06/17/2019 04:14 PM    Bilirubin, total 0.2 06/17/2019 04:14 PM    AST (SGOT) 65 (H) 06/17/2019 04:14 PM    Alk. phosphatase 65 06/17/2019 04:14 PM    Protein, total 7.6 06/17/2019 04:14 PM    Albumin 4.0 06/17/2019 04:14 PM    Globulin 3.6 06/17/2019 04:14 PM    A-G Ratio 1.1 06/17/2019 04:14 PM    ALT (SGPT) 51 06/17/2019 04:14 PM     Lab Results   Component Value Date/Time    Iron 46 (L) 06/17/2019 04:14 PM    Ferritin 9 06/17/2019 04:14 PM     Lab Results   Component Value Date/Time    Folate 16.6 06/17/2019 04:14 PM     Lab Results   Component Value Date/Time    Vitamin D 25-Hydroxy 14.5 (L) 06/17/2019 04:15 PM             Old operative reports reviewed if available and are scanned under the media tab or reviewed under Care Everywhere        Assessment:     Status post laparoscopic gastric bypass procedure approximately 11 years ago with need for SBO operation in 2011 with subsequent mesenteric vein thrombosis. I have spent a great deal of time discussing the patients history of MVT. Plan: 1. Nutrition-  I have discussed in detail the pitfalls in diet that have contributed to their weight regain and the importance of adhering to a lifelong regimen of dietary goals and proper eating habits. I have discussed the proper lifelong bariatric diet  in detail spending in excess of 20 minutes discussing this. We will schedule them for a dietary consultation with our nutritionist and urge them to continue on a regular follow-up schedule with her. 2.Maintenance vitamins- Today we have discussed the importance of vitamins as it pertains to their procedure and we will obtain appropriate lab to check all levels. They have been provided a handout regarding this today. Total time spent with the patient reviewing their complex history of bariatric surgery,diet, and plan is in excess of 60 minutes.       Secondary Diagnoses:         Signed By: KIM Wang     July 22, 2019

## 2019-07-22 NOTE — PATIENT INSTRUCTIONS

## 2019-12-03 ENCOUNTER — HOSPITAL ENCOUNTER (OUTPATIENT)
Dept: LAB | Age: 39
Discharge: HOME | End: 2019-12-03
Payer: OTHER GOVERNMENT

## 2019-12-03 LAB
ANION GAP SERPL CALC-SCNC: 5 MMOL/L (ref 3–18)
BASOPHILS # BLD: 0.1 K/UL (ref 0–0.1)
BASOPHILS NFR BLD: 1 % (ref 0–2)
BUN SERPL-MCNC: 8 MG/DL (ref 7–18)
BUN/CREAT SERPL: 10 (ref 12–20)
CALCIUM SERPL-MCNC: 9.2 MG/DL (ref 8.5–10.1)
CHLORIDE SERPL-SCNC: 109 MMOL/L (ref 100–111)
CO2 SERPL-SCNC: 24 MMOL/L (ref 21–32)
CREAT SERPL-MCNC: 0.78 MG/DL (ref 0.6–1.3)
DIFFERENTIAL METHOD BLD: ABNORMAL
EOSINOPHIL # BLD: 0.1 K/UL (ref 0–0.4)
EOSINOPHIL NFR BLD: 2 % (ref 0–5)
ERYTHROCYTE [DISTWIDTH] IN BLOOD BY AUTOMATED COUNT: 13 % (ref 11.6–14.5)
GLUCOSE SERPL-MCNC: 79 MG/DL (ref 74–99)
HCT VFR BLD AUTO: 39 % (ref 35–45)
HGB BLD-MCNC: 12.6 G/DL (ref 12–16)
INR PPP: 1.3 (ref 0.8–1.2)
LYMPHOCYTES # BLD: 1.8 K/UL (ref 0.9–3.6)
LYMPHOCYTES NFR BLD: 29 % (ref 21–52)
MCH RBC QN AUTO: 30.9 PG (ref 24–34)
MCHC RBC AUTO-ENTMCNC: 32.3 G/DL (ref 31–37)
MCV RBC AUTO: 95.6 FL (ref 74–97)
MONOCYTES # BLD: 0.5 K/UL (ref 0.05–1.2)
MONOCYTES NFR BLD: 8 % (ref 3–10)
NEUTS SEG # BLD: 3.8 K/UL (ref 1.8–8)
NEUTS SEG NFR BLD: 60 % (ref 40–73)
PLATELET # BLD AUTO: 348 K/UL (ref 135–420)
PMV BLD AUTO: 9.6 FL (ref 9.2–11.8)
POTASSIUM SERPL-SCNC: 4.1 MMOL/L (ref 3.5–5.5)
PROTHROMBIN TIME: 15.5 SEC (ref 11.5–15.2)
RBC # BLD AUTO: 4.08 M/UL (ref 4.2–5.3)
SODIUM SERPL-SCNC: 138 MMOL/L (ref 136–145)
WBC # BLD AUTO: 6.3 K/UL (ref 4.6–13.2)

## 2019-12-03 PROCEDURE — 36415 COLL VENOUS BLD VENIPUNCTURE: CPT

## 2019-12-03 PROCEDURE — 85025 COMPLETE CBC W/AUTO DIFF WBC: CPT

## 2019-12-03 PROCEDURE — 85610 PROTHROMBIN TIME: CPT

## 2019-12-03 PROCEDURE — 80048 BASIC METABOLIC PNL TOTAL CA: CPT

## 2019-12-04 LAB
FAX TO INFO,FAXT: NORMAL
FAX TO NUMBER,FAXN: NORMAL

## 2019-12-11 ENCOUNTER — HOSPITAL ENCOUNTER (OUTPATIENT)
Age: 39
Setting detail: OUTPATIENT SURGERY
Discharge: HOME | End: 2019-12-11
Attending: SPECIALIST | Admitting: SPECIALIST
Payer: OTHER GOVERNMENT

## 2019-12-11 ENCOUNTER — APPOINTMENT (OUTPATIENT)
Dept: GENERAL RADIOLOGY | Age: 39
End: 2019-12-11
Attending: SPECIALIST
Payer: OTHER GOVERNMENT

## 2019-12-11 VITALS
WEIGHT: 151.7 LBS | HEART RATE: 80 BPM | RESPIRATION RATE: 16 BRPM | DIASTOLIC BLOOD PRESSURE: 80 MMHG | BODY MASS INDEX: 24.38 KG/M2 | SYSTOLIC BLOOD PRESSURE: 126 MMHG | OXYGEN SATURATION: 98 % | HEIGHT: 66 IN | TEMPERATURE: 97.5 F

## 2019-12-11 PROCEDURE — 74011000255 HC RX REV CODE- 255: Performed by: SPECIALIST

## 2019-12-11 PROCEDURE — 74240 X-RAY XM UPR GI TRC 1CNTRST: CPT

## 2019-12-11 PROCEDURE — 77030040361 HC SLV COMPR DVT MDII -B: Performed by: SPECIALIST

## 2019-12-11 PROCEDURE — 76040000019: Performed by: SPECIALIST

## 2019-12-11 NOTE — PROCEDURES
11.5 years post lap gastric bypass in [place] via the [facility] with history of subsequent SBO and mesenteric vein thrombosis - see in the office in June for consultation of \"pain and nausea\". UGI today with abnormal findings - will need CT scan.   See dictation

## 2019-12-12 NOTE — OP NOTES
[facility] [place]  OPERATIVE REPORT    Name:  Anshu Michel  MR#:   [id number]  :  1980  ACCOUNT #:  [de-identified]  DATE OF SERVICE:  2019    PREOPERATIVE DIAGNOSES:  The patient is 12 years out from laparoscopic gastric bypass procedure performed in [place] with chronic abdominal pain and history of a mesenteric vein thrombosis. POSTOPERATIVE DIAGNOSES:  The patient is 12 years out from laparoscopic gastric bypass procedure performed in [place] with chronic abdominal pain and history of a mesenteric vein thrombosis with abnormal lucency in the right lower quadrant of the abdomen. PROCEDURE PERFORMED:  Upper gastrointestinal study with barium. SURGEON:  Colby. ASSISTANT:  None. ANESTHESIA:  None. COMPLICATIONS:  None. SPECIMENS REMOVED:  None. IMPLANTS:  None. ESTIMATED BLOOD LOSS:  None. STATEMENT OF MEDICAL NECESSITY:  The patient is a 44-year-old female who had a laparoscopic gastric bypass procedure performed in [place], if you did not know. She had complication of an internal hernia which led to chronic mesenteric vein thrombosis. She has been on chronic anticoagulation since that time. She has chronic abdominal pain, presented to me in consultation for evaluation of this, and she is here today for upper GI study to assess her bypass. PROCEDURE:  The patient was brought to Fluoro Unit where she was given thin barium. On swallowing barium, she was noted to have normal peristalsis of her esophagus. She had prompt filling of the distal esophagus with tapering into and through the gastroesophageal junction. Contrast then filled a tiny gastric pouch, which was normal in size and caliber. Contrast then filled the Damon limb which was nondilated. The Damon limb was appropriately positioned on the left side of the abdomen. Contrast flowed in the distal Damon limb then into the efferent limb in normal fashion.   The only thing abnormal on x-ray was she had two findings. She had coils in her right liver. She also had some type of lucency in her right lower quadrant of unclear etiology and it was very dense in character, very linear in nature with almost a twisting effect, and this was noted on a prior CT scan of the abdomen in 04/2019, but no description of what the radiologist thought what this was, was given. At this juncture, I will go ahead and review her CT with radiologists to see what their opinion is on this, not sure it is related to her mesenteric vein thrombosis with similar shape and form, but we will go ahead and discuss with the radiologists to see if there are any other diagnoses, then gastric sleeve could be performed.       Marta Peoples MD      [id number]  D:  12/11/2019 16:24  T:  12/12/2019 0:21  JOB #:  [id number]

## 2020-02-26 ENCOUNTER — OFFICE VISIT (OUTPATIENT)
Dept: HEMATOLOGY | Age: 40
End: 2020-02-26

## 2020-02-26 ENCOUNTER — HOSPITAL ENCOUNTER (OUTPATIENT)
Dept: LAB | Age: 40
Discharge: HOME | End: 2020-02-26
Payer: OTHER GOVERNMENT

## 2020-02-26 ENCOUNTER — OFFICE VISIT (OUTPATIENT)
Dept: SURGERY | Age: 40
End: 2020-02-26

## 2020-02-26 VITALS
HEART RATE: 90 BPM | WEIGHT: 150 LBS | DIASTOLIC BLOOD PRESSURE: 68 MMHG | SYSTOLIC BLOOD PRESSURE: 108 MMHG | OXYGEN SATURATION: 98 % | BODY MASS INDEX: 24.11 KG/M2 | HEIGHT: 66 IN | TEMPERATURE: 99.4 F

## 2020-02-26 VITALS
HEART RATE: 79 BPM | SYSTOLIC BLOOD PRESSURE: 114 MMHG | HEIGHT: 66 IN | WEIGHT: 151.38 LBS | DIASTOLIC BLOOD PRESSURE: 78 MMHG | TEMPERATURE: 97.6 F | OXYGEN SATURATION: 100 % | RESPIRATION RATE: 16 BRPM | BODY MASS INDEX: 24.33 KG/M2

## 2020-02-26 DIAGNOSIS — K55.069 MESENTERIC VEIN THROMBOSIS (HCC): ICD-10-CM

## 2020-02-26 DIAGNOSIS — K90.9 INTESTINAL MALABSORPTION, UNSPECIFIED TYPE: ICD-10-CM

## 2020-02-26 DIAGNOSIS — R10.30 LOWER ABDOMINAL PAIN: Primary | ICD-10-CM

## 2020-02-26 DIAGNOSIS — Z98.84 STATUS POST GASTRIC BYPASS FOR OBESITY: ICD-10-CM

## 2020-02-26 DIAGNOSIS — Z98.84 H/O GASTRIC BYPASS: ICD-10-CM

## 2020-02-26 DIAGNOSIS — I82.890 SPLENIC VEIN THROMBOSIS: ICD-10-CM

## 2020-02-26 DIAGNOSIS — I82.890 SPLENIC VEIN THROMBOSIS: Primary | ICD-10-CM

## 2020-02-26 LAB
ALBUMIN SERPL-MCNC: 4 G/DL (ref 3.4–5)
ALBUMIN/GLOB SERPL: 1.2 {RATIO} (ref 0.8–1.7)
ALP SERPL-CCNC: 68 U/L (ref 45–117)
ALT SERPL-CCNC: 14 U/L (ref 13–56)
ANION GAP SERPL CALC-SCNC: 5 MMOL/L (ref 3–18)
AST SERPL-CCNC: 15 U/L (ref 10–38)
BASOPHILS # BLD: 0.1 K/UL (ref 0–0.1)
BASOPHILS NFR BLD: 1 % (ref 0–2)
BILIRUB DIRECT SERPL-MCNC: <0.1 MG/DL (ref 0–0.2)
BILIRUB SERPL-MCNC: 0.2 MG/DL (ref 0.2–1)
BUN SERPL-MCNC: 9 MG/DL (ref 7–18)
BUN/CREAT SERPL: 10 (ref 12–20)
CALCIUM SERPL-MCNC: 9.1 MG/DL (ref 8.5–10.1)
CHLORIDE SERPL-SCNC: 111 MMOL/L (ref 100–111)
CO2 SERPL-SCNC: 25 MMOL/L (ref 21–32)
CREAT SERPL-MCNC: 0.87 MG/DL (ref 0.6–1.3)
DIFFERENTIAL METHOD BLD: ABNORMAL
EOSINOPHIL # BLD: 0.1 K/UL (ref 0–0.4)
EOSINOPHIL NFR BLD: 2 % (ref 0–5)
ERYTHROCYTE [DISTWIDTH] IN BLOOD BY AUTOMATED COUNT: 13.7 % (ref 11.6–14.5)
GLOBULIN SER CALC-MCNC: 3.4 G/DL (ref 2–4)
GLUCOSE SERPL-MCNC: 45 MG/DL (ref 74–99)
HCT VFR BLD AUTO: 38.9 % (ref 35–45)
HGB BLD-MCNC: 12.1 G/DL (ref 12–16)
INR PPP: 1.6 (ref 0.8–1.2)
LYMPHOCYTES # BLD: 2.4 K/UL (ref 0.9–3.6)
LYMPHOCYTES NFR BLD: 30 % (ref 21–52)
MCH RBC QN AUTO: 29.4 PG (ref 24–34)
MCHC RBC AUTO-ENTMCNC: 31.1 G/DL (ref 31–37)
MCV RBC AUTO: 94.4 FL (ref 74–97)
MONOCYTES # BLD: 0.7 K/UL (ref 0.05–1.2)
MONOCYTES NFR BLD: 9 % (ref 3–10)
NEUTS SEG # BLD: 4.7 K/UL (ref 1.8–8)
NEUTS SEG NFR BLD: 58 % (ref 40–73)
PLATELET # BLD AUTO: 405 K/UL (ref 135–420)
PMV BLD AUTO: 10.2 FL (ref 9.2–11.8)
POTASSIUM SERPL-SCNC: 4 MMOL/L (ref 3.5–5.5)
PROT SERPL-MCNC: 7.4 G/DL (ref 6.4–8.2)
PROTHROMBIN TIME: 18.8 SEC (ref 11.5–15.2)
RBC # BLD AUTO: 4.12 M/UL (ref 4.2–5.3)
SODIUM SERPL-SCNC: 141 MMOL/L (ref 136–145)
WBC # BLD AUTO: 8 K/UL (ref 4.6–13.2)

## 2020-02-26 PROCEDURE — 85025 COMPLETE CBC W/AUTO DIFF WBC: CPT

## 2020-02-26 PROCEDURE — 80076 HEPATIC FUNCTION PANEL: CPT

## 2020-02-26 PROCEDURE — 36415 COLL VENOUS BLD VENIPUNCTURE: CPT

## 2020-02-26 PROCEDURE — 80048 BASIC METABOLIC PNL TOTAL CA: CPT

## 2020-02-26 PROCEDURE — 85610 PROTHROMBIN TIME: CPT

## 2020-02-26 RX ORDER — GABAPENTIN 300 MG/1
300 CAPSULE ORAL 3 TIMES DAILY
COMMUNITY

## 2020-02-26 NOTE — PROGRESS NOTES
Valencia Bhakta presents today for   Chief Complaint   Patient presents with    Follow-up     Pt is here today for her follow up. Pt stated that she is still having abd pain for a couple of years now. Pt stated that the pain comes and goes. Is someone accompanying this pt? no    Is the patient using any DME equipment during [address]? no    Depression Screening:  3 most recent PHQ Screens 2/26/2020   PHQ Not Done -   Little interest or pleasure in doing things Not at all   Feeling down, depressed, irritable, or hopeless Not at all   Total Score PHQ 2 0       Learning Assessment:  Learning Assessment 2/26/2020   PRIMARY LEARNER Patient   HIGHEST LEVEL OF EDUCATION - PRIMARY LEARNER  GRADUATED HIGH SCHOOL OR GED   BARRIERS PRIMARY LEARNER NONE   CO-LEARNER CAREGIVER No   PRIMARY LANGUAGE ENGLISH    NEED No   LEARNER PREFERENCE PRIMARY DEMONSTRATION   LEARNING SPECIAL TOPICS no   ANSWERED BY patient   RELATIONSHIP SELF       Abuse Screening:  Abuse Screening Questionnaire 2/26/2020   Do you ever feel afraid of your partner? N   Are you in a relationship with someone who physically or mentally threatens you? N   Is it safe for you to go home? Y       Fall Risk  Fall Risk Assessment, last 12 mths 6/19/2017   Able to walk? Yes   Fall in past 12 months? Yes   Fall with injury? No   Number of falls in past 12 months 3   Fall Risk Score 3         Coordination of Care:  1. Have you been to the ER, urgent care clinic since your last visit? Hospitalized since your last visit? no    2. Have you seen or consulted any other health care providers outside of the [address] [place] [address] [place] since your last visit? Include any pap smears or colon screening.  no

## 2020-02-26 NOTE — PROGRESS NOTES
Nory Matamoros is a 46 y.o. female  Chief Complaint   Patient presents with    Follow-up         Visit Vitals  /68 (BP 1 Location: Left arm, BP Patient Position: Sitting)   Pulse 90   Temp 99.4 °F (37.4 °C) (Tympanic)   Ht 5' 6\" (1.676 m)   Wt 150 lb (68 kg)   SpO2 98%   BMI 24.21 kg/m²     3 most recent PHQ Screens 2/26/2020   PHQ Not Done -   Little interest or pleasure in doing things Not at all   Feeling down, depressed, irritable, or hopeless Not at all   Total Score PHQ 2 0     Learning Assessment 2/26/2020   PRIMARY LEARNER Patient   HIGHEST LEVEL OF EDUCATION - PRIMARY LEARNER  GRADUATED HIGH SCHOOL OR GED   BARRIERS PRIMARY LEARNER NONE   CO-LEARNER CAREGIVER No   PRIMARY LANGUAGE ENGLISH    NEED No   LEARNER PREFERENCE PRIMARY DEMONSTRATION   LEARNING SPECIAL TOPICS no   ANSWERED BY patient   RELATIONSHIP SELF     Abuse Screening Questionnaire 2/26/2020   Do you ever feel afraid of your partner? N   Are you in a relationship with someone who physically or mentally threatens you? N   Is it safe for you to go home? Y         1. Have you been to the ER, urgent care clinic since your last visit? Hospitalized since your last visit? No    2. Have you seen or consulted any other health care providers outside of the [address] [place] [address] since your last visit? Include any pap smears or colon screening.  No

## 2020-02-26 NOTE — Clinical Note
3/8/20 Patient: Sally Durant YOB: 1980 Date of Visit: 2/26/2020 Christine Nieves PA-C 
[address] [address] VIA Facsimile: [phone] Dear Christine Nieves PA-C, Thank you for referring Ms. Stephany Saunders to [address] for evaluation. My notes for this consultation are attached. If you have questions, please do not hesitate to call me. I look forward to following your patient along with you. Sincerely, Lang Oneal MD

## 2020-02-26 NOTE — PROGRESS NOTES
[address] [place], [place] [address]MD Gussie Ar, MD Chip Bi, PA-C Freida Ano, [facility]-BC     April S Janae, [facility]   Jatin Rogers RAFY-ROBBIN Moncada, [facility]       Raffaele Segundo [facility] 136    at [facility] AT [place]    [address] [facility], [address] [place] [address], [facility] 22.    [phone]    FAX: [address] [place] [address], 300 [facility] - [address]    [phone]    FAX: [phone]       Patient Care Team:  Kandi Sacks, [place] as PCP - General (Physician Assistant)  Bridger Gresham MD (Internal Medicine)      Problem List  Date Reviewed: 7/22/2019          Codes Class Noted    Intestinal malabsorption ICD-10-CM: K90.9  ICD-9-CM: 579.9  Unknown        Status post gastric bypass for obesity ICD-10-CM: Z98.84  ICD-9-CM: V45.86  Unknown        Anticoagulated ICD-10-CM: Z79.01  ICD-9-CM: V58.61  Unknown        Mesenteric vein thrombosis ICD-10-CM: V83  ICD-9-CM: 557.0  Unknown    Overview Signed 6/17/2019  3:12 PM by KIM John     spontaneous / anticoagulated for life             Depression ICD-10-CM: F32.9  ICD-9-CM: 311  Unknown        Anxiety ICD-10-CM: F41.9  ICD-9-CM: 300.00  Unknown        Migraine ICD-10-CM: J69.455  ICD-9-CM: 346.90  Unknown        History of coagulopathy ICD-10-CM: Z86.2  ICD-9-CM: V12.3  Unknown    Overview Signed 6/17/2019  3:18 PM by KIM John     factor 5 liden              Fibromyalgia ICD-10-CM: M79.7  ICD-9-CM: 729.1  Unknown        Mesenteric varices ICD-10-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  ICD-9-CM: 456.8  9/28/2017        H/O gastric bypass ICD-10-CM: Z98.84  ICD-9-CM: V45.86  7/15/2017    Overview Signed 7/15/2017  8:12 PM by Kathy Wilson MD     2008             Chronic abdominal pain ICD-10-CM: R10.9, H94.22  ICD-9-CM: 789.00, 338.29  7/15/2017        Migraine headache ICD-10-CM: U96.622  ICD-9-CM: 346.90  7/15/2017        Neurocardiogenic syncope ICD-10-CM: R55  ICD-9-CM: 780.2  7/15/2017        Superior mesenteric vein thrombosis ICD-10-CM: KCS0256  ICD-9-CM: 557.0  4/74/5693        H/O umbilical hernia repair WAJ-31-VM: Z98.890, Z87.19  ICD-9-CM: V15.29  7/15/2017                Pau Dubon returns to the [address] for management of abdominal pain and SMV thrombosis. The active problem list, all pertinent past medical history, medications, endoscopic studies, radiologic findings and laboratory findings related to the liver disorder were reviewed with the patient. The patient is a 46 y.o.  female who underwent a gastric bypass in 2008. She lost about 100 pounds after the procedure. An MRI of the abdomen from 2015 demonstrated SMV thrombosis and a collateral shunt between the SMV and the Splenic vein. No evidence of PV thrombosis was seen. She developed abdominal pain in about 3/2016. EGD was performed in 6/2016. This demonstrated no ulcer in the gastric pouch. Repeat MRI in 6/2017 demonstrated patent SMV and a large collateral/shunt connecting the SMV and splenic vein. We had thought this collateral could be causing vascular steal and contributing to the abdominal pain. She underwent coil embolization of messenteric varices in 9/2017. The pain seemed to improve following this procedure but the recurred. We referred her to GI thining this was functional GI disorder. She has also been seen by GI at [facility]. A repeat CT scan was performed in 1/2018. Embolization coils are in the area of the previously noted shunt. An assessment of liver fibrosis with biopsy or elastography has not been performed. This is the first appointment at [place] [address] since 1/2018.     The patient notes ongoing abdominal pain     The patient has not experienced fevers, chills, nausea, vomiting,     The patient has severe limitations in functional activities secondary to abdominal pain. ASSESSMENT AND PLAN:  SMV Thrombosis  She was found to have SMV thrombosis by MRI in 2015. This was 7 years after gastric bypass surgery which was done in 2008. She developed abdominal pain in 3/2016. This was thought to be due to a shunt between the SMV and SV seen on the MRI. This could not be identified by angiogram.  SMV varix seen by angiogram.  The varix was embolized. She had improvement in abdominal pain for several years although she says this never went fully away. The lvier enzymes have always been normal.  Liver function is normal.  The platelet count is normal.      I suggested she see GI in 2018 as the pain was likely functional.  Possibly IBS. She was treated with various pain, GI and neurologic medications. Will repeat triple phase CT and see what the mesenteric vasculature looks like. ALLERGIES  No Known Allergies    MEDICATIONS  Current Outpatient Medications   Medication Sig    gabapentin (NEURONTIN) 300 mg capsule Take 300 mg by mouth three (3) times daily.  polyethylene glycol (MIRALAX) 17 gram/dose powder Take 17 g by mouth daily. 1 tablespoon with 8 oz of water daily    dicyclomine (BENTYL) 10 mg capsule Take 2 Caps by mouth three (3) times daily.  rivaroxaban (XARELTO) 20 mg tab tablet Take 1 Tab by mouth daily (with dinner). Start from 9/30/2017    erenumab-aooe (AIMOVIG AUTOINJECTOR, 2 PACK,) 70 mg/mL atIn by SubCUTAneous route.  butalbital-acetaminophen-caffeine (FIORICET, ESGIC) -40 mg per tablet Take 1 Tab by mouth every six (6) hours as needed.  DULoxetine (CYMBALTA) 60 mg capsule Take 60 mg by mouth daily.  oxyCODONE-acetaminophen (PERCOCET)  mg per tablet Take  by mouth every four (4) hours as needed for Pain.     acetaminophen (TYLENOL EXTRA STRENGTH) 500 mg tablet Take 1,000 mg by mouth every six (6) hours as needed for Pain.  pregabalin (LYRICA) 150 mg capsule Take 150 mg by mouth two (2) times a day.  FLUoxetine (PROZAC) 20 mg capsule Take 20 mg by mouth daily.  ondansetron hcl (ZOFRAN) 4 mg tablet Take 4 mg by mouth every eight (8) hours as needed for Nausea.  alum-mag hydroxide-simeth (MYLANTA) 200-200-20 mg/5 mL susp Take 30 mL by mouth three (3) times daily (with meals).  multivitamin, tx-iron-ca-min (THERA-M W/ IRON) 9 mg iron-400 mcg tab tablet Take 1 Tab by mouth daily.  topiramate (TOPAMAX) 100 mg tablet Take 100 mg by mouth two (2) times a day.  midodrine (PROAMITINE) 5 mg tablet Take 10 mg by mouth three (3) times daily. Indications: Symptomatic Orthostatic Hypotension     No current facility-administered medications for this visit. SYSTEM REVIEW NOT RELATED TO LIVER DISEASE OR REVIEWED ABOVE:  Constitution systems: Negative for fever, chills, weight gain, weight loss. Eyes: Negative for visual changes. ENT: Negative for sore throat, painful swallowing. Respiratory: Negative for cough, hemoptysis, SOB. Cardiology: Negative for chest pain, palpitations. GI:  Negative for constipation or diarrhea. : Negative for urinary frequency, dysuria, hematuria, nocturia. Skin: Negative for rash. Hematology: Negative for easy bruising, blood clots. Musculo-skelatal: Negative for back pain, muscle pain, weakness. Neurologic: Negative for headaches, dizziness, vertigo, memory problems not related to HE. Psychology: Negative for anxiety, depression. FAMILY HISTORY:  The father  of DM and MI. The mother  of heart disease and a CVA. There is no family history of liver disease. SOCIAL HISTORY:  The patient is . The patient has 2 children,   The patient has never used tobacco products. The patient has never consumed significant amounts of alcohol. The patient has not work. The patient is currently receiving disability. PHYSICAL EXAMINATION:  Visit Vitals  /68 (BP 1 Location: Left arm, BP Patient Position: Sitting)   Pulse 90   Temp 99.4 °F (37.4 °C) (Tympanic)   Ht 5' 6\" (1.676 m)   Wt 150 lb (68 kg)   LMP 02/05/2018   SpO2 98%   BMI 24.21 kg/m²     General: No acute distress. Eyes: Sclera anicteric. ENT: No oral lesions. Thyroid normal.  Nodes: No adenopathy. Skin: No spider angiomata. No jaundice. No palmar erythema. Respiratory: Lungs clear to auscultation. Cardiovascular: Regular heart rate. No murmurs. No JVD. Abdomen: Soft non-tender. Liver size normal to percussion/palpation. Spleen not palpable. No obvious ascites. Extremities: No edema. No muscle wasting. No gross arthritic changes. Neurologic: Alert and oriented. Cranial nerves grossly intact. No asterixis. LABORATORY STUDIES:  Liver [place] of [address] [place] [address] & Units 2/26/2020 12/3/2019   WBC 4.6 - 13.2 K/uL 8.0 6.3   ANC 1.8 - 8.0 K/UL 4.7 3.8   HGB 12.0 - 16.0 g/dL 12.1 12.6    - 420 K/uL 405 348   INR 0.8 - 1.2   1.6 (H) 1.3 (H)   AST 10 - 38 U/L 15    ALT 13 - 56 U/L 14    Alk Phos 45 - 117 U/L 68    Bili, Total 0.2 - 1.0 MG/DL 0.2    Bili, Direct 0.0 - 0.2 MG/DL <0.1    Albumin 3.4 - 5.0 g/dL 4.0    BUN 7.0 - 18 MG/DL 9 8   Creat 0.6 - 1.3 MG/DL 0.87 0.78   Na 136 - 145 mmol/L 141 138   K 3.5 - 5.5 mmol/L 4.0 4.1   Cl 100 - 111 mmol/L 111 109   CO2 21 - 32 mmol/L 25 24   Glucose 74 - 99 mg/dL 45 (LL) 79       SEROLOGIES:  Not available or performed. Testing was performed today. LIVER HISTOLOGY:  Not available or performed    ENDOSCOPIC PROCEDURES:  1/2018. EGD by [facility]. Small gastric pouch. Hemocystic spot with no ulcer. RADIOLOGY:  6/2017. MRI of liver. Normal appearing liver. No liver mass lesions. Normal spleen. No dilated bile ducts. No bile duct strictures. No ascites. No evidence for SMV thrombosis. Collaterial between branch of SMV and splenic vein. 9/2017.   CT scan abdomen with IV contrast.  Normal appearing liver. No liver mass lesions. Normal spleen. No ascites. OTHER TESTIN2017. Transhepatic mesenteric venogram.  Patent PV, SMV, IMV and splenic vein. No shunt between the SMV and SV was identified. Large mesenteric varix. Coil embolization of mesenteric varix. 10/2017. HIDA scan. Normal.    FOLLOW-UP:  All of the issues listed above in the Assessment and Plan were discussed with the patient. All questions were answered. The patient expressed a clear understanding of the above. [facility] 87 in 4 weeks which should be 1-2 weeks after the next imaging study.         Santiago Steinberg MD  [address]  [address], 8[address] [facility], [address]  [address] [place] [address]

## 2020-02-27 NOTE — PROGRESS NOTES
Surgery Consultation    History of Present Illness: The patient is a 46 y.o. obese female with a Body mass index is 23.08 kg/m². .  The patient had a laparoscopic gastric bypass procedure done approximatly 11 years ago in [place] via the [employer]. her starting weight prior to surgery was 238 lbs. she ultimately lost approximately 103 lbs with a subsequent weight regain of 8 lbs. her peak EBWL at 2 years out from surgery was 102% and now her current EBWL is 98%. her last bariatric follow-up was several years ago. Kim Nicki notes that she had no issues in the immediate post-op phase but did require an operation in 2011 for a small bowel obstruction with what appears to be a mesenteric vein thrombosis. Her records not available for us to review however it sounds like she experienced an internal hernia with the subsequent mesenteric vein thrombosis requiring some type of radiologic procedure in addition to her surgical correction of such. she currently is having chronic right lower quadrant abdominal pain. She does not experience any particular emesis with the pain but it is fairly consistent in nature. We saw her in consultation several months ago and an upper GI study was obtained which showed a normal gastric pouch and Damon limb. We did see the radiologic substance which was present in her mesenteric system and I confirmed that with the radiologist.  We have requested all of her old operative reports but to date have only received her initial gastric bypass procedure which reads like a typical operation. We have had no luck in obtaining any records related to her small bowel obstruction however that was performed outside of the [employer] system and those requests have been made. Patient denies any chronic emesis, chronic diarrhea, chronic constipation, or other symptoms other than the abdominal pain.           Past Medical History:   Diagnosis Date    Anemia     iron infusions-every 6 months    Anticoagulated     Anxiety     Arthritis     osteo-knee    Chronic pain     abdominal pain    Coagulation disorder (HCC)     Depression     Fibromyalgia     History of coagulopathy     factor 5 liden     Intestinal malabsorption     Mesenteric vein thrombosis     spontaneous / anticoagulated for life    Migraine     PUD (peptic ulcer disease) 2007    H/O    [place] for [place] [facility] facility    Thromboembolus [facility])     DVT x 2 possibly due to numerous surgeries mesenteric vein     Past Surgical History:   Procedure Laterality Date    EGD      HX BREAST BIOPSY Left     HX BREAST REDUCTION      HX HERNIA REPAIR      abdominal wall X 3 with mesh    HX HYSTERECTOMY      HX KNEE ARTHROSCOPY Left 2013    HX LAP CHOLECYSTECTOMY      HX LAP GASTRIC [place]PASS  12[place] [facility] facility    HX OOPHORECTOMY Right     as well as tube with D&C    HX OTHER SURGICAL  2001    pilonidal cyst    HX OTHER SURGICAL      suspected TIPs procedure    HX OTHER SURGICAL      correction of suspected internal hernia / SBO post gastric bypass    VASCULAR SURGERY PROCEDURE UNLIST      intervential radiologist fixed blood cot in the abdomen      Family History   Problem Relation Age of Onset    Heart Disease Mother     Diabetes Father     Cancer Father      Social History     Socioeconomic History    Marital status:      Spouse name: Not on file    Number of children: Not on file    Years of education: Not on file    Highest education level: Not on file   Tobacco Use    Smoking status: Never Smoker    Smokeless tobacco: Never Used   Substance and Sexual Activity    Alcohol use:  Yes     Alcohol/week: 1.0 standard drinks     Types: 1 Glasses of wine per week     Comment: 4-6 times a year    Drug use: No    Sexual activity: Never   Social History Narrative    ** Merged History Encounter **           Current Outpatient Medications   Medication Sig    gabapentin (NEURONTIN) 300 mg capsule Take 300 mg by mouth three (3) times daily.  dicyclomine (BENTYL) 10 mg capsule Take 2 Caps by mouth three (3) times daily.  rivaroxaban (XARELTO) 20 mg tab tablet Take 1 Tab by mouth daily (with dinner). Start from 9/30/2017    erenumab-aooe (AIMOVIG AUTOINJECTOR, 2 PACK,) 70 mg/mL atIn by SubCUTAneous route.  butalbital-acetaminophen-caffeine (FIORICET, ESGIC) -40 mg per tablet Take 1 Tab by mouth every six (6) hours as needed.  DULoxetine (CYMBALTA) 60 mg capsule Take 60 mg by mouth daily.  acetaminophen (TYLENOL EXTRA STRENGTH) 500 mg tablet Take 1,000 mg by mouth every six (6) hours as needed for Pain.  ondansetron hcl (ZOFRAN) 4 mg tablet Take 4 mg by mouth every eight (8) hours as needed for Nausea.  multivitamin, tx-iron-ca-min (THERA-M W/ IRON) 9 mg iron-400 mcg tab tablet Take 1 Tab by mouth daily.  topiramate (TOPAMAX) 100 mg tablet Take 100 mg by mouth two (2) times a day.  midodrine (PROAMITINE) 5 mg tablet Take 10 mg by mouth three (3) times daily. Indications: Symptomatic Orthostatic Hypotension    polyethylene glycol (MIRALAX) 17 gram/dose powder Take 17 g by mouth daily. 1 tablespoon with 8 oz of water daily    oxyCODONE-acetaminophen (PERCOCET)  mg per tablet Take  by mouth every four (4) hours as needed for Pain.  pregabalin (LYRICA) 150 mg capsule Take 150 mg by mouth two (2) times a day.  FLUoxetine (PROZAC) 20 mg capsule Take 20 mg by mouth daily.  alum-mag hydroxide-simeth (MYLANTA) 200-200-20 mg/5 mL susp Take 30 mL by mouth three (3) times daily (with meals). No current facility-administered medications for this visit.        No Known Allergies    Review of Symptoms:     General - No history or complaints of unexpected fever, chills, or weight loss  Head/Neck - No history or complaints of headache, diplopia, dysphagia, hearing loss  Cardiac - No history or complaints of chest pain, palpitations, murmur, or shortness of breath  Pulmonary - No history or complaints of shortness of breath, productive cough, hemoptysis  Gastrointestinal - As per the HPI above.   Genitourinary - No history or complaints of hematuria/dysuria, stress urinary incontinence symptoms, or renal lithiasis  Musculoskeletal - No history or complaints of joint pain or muscular weakness  Hematologic - No history or complaints of bleeding disorders, blood transfusions, sickle cell anemia  Neurologic - No history or complaints of  migraine headaches, seizure activity, syncopal episodes, TIA or stroke  Integumentary - No history or complaints of rashes, abnormal nevi, skin cancer  Gynecological - No history of heavy menses/abnormal menses        Physical Exam:     Physical Examination: General appearance - alert, well appearing, and in no distress and oriented to person, place, and time  Mental status - alert, oriented to person, place, and time, normal mood, behavior, speech, dress, motor activity, and thought processes  Neck - supple, no significant adenopathy  Chest - clear to auscultation, no wheezes, rales or rhonchi, symmetric air entry  Heart - normal rate, regular rhythm, normal S1, S2, no murmurs, rubs, clicks or gallops  Abdomen - soft, nontender, nondistended, no masses or organomegaly  Back exam - full range of motion, no tenderness, palpable spasm or pain on motion  Neurological - alert, oriented, normal speech, no focal findings or movement disorder noted  Extremities - peripheral pulses normal, no pedal edema, no clubbing or cyanosis         Laboratory / X-Rays:      Lab Results   Component Value Date/Time    WBC 8.0 02/26/2020 02:35 PM    HGB 12.1 02/26/2020 02:35 PM    HCT 38.9 02/26/2020 02:35 PM    PLATELET 838 32/35/0650 02:35 PM    MCV 94.4 02/26/2020 02:35 PM     Lab Results   Component Value Date/Time    Sodium 141 02/26/2020 02:35 PM    Potassium 4.0 02/26/2020 02:35 PM    Chloride 111 02/26/2020 02:35 PM    CO2 25 02/26/2020 02:35 PM    Anion gap 5 02/26/2020 02:35 PM    Glucose 45 (LL) 02/26/2020 02:35 PM    BUN 9 02/26/2020 02:35 PM    Creatinine 0.87 02/26/2020 02:35 PM    BUN/Creatinine ratio 10 (L) 02/26/2020 02:35 PM    GFR est AA >60 02/26/2020 02:35 PM    GFR est non-AA >60 02/26/2020 02:35 PM    Calcium 9.1 02/26/2020 02:35 PM    Bilirubin, total 0.2 02/26/2020 02:35 PM    AST (SGOT) 15 02/26/2020 02:35 PM    Alk. phosphatase 68 02/26/2020 02:35 PM    Protein, total 7.4 02/26/2020 02:35 PM    Albumin 4.0 02/26/2020 02:35 PM    Globulin 3.4 02/26/2020 02:35 PM    A-G Ratio 1.2 02/26/2020 02:35 PM    ALT (SGPT) 14 02/26/2020 02:35 PM     Lab Results   Component Value Date/Time    Iron 46 (L) 06/17/2019 04:14 PM    Ferritin 9 06/17/2019 04:14 PM     Lab Results   Component Value Date/Time    Folate 16.6 06/17/2019 04:14 PM     Lab Results   Component Value Date/Time    Vitamin D 25-Hydroxy 14.5 (L) 06/17/2019 04:15 PM           All labs and x-rays reviewed from their ER visit and are included below: Old operation scanned under media tab for initial gastric bypass    Assessment:     Clark Chris is a prior laparoscopic gastric bypass surgery   patient who underwent their weight loss surgical procedure procedure   approximately 11 years ago. She suffered a small bowel obstruction related to an internal hernia requiring small bowel resection and a questionable radiologic procedure in the past for mesenteric vein thrombosis. They now have abdominal pain that is persistent in nature. Recommendations: The diagnosis was discussed with the patient and evaluation and treatment plans outlined. See orders for lab and imaging studies. At this juncture once again we spent a significant amount of time trying to find her old operative reports from that small bowel obstruction and that admission so we can discern what all was performed.   She understands that she certainly might have adhesive disease which might be causing her symptoms or a recurrent internal hernia and since she is not had any contrasted CT scan of the abdomen or pelvis performed here locally we will obtain one.     Signed By: Sabrina Melendez MD     February 27, 2020

## 2020-03-09 ENCOUNTER — HOSPITAL ENCOUNTER (OUTPATIENT)
Dept: CT IMAGING | Age: 40
Discharge: HOME | End: 2020-03-09
Attending: SPECIALIST
Payer: OTHER GOVERNMENT

## 2020-03-09 DIAGNOSIS — R10.30 LOWER ABDOMINAL PAIN: ICD-10-CM

## 2020-03-09 PROCEDURE — 74011636320 HC RX REV CODE- 636/320: Performed by: SPECIALIST

## 2020-03-09 PROCEDURE — 74177 CT ABD & PELVIS W/CONTRAST: CPT

## 2020-03-09 PROCEDURE — 74011000255 HC RX REV CODE- 255: Performed by: SPECIALIST

## 2020-03-09 RX ORDER — BARIUM SULFATE 20 MG/ML
900 SUSPENSION ORAL
Status: COMPLETED | OUTPATIENT
Start: 2020-03-09 | End: 2020-03-09

## 2020-03-09 RX ADMIN — IOPAMIDOL 100 ML: 612 INJECTION, SOLUTION INTRAVENOUS at 14:05

## 2020-03-09 RX ADMIN — BARIUM SULFATE 900 ML: 20 SUSPENSION ORAL at 14:04

## 2021-11-22 NOTE — ED NOTES
Patient tearful. Complains of 10/10 abdominal pain. Patient medication per [facility]. Patient updated on CT delay. Given additional warm blanket and tv remote. Daughter remains at bedside. The patient is a 3y10m Female complaining of multiple medical complaints.

## 2022-01-20 NOTE — Clinical Note
Bilateral groin and left radial prepped with ChloraPrep and draped. Wet prep solution applied at: 900. Wet prep solution dried at: 905. Wet prep elapsed drying time: 5 mins. guidewire recovered/lumen(s) aspirated and flushed/sterile dressing applied/sterile technique, catheter placed/ultrasound guidance with use of sterile gel and probe cove

## 2022-06-29 ENCOUNTER — APPOINTMENT (OUTPATIENT)
Dept: CT IMAGING | Age: 42
End: 2022-06-29
Attending: EMERGENCY MEDICINE
Payer: OTHER GOVERNMENT

## 2022-06-29 ENCOUNTER — APPOINTMENT (OUTPATIENT)
Dept: GENERAL RADIOLOGY | Age: 42
End: 2022-06-29
Attending: EMERGENCY MEDICINE
Payer: OTHER GOVERNMENT

## 2022-06-29 ENCOUNTER — HOSPITAL ENCOUNTER (EMERGENCY)
Age: 42
Discharge: HOME | End: 2022-06-29
Attending: EMERGENCY MEDICINE
Payer: OTHER GOVERNMENT

## 2022-06-29 VITALS
HEIGHT: 66 IN | OXYGEN SATURATION: 100 % | WEIGHT: 149 LBS | SYSTOLIC BLOOD PRESSURE: 119 MMHG | BODY MASS INDEX: 23.95 KG/M2 | HEART RATE: 77 BPM | DIASTOLIC BLOOD PRESSURE: 79 MMHG | RESPIRATION RATE: 14 BRPM | TEMPERATURE: 98.5 F

## 2022-06-29 DIAGNOSIS — E16.2 HYPOGLYCEMIA: ICD-10-CM

## 2022-06-29 DIAGNOSIS — R55 SYNCOPE AND COLLAPSE: Primary | ICD-10-CM

## 2022-06-29 LAB
ALBUMIN SERPL-MCNC: 3.7 G/DL (ref 3.4–5)
ALBUMIN/GLOB SERPL: 1.1 {RATIO} (ref 0.8–1.7)
ALP SERPL-CCNC: 60 U/L (ref 45–117)
ALT SERPL-CCNC: 19 U/L (ref 13–56)
ANION GAP SERPL CALC-SCNC: 5 MMOL/L (ref 3–18)
AST SERPL-CCNC: 22 U/L (ref 10–38)
BASOPHILS # BLD: 0.1 K/UL (ref 0–0.1)
BASOPHILS NFR BLD: 1 % (ref 0–2)
BILIRUB SERPL-MCNC: 0.2 MG/DL (ref 0.2–1)
BUN SERPL-MCNC: 7 MG/DL (ref 7–18)
BUN/CREAT SERPL: 8 (ref 12–20)
CALCIUM SERPL-MCNC: 8.6 MG/DL (ref 8.5–10.1)
CHLORIDE SERPL-SCNC: 108 MMOL/L (ref 100–111)
CO2 SERPL-SCNC: 26 MMOL/L (ref 21–32)
CREAT SERPL-MCNC: 0.85 MG/DL (ref 0.6–1.3)
DIFFERENTIAL METHOD BLD: ABNORMAL
EOSINOPHIL # BLD: 0.2 K/UL (ref 0–0.4)
EOSINOPHIL NFR BLD: 2 % (ref 0–5)
ERYTHROCYTE [DISTWIDTH] IN BLOOD BY AUTOMATED COUNT: 13 % (ref 11.6–14.5)
GLOBULIN SER CALC-MCNC: 3.5 G/DL (ref 2–4)
GLUCOSE BLD STRIP.AUTO-MCNC: 169 MG/DL (ref 70–110)
GLUCOSE BLD STRIP.AUTO-MCNC: 49 MG/DL (ref 70–110)
GLUCOSE BLD STRIP.AUTO-MCNC: 68 MG/DL (ref 70–110)
GLUCOSE BLD STRIP.AUTO-MCNC: 79 MG/DL (ref 70–110)
GLUCOSE SERPL-MCNC: 82 MG/DL (ref 74–99)
HCG SERPL QL: NEGATIVE
HCT VFR BLD AUTO: 40.7 % (ref 35–45)
HGB BLD-MCNC: 13.1 G/DL (ref 12–16)
IMM GRANULOCYTES # BLD AUTO: 0 K/UL (ref 0–0.04)
IMM GRANULOCYTES NFR BLD AUTO: 0 % (ref 0–0.5)
LYMPHOCYTES # BLD: 2.3 K/UL (ref 0.9–3.6)
LYMPHOCYTES NFR BLD: 24 % (ref 21–52)
MCH RBC QN AUTO: 32.3 PG (ref 24–34)
MCHC RBC AUTO-ENTMCNC: 32.2 G/DL (ref 31–37)
MCV RBC AUTO: 100.2 FL (ref 78–100)
MONOCYTES # BLD: 0.8 K/UL (ref 0.05–1.2)
MONOCYTES NFR BLD: 8 % (ref 3–10)
NEUTS SEG # BLD: 6.2 K/UL (ref 1.8–8)
NEUTS SEG NFR BLD: 65 % (ref 40–73)
NRBC # BLD: 0 K/UL (ref 0–0.01)
NRBC BLD-RTO: 0 PER 100 WBC
PLATELET # BLD AUTO: 363 K/UL (ref 135–420)
PMV BLD AUTO: 9.4 FL (ref 9.2–11.8)
POTASSIUM SERPL-SCNC: 4.7 MMOL/L (ref 3.5–5.5)
PROT SERPL-MCNC: 7.2 G/DL (ref 6.4–8.2)
RBC # BLD AUTO: 4.06 M/UL (ref 4.2–5.3)
SODIUM SERPL-SCNC: 139 MMOL/L (ref 136–145)
TROPONIN-HIGH SENSITIVITY: 4 NG/L (ref 0–54)
TROPONIN-HIGH SENSITIVITY: 4 NG/L (ref 0–54)
WBC # BLD AUTO: 9.5 K/UL (ref 4.6–13.2)

## 2022-06-29 PROCEDURE — 96375 TX/PRO/DX INJ NEW DRUG ADDON: CPT

## 2022-06-29 PROCEDURE — 93005 ELECTROCARDIOGRAM TRACING: CPT

## 2022-06-29 PROCEDURE — 74011250636 HC RX REV CODE- 250/636: Performed by: EMERGENCY MEDICINE

## 2022-06-29 PROCEDURE — 96372 THER/PROPH/DIAG INJ SC/IM: CPT

## 2022-06-29 PROCEDURE — 82962 GLUCOSE BLOOD TEST: CPT

## 2022-06-29 PROCEDURE — 99285 EMERGENCY DEPT VISIT HI MDM: CPT

## 2022-06-29 PROCEDURE — 84703 CHORIONIC GONADOTROPIN ASSAY: CPT

## 2022-06-29 PROCEDURE — 84484 ASSAY OF TROPONIN QUANT: CPT

## 2022-06-29 PROCEDURE — 80053 COMPREHEN METABOLIC PANEL: CPT

## 2022-06-29 PROCEDURE — 85025 COMPLETE CBC W/AUTO DIFF WBC: CPT

## 2022-06-29 PROCEDURE — 71045 X-RAY EXAM CHEST 1 VIEW: CPT

## 2022-06-29 PROCEDURE — 70450 CT HEAD/BRAIN W/O DYE: CPT

## 2022-06-29 PROCEDURE — 74011250636 HC RX REV CODE- 250/636

## 2022-06-29 PROCEDURE — 96374 THER/PROPH/DIAG INJ IV PUSH: CPT

## 2022-06-29 PROCEDURE — 74011000250 HC RX REV CODE- 250: Performed by: EMERGENCY MEDICINE

## 2022-06-29 RX ORDER — ONDANSETRON 2 MG/ML
4 INJECTION INTRAMUSCULAR; INTRAVENOUS
Status: COMPLETED | OUTPATIENT
Start: 2022-06-29 | End: 2022-06-29

## 2022-06-29 RX ORDER — DEXTROSE MONOHYDRATE 100 MG/ML
0-250 INJECTION, SOLUTION INTRAVENOUS AS NEEDED
Status: DISCONTINUED | OUTPATIENT
Start: 2022-06-29 | End: 2022-06-29 | Stop reason: HOSPADM

## 2022-06-29 RX ADMIN — GLUCAGON HYDROCHLORIDE 1 MG: 1 INJECTION, POWDER, FOR SOLUTION INTRAMUSCULAR; INTRAVENOUS; SUBCUTANEOUS at 16:02

## 2022-06-29 RX ADMIN — DEXTROSE MONOHYDRATE 125 ML: 100 INJECTION, SOLUTION INTRAVENOUS at 16:22

## 2022-06-29 RX ADMIN — ONDANSETRON 4 MG: 2 INJECTION INTRAMUSCULAR; INTRAVENOUS at 18:51

## 2022-06-29 NOTE — ED PROVIDER NOTES
EMERGENCY DEPARTMENT HISTORY AND PHYSICAL EXAM    Date: 6/29/2022  Patient Name: Allan Funes    History of Presenting Illness     Chief Complaint   Patient presents with    Syncope         History Provided By: Patient and EMS    3:59 PM  Allan Funes is a 40 y.o. female with PMHX of anemia, fibromyalgia, hypoglycemia, hypotension, thrombosis on Xarelto who presents to the emergency department C/O syncopal episode. Patient reports that she has syncopal episode today. She reports she felt \"achy\" and sweaty right before the episode. She reports she is had similar episodes in the past.  EMS reports that she was hypoglycemic with glucoses in the 60s and they gave her oral glucose. On arrival here her glucose is in the 40s. She denies any fever, chest pain, shortness of breath, cough, vomiting, bowel or urinary complaints. No other relieving or exacerbating factors identified. She reports she had her head on her keyboard at work when she had a syncopal episode. PCP: Usman, MD Amita    Current Facility-Administered Medications   Medication Dose Route Frequency Provider Last Rate Last Admin    dextrose 10% infusion 0-250 mL  0-250 mL IntraVENous PRN Natalia Currie MD   IV Completed at 06/29/22 1632     Current Outpatient Medications   Medication Sig Dispense Refill    gabapentin (NEURONTIN) 300 mg capsule Take 300 mg by mouth three (3) times daily.  polyethylene glycol (MIRALAX) 17 gram/dose powder Take 17 g by mouth daily. 1 tablespoon with 8 oz of water daily 289 g 0    dicyclomine (BENTYL) 10 mg capsule Take 2 Caps by mouth three (3) times daily. 20 Cap 0    rivaroxaban (XARELTO) 20 mg tab tablet Take 1 Tab by mouth daily (with dinner). Start from 9/30/2017 1 Tab 0    erenumab-aooe (AIMOVIG AUTOINJECTOR, 2 PACK,) 70 mg/mL atIn by SubCUTAneous route.  butalbital-acetaminophen-caffeine (FIORICET, ESGIC) -40 mg per tablet Take 1 Tab by mouth every six (6) hours as needed.       DULoxetine (CYMBALTA) 60 mg capsule Take 60 mg by mouth daily.  oxyCODONE-acetaminophen (PERCOCET)  mg per tablet Take  by mouth every four (4) hours as needed for Pain.  acetaminophen (TYLENOL EXTRA STRENGTH) 500 mg tablet Take 1,000 mg by mouth every six (6) hours as needed for Pain.  pregabalin (LYRICA) 150 mg capsule Take 150 mg by mouth two (2) times a day.  FLUoxetine (PROZAC) 20 mg capsule Take 20 mg by mouth daily.  ondansetron hcl (ZOFRAN) 4 mg tablet Take 4 mg by mouth every eight (8) hours as needed for Nausea.  alum-mag hydroxide-simeth (MYLANTA) 200-200-20 mg/5 mL susp Take 30 mL by mouth three (3) times daily (with meals). 354 mL 0    multivitamin, tx-iron-ca-min (THERA-M W/ IRON) 9 mg iron-400 mcg tab tablet Take 1 Tab by mouth daily.  topiramate (TOPAMAX) 100 mg tablet Take 100 mg by mouth two (2) times a day.  midodrine (PROAMITINE) 5 mg tablet Take 10 mg by mouth three (3) times daily.  Indications: Symptomatic Orthostatic Hypotension         Past History       Past Medical History:  Past Medical History:   Diagnosis Date    Anemia     iron infusions-every 6 months    Anticoagulated     Anxiety     Arthritis     osteo-knee    Chronic pain     abdominal pain    Coagulation disorder (HCC)     Depression     Fibromyalgia     History of coagulopathy     factor 5 liden     Intestinal malabsorption     Mesenteric vein thrombosis (HCC)     spontaneous / anticoagulated for life    Migraine     PUD (peptic ulcer disease) 2007    H/O    [place] for [place] [facility]    Syncope     Thromboembolus [facility])     DVT x 2 possibly due to numerous surgeries mesenteric vein       Past Surgical History:  Past Surgical History:   Procedure Laterality Date    EGD      HX BREAST BIOPSY Left     HX BREAST REDUCTION      HX HERNIA REPAIR      abdominal wall X 3 with mesh    HX HYSTERECTOMY      HX KNEE ARTHROSCOPY Left 2013  HX LAP CHOLECYSTECTOMY      HX LAP GASTRIC BYPASS  12/2008    [place] [facility]    HX OOPHORECTOMY Right     as well as tube with D&C    HX OTHER SURGICAL  2001    pilonidal cyst    HX OTHER SURGICAL      suspected TIPs procedure    HX OTHER SURGICAL      correction of suspected internal hernia / SBO post gastric bypass    VASCULAR SURGERY PROCEDURE UNLIST      intervential radiologist fixed blood cot in the abdomen       Family History:  Family History   Problem Relation Age of Onset    Heart Disease Mother     Diabetes Father     Cancer Father        Social History:  Social History     Tobacco Use    Smoking status: Never Smoker    Smokeless tobacco: Never Used   Substance Use Topics    Alcohol use: Yes     Alcohol/week: 1.0 standard drink     Types: 1 Glasses of wine per week     Comment: 4-6 times a year    Drug use: No       Allergies:  No Known Allergies      Review of Systems   Review of Systems   Constitutional: Negative for fever. Respiratory: Negative for shortness of breath. Cardiovascular: Negative for chest pain. Gastrointestinal: Negative for abdominal pain. Neurological: Positive for syncope. All other systems reviewed and are negative.         Physical Exam     Vitals:    06/29/22 1720 06/29/22 1735 06/29/22 1800 06/29/22 1915   BP: 113/83 111/80 119/79    Pulse: 74 76 87 77   Resp: 15 13 17 14   Temp:       SpO2: 100% 100% 100% 100%   Weight:       Height:         Physical Exam    Nursing notes and vital signs reviewed    Constitutional: Non toxic appearing, moderate distress  Head: Normocephalic, Atraumatic  Eyes: EOMI, PERRL  Neck: Supple no spinal tenderness palpation  Cardiovascular: Regular rate and rhythm, no murmurs, rubs, or gallops  Chest: Normal work of breathing and chest excursion bilaterally  Lungs: Clear to ausculation bilaterally  Abdomen: Soft, non tender, non distended  Back: No evidence of trauma or deformity  Extremities: No evidence of trauma or deformity, no LE edema  Skin: Warm and dry, normal cap refill  Neuro: Alert and appropriate, CN intact, normal speech, strength and sensation symmetric bilaterally, normal coordination  Psychiatric: Normal mood and affect      Diagnostic Study Results     Labs -     Recent Results (from the past 12 hour(s))   GLUCOSE, POC    Collection Time: 06/29/22  3:51 PM   Result Value Ref Range    Glucose (POC) 49 (LL) 70 - 110 mg/dL   EKG, 12 LEAD, INITIAL    Collection Time: 06/29/22  3:51 PM   Result Value Ref Range    Ventricular Rate 66 BPM    Atrial Rate 66 BPM    P-R Interval 124 ms    QRS Duration 74 ms    Q-T Interval 414 ms    QTC Calculation (Bezet) 434 ms    Calculated P Axis 32 degrees    Calculated R Axis 49 degrees    Calculated T Axis 33 degrees    Diagnosis       Normal sinus rhythm  Normal ECG  When compared with ECG of 22-OCT-2018 16:08,  Nonspecific T wave abnormality no longer evident in Anterior leads     CBC WITH AUTOMATED DIFF    Collection Time: 06/29/22  3:55 PM   Result Value Ref Range    WBC 9.5 4.6 - 13.2 K/uL    RBC 4.06 (L) 4.20 - 5.30 M/uL    HGB 13.1 12.0 - 16.0 g/dL    HCT 40.7 35.0 - 45.0 %    .2 (H) 78.0 - 100.0 FL    MCH 32.3 24.0 - 34.0 PG    MCHC 32.2 31.0 - 37.0 g/dL    RDW 13.0 11.6 - 14.5 %    PLATELET 677 164 - 225 K/uL    MPV 9.4 9.2 - 11.8 FL    NRBC 0.0 0  WBC    ABSOLUTE NRBC 0.00 0.00 - 0.01 K/uL    NEUTROPHILS 65 40 - 73 %    LYMPHOCYTES 24 21 - 52 %    MONOCYTES 8 3 - 10 %    EOSINOPHILS 2 0 - 5 %    BASOPHILS 1 0 - 2 %    IMMATURE GRANULOCYTES 0 0.0 - 0.5 %    ABS. NEUTROPHILS 6.2 1.8 - 8.0 K/UL    ABS. LYMPHOCYTES 2.3 0.9 - 3.6 K/UL    ABS. MONOCYTES 0.8 0.05 - 1.2 K/UL    ABS. EOSINOPHILS 0.2 0.0 - 0.4 K/UL    ABS. BASOPHILS 0.1 0.0 - 0.1 K/UL    ABS. IMM.  GRANS. 0.0 0.00 - 0.04 K/UL    DF AUTOMATED     METABOLIC PANEL, COMPREHENSIVE    Collection Time: 06/29/22  3:55 PM   Result Value Ref Range    Sodium 139 136 - 145 mmol/L    Potassium 4.7 3.5 - 5.5 mmol/L Chloride 108 100 - 111 mmol/L    CO2 26 21 - 32 mmol/L    Anion gap 5 3.0 - 18 mmol/L    Glucose 82 74 - 99 mg/dL    BUN 7 7.0 - 18 MG/DL    Creatinine 0.85 0.6 - 1.3 MG/DL    BUN/Creatinine ratio 8 (L) 12 - 20      GFR est AA >60 >60 ml/min/1.73m2    GFR est non-AA >60 >60 ml/min/1.73m2    Calcium 8.6 8.5 - 10.1 MG/DL    Bilirubin, total 0.2 0.2 - 1.0 MG/DL    ALT (SGPT) 19 13 - 56 U/L    AST (SGOT) 22 10 - 38 U/L    Alk. phosphatase 60 45 - 117 U/L    Protein, total 7.2 6.4 - 8.2 g/dL    Albumin 3.7 3.4 - 5.0 g/dL    Globulin 3.5 2.0 - 4.0 g/dL    A-G Ratio 1.1 0.8 - 1.7     HCG QL SERUM    Collection Time: 06/29/22  3:55 PM   Result Value Ref Range    HCG, Ql. Negative NEG     TROPONIN-HIGH SENSITIVITY    Collection Time: 06/29/22  3:55 PM   Result Value Ref Range    Troponin-High Sensitivity 4 0 - 54 ng/L   GLUCOSE, POC    Collection Time: 06/29/22  4:17 PM   Result Value Ref Range    Glucose (POC) 68 (L) 70 - 110 mg/dL   GLUCOSE, POC    Collection Time: 06/29/22  4:56 PM   Result Value Ref Range    Glucose (POC) 169 (H) 70 - 110 mg/dL   TROPONIN-HIGH SENSITIVITY    Collection Time: 06/29/22  6:30 PM   Result Value Ref Range    Troponin-High Sensitivity 4 0 - 54 ng/L   EKG, 12 LEAD, SUBSEQUENT    Collection Time: 06/29/22  6:51 PM   Result Value Ref Range    Ventricular Rate 65 BPM    Atrial Rate 65 BPM    P-R Interval 128 ms    QRS Duration 84 ms    Q-T Interval 420 ms    QTC Calculation (Bezet) 436 ms    Calculated P Axis 40 degrees    Calculated R Axis 49 degrees    Calculated T Axis 28 degrees    Diagnosis       Normal sinus rhythm  Normal ECG  When compared with ECG of 29-JUN-2022 15:51,  No significant change was found     GLUCOSE, POC    Collection Time: 06/29/22  7:51 PM   Result Value Ref Range    Glucose (POC) 79 70 - 110 mg/dL       Radiologic Studies -   CT HEAD WO CONT   Final Result      1. No acute intracranial pathology.       XR CHEST PORT    (Results Pending)     CT Results  (Last 48 hours) 06/29/22 1845  CT HEAD WO CONT Final result    Impression:      1. No acute intracranial pathology. Narrative:  EXAMINATION:  CT head noncontrast       COMPARISON: None       HISTORY: Head injury       TECHNIQUE: Noncontrasted axial images were obtained through the patient's brain   from the vertex of the skull through the skull base. Bone and soft tissue   windows were reviewed. One or more dose reduction techniques were used on this   CT: automated exposure control, adjustment of the mAs and/or kVp according to   patient size, and iterative reconstruction techniques. The specific techniques   used on this CT exam have been documented in the patient's electronic medical   record. Digital Imaging and Communications in Medicine (DICOM) format image   data are available to nonaffiliated external healthcare facilities or entities   on a secure, media free, reciprocally searchable basis with patient   authorization for at least a 12-month period after this study. FINDINGS: Brain architecture is normal. No mass effect, midline shift or   hemorrhage. Ventricles are normal in size, position and configuration. No   abnormal extra-axial fluid collections are seen. No territorial signs of   infarct. The bony calvarium appears intact without acute displaced fracture. The   visualized paranasal sinuses and mastoid air cells are aerated. CXR Results  (Last 48 hours)    None          Medications given in the ED-  Medications   dextrose 10% infusion 0-250 mL (0 mL IntraVENous IV Completed 6/29/22 1632)   glucagon (GLUCAGEN) 1 mg injection (1 mg IntraMUSCular Given 6/29/22 1602)   ondansetron (ZOFRAN) injection 4 mg (4 mg IntraVENous Given 6/29/22 1851)         Medical Decision Making   I am the first provider for this patient. I reviewed the vital signs, available nursing notes, past medical history, past surgical history, family history and social history.     Vital Signs-Reviewed the patient's vital signs. Pulse Oximetry Analysis - 99% on room air, not hypoxic     Cardiac Monitor:  Rate: 63 bpm  Rhythm: Normal sinus    EKG interpretation: (Preliminary)  EKG read by Dr. Keke Lozano at 3:59 PM  Normal sinus rhythm at a rate of 66 bpm, IA interval 124 ms, QRS duration of 74 ms, similar when compared to prior from October 2018    Repeat EKG interpretation: (Preliminary)  EKG read by Dr. Keke Lozano at 50 8 PM 6:58 PM  Normal sinus rhythm at rate of 65 bpm, IA interval 120 ms, QRS duration of 84 ms    Records Reviewed: Nursing Notes, Old Medical Records and Previous electrocardiograms    Provider Notes (Medical Decision Making): Red Lopez is a 40 y.o. female presenting after a syncopal episode. Patient is hemodynamically stable without focal neurodeficits. Patient was hypoglycemic with EMS and here requiring IV glucose. Other than hypoglycemia labs are benign including high-sensitivity troponin and EKG x2. Patient's glucose has been stable here for period of observation. She is not on into any antiglycemic drugs. Would benefit from endocrinology consultation as this is happened before in the past and I have provided a referral.  Plan for discharge with primary care and endocrinology follow-up with return precautions. Patient understands and agrees with this plan. Procedures:  Procedures    ED Course:   7:50 PM  Updated patient on all results and plan. All questions answered. Diagnosis and Disposition     Critical Care: 8:24 PM  I have spent 32 minutes of critical care time involved in lab review, consultations with specialist, family decision-making, and documentation. During this entire length of time I was immediately available to the patient. Critical Care:   The reason for providing this level of medical care for this critically ill patient was due a critical illness that impaired one or more vital organ systems such that there was a high probability of imminent or life threatening deterioration in the patients condition. This care involved high complexity decision making to assess, manipulate, and support vital system functions, to treat this degreee vital organ system failure and to prevent further life threatening deterioration of the patients condition. DISCHARGE NOTE:    David Coppola's  results have been reviewed with her. She has been counseled regarding her diagnosis, treatment, and plan. She verbally conveys understanding and agreement of the signs, symptoms, diagnosis, treatment and prognosis and additionally agrees to follow up as discussed. She also agrees with the care-plan and conveys that all of her questions have been answered. I have also provided discharge instructions for her that include: educational information regarding their diagnosis and treatment, and list of reasons why they would want to return to the ED prior to their follow-up appointment, should her condition change. She has been provided with education for proper emergency department utilization. CLINICAL IMPRESSION:    1. Syncope and collapse    2. Hypoglycemia        PLAN:  1. D/C Home  2. Discharge Medication List as of 6/29/2022  7:55 PM        3. Follow-up Information     Follow up With Specialties Details Why Contact Info    Richard Momin,  Internal Medicine Physician Schedule an appointment as soon as possible for a visit  Or Your Primary Care Doctor Sarwat Dupont [address] [place] [id number]  [address] [place] [address]  Schedule an appointment as soon as possible for a visit   [address]  [address] [facility] [address] [phone]    [facility] EMERGENCY DEPT Emergency Medicine  If symptoms worsen [address] [place] [id number]  [phone]        _______________________________      Please note that this dictation was completed with [employer], the [employer] voice recognition software.   Quite often unanticipated grammatical, syntax, homophones, and other interpretive errors are inadvertently transcribed by the computer software. Please disregard these errors. Please excuse any errors that have escaped final proofreading.

## 2022-06-29 NOTE — ED NOTES
Blood sugar now 168, after D 10 infusion. Pt states she is feeling better able to stand and move all extremities.

## 2022-07-04 LAB
ATRIAL RATE: 65 BPM
ATRIAL RATE: 66 BPM
CALCULATED P AXIS, ECG09: 32 DEGREES
CALCULATED P AXIS, ECG09: 40 DEGREES
CALCULATED R AXIS, ECG10: 49 DEGREES
CALCULATED R AXIS, ECG10: 49 DEGREES
CALCULATED T AXIS, ECG11: 28 DEGREES
CALCULATED T AXIS, ECG11: 33 DEGREES
DIAGNOSIS, 93000: NORMAL
DIAGNOSIS, 93000: NORMAL
P-R INTERVAL, ECG05: 124 MS
P-R INTERVAL, ECG05: 128 MS
Q-T INTERVAL, ECG07: 414 MS
Q-T INTERVAL, ECG07: 420 MS
QRS DURATION, ECG06: 74 MS
QRS DURATION, ECG06: 84 MS
QTC CALCULATION (BEZET), ECG08: 434 MS
QTC CALCULATION (BEZET), ECG08: 436 MS
VENTRICULAR RATE, ECG03: 65 BPM
VENTRICULAR RATE, ECG03: 66 BPM

## (undated) DEVICE — TROCAR LAP L100MM DIA5MM BLDELSS W/ STBL SL ENDOPATH XCEL

## (undated) DEVICE — SHIELD RAD 14X16 IN W/ SCOOP ABSORBER

## (undated) DEVICE — TRAP SPEC COLL POLYP POLYSTYR --

## (undated) DEVICE — TRAY CATH 16FR DRN BG LF -- CONVERT TO ITEM 363158

## (undated) DEVICE — PROCEDURE KIT FLUID MGMT 10 FR CUST MAINFOLD

## (undated) DEVICE — Device

## (undated) DEVICE — TROCAR ENDOSCP L100MM DIA12MM STBL SL BLDELSS ENDOPATH XCEL

## (undated) DEVICE — STERILE POLYISOPRENE POWDER-FREE SURGICAL GLOVES: Brand: PROTEXIS

## (undated) DEVICE — GUIDEWIRE VASC L260CM DIA0.035IN RAD 3MM J TIP L7CM PTFE

## (undated) DEVICE — SUTURE NRLN 0 L18IN NONABSORBABLE BLK MO-6 L26MM 1/2 CIR C545D

## (undated) DEVICE — STRAP,POSITIONING,KNEE/BODY,FOAM,4X60": Brand: MEDLINE

## (undated) DEVICE — BAND COMPR L21CM SHT CLR PLAS HEMSTAT EXT HK AND LOOP RETEN

## (undated) DEVICE — SUT VCRL + 0 36IN UR6 VIO --

## (undated) DEVICE — GLIDESHEATH SLENDER STAINLESS STEEL KIT: Brand: GLIDESHEATH SLENDER

## (undated) DEVICE — REM POLYHESIVE ADULT PATIENT RETURN ELECTRODE: Brand: VALLEYLAB

## (undated) DEVICE — DEVICE SUT W/ V-LOC SUT SZ 2-0 L8IN ABSRB LD UNIT SUT BARB

## (undated) DEVICE — DISPOSABLE SUCTION/IRRIGATOR TUBE SET WITH TIP: Brand: AHTO

## (undated) DEVICE — ANGIOGRAPHIC CATHETER: Brand: EXPO™

## (undated) DEVICE — PRESSURE MONITORING SET: Brand: TRUWAVE

## (undated) DEVICE — GOWN ISOLATN REG BLU POLY UNISX W/ THMB LOOP

## (undated) DEVICE — TUBING PRSS MON L24IN PVC RIG NONEXPANDING M TO FEM CONN

## (undated) DEVICE — SPONGE GZ W4XL4IN COT 12 PLY TYP VII WVN C FLD DSGN

## (undated) DEVICE — VCARE SMALL, UTERINE MANIPULATOR, VAGINAL-CERVICAL-AHLUWALIA'S-RETRACTOR-ELEVATOR: Brand: VCARE

## (undated) DEVICE — MAJ-1414 SINGLE USE ADPATER BIOPSY VALV: Brand: SINGLE USE ADAPTOR BIOPSY VALVE

## (undated) DEVICE — MEDI-VAC NON-CONDUCTIVE SUCTION TUBING: Brand: CARDINAL HEALTH

## (undated) DEVICE — SOLUTION IRRIGATION H2O 0797305] ICU MEDICAL INC]

## (undated) DEVICE — PACK PROCEDURE SURG CATH CUST

## (undated) DEVICE — SUT MONOCRYL PLUS UD 3-0 --

## (undated) DEVICE — GYN LAPAROSCOPY: Brand: MEDLINE INDUSTRIES, INC.

## (undated) DEVICE — KENDALL RADIOLUCENT FOAM MONITORING ELECTRODE RECTANGULAR SHAPE: Brand: KENDALL

## (undated) DEVICE — 4-PORT MANIFOLD: Brand: NEPTUNE 2

## (undated) DEVICE — SHEAR HARMONIC ACET 5MMX36CM -- ACE PLUS

## (undated) DEVICE — STERILE COTTON TIPPED APPLICATORS: Brand: PURITAN

## (undated) DEVICE — GARMENT,MEDLINE,DVT,INT,CALF,MED, GEN2: Brand: MEDLINE

## (undated) DEVICE — ENDO CARRY-ON PROCEDURE KIT INCLUDES ENZYMATIC SPONGE, GAUZE, BIOHAZARD LABEL, TRAY, LUBRICANT, DIRTY SCOPE LABEL, WATER LABEL, TRAY, DRAWSTRING PAD, AND DEFENDO 4-PIECE KIT.: Brand: ENDO CARRY-ON PROCEDURE KIT

## (undated) DEVICE — TRAY PREP DRY W/ PREM GLV 2 APPL 6 SPNG 2 UNDPD 1 OVERWRAP

## (undated) DEVICE — SOLUTION LACTATED RINGERS INJECTION USP

## (undated) DEVICE — DRAPE,ANGIO,BRACH,STERILE,38X44: Brand: MEDLINE

## (undated) DEVICE — KENDALL SCD EXPRESS SLEEVES, KNEE LENGTH, MEDIUM: Brand: KENDALL SCD

## (undated) DEVICE — TRI-LUMEN FILTERED TUBE SET WITH ACTIVATED CHARCOAL FILTER: Brand: AIRSEAL

## (undated) DEVICE — NEEDLE HYPO 22GA L1.5IN BLK S STL HUB POLYPR SHLD REG BVL

## (undated) DEVICE — SUTURING DEVICE: Brand: ENDO STITCH

## (undated) DEVICE — DERMABOND SKIN ADH 0.7ML -- DERMABOND ADVANCED 12/BX

## (undated) DEVICE — FORCEPS BPLR DIA5MM MACRO JAW LAP ENDOPATH

## (undated) DEVICE — SINGLE PORT MANIFOLD: Brand: NEPTUNE 2

## (undated) DEVICE — SENSOR PLSE OXMTR AD CBL L36IN ADH FRM FIT SPO2 DISP

## (undated) DEVICE — MOUTHPIECE ENDOSCP 20X27MM --

## (undated) DEVICE — AIRSEAL 5 MM ACCESS PORT AND LOW PROFILE OBTURATOR WITH BLADELESS OPTICAL TIP, 100 MM LENGTH: Brand: AIRSEAL

## (undated) DEVICE — 40580 - THE PINK PAD - ADVANCED TRENDELENBURG POSITIONING KIT: Brand: 40580 - THE PINK PAD - ADVANCED TRENDELENBURG POSITIONING KIT

## (undated) DEVICE — STOPCOCK TRNSDUC 500PSI 3 W ROT M LUER LT BLU OFF HNDL R